# Patient Record
Sex: FEMALE | Race: WHITE | NOT HISPANIC OR LATINO | Employment: FULL TIME
[De-identification: names, ages, dates, MRNs, and addresses within clinical notes are randomized per-mention and may not be internally consistent; named-entity substitution may affect disease eponyms.]

---

## 2017-05-26 ENCOUNTER — HEALTH MAINTENANCE LETTER (OUTPATIENT)
Age: 42
End: 2017-05-26

## 2017-12-28 ENCOUNTER — OFFICE VISIT (OUTPATIENT)
Dept: FAMILY MEDICINE | Facility: CLINIC | Age: 42
End: 2017-12-28
Payer: COMMERCIAL

## 2017-12-28 VITALS
SYSTOLIC BLOOD PRESSURE: 130 MMHG | BODY MASS INDEX: 40.86 KG/M2 | HEIGHT: 68 IN | DIASTOLIC BLOOD PRESSURE: 84 MMHG | OXYGEN SATURATION: 96 % | RESPIRATION RATE: 16 BRPM | WEIGHT: 269.6 LBS | HEART RATE: 74 BPM | TEMPERATURE: 94.7 F

## 2017-12-28 DIAGNOSIS — I10 BENIGN ESSENTIAL HYPERTENSION: Primary | ICD-10-CM

## 2017-12-28 DIAGNOSIS — I87.2 VENOUS STASIS DERMATITIS OF LEFT LOWER EXTREMITY: ICD-10-CM

## 2017-12-28 RX ORDER — LOSARTAN POTASSIUM 25 MG/1
25 TABLET ORAL DAILY
Qty: 90 TABLET | Refills: 3 | Status: SHIPPED | OUTPATIENT
Start: 2017-12-28 | End: 2019-01-22

## 2017-12-28 RX ORDER — LOSARTAN POTASSIUM 25 MG/1
25 TABLET ORAL DAILY
COMMUNITY
End: 2017-12-28

## 2017-12-28 ASSESSMENT — ANXIETY QUESTIONNAIRES
IF YOU CHECKED OFF ANY PROBLEMS ON THIS QUESTIONNAIRE, HOW DIFFICULT HAVE THESE PROBLEMS MADE IT FOR YOU TO DO YOUR WORK, TAKE CARE OF THINGS AT HOME, OR GET ALONG WITH OTHER PEOPLE: NOT DIFFICULT AT ALL
7. FEELING AFRAID AS IF SOMETHING AWFUL MIGHT HAPPEN: NOT AT ALL
5. BEING SO RESTLESS THAT IT IS HARD TO SIT STILL: NOT AT ALL
GAD7 TOTAL SCORE: 0
2. NOT BEING ABLE TO STOP OR CONTROL WORRYING: NOT AT ALL
3. WORRYING TOO MUCH ABOUT DIFFERENT THINGS: NOT AT ALL
6. BECOMING EASILY ANNOYED OR IRRITABLE: NOT AT ALL
1. FEELING NERVOUS, ANXIOUS, OR ON EDGE: NOT AT ALL

## 2017-12-28 ASSESSMENT — PATIENT HEALTH QUESTIONNAIRE - PHQ9
5. POOR APPETITE OR OVEREATING: NOT AT ALL
SUM OF ALL RESPONSES TO PHQ QUESTIONS 1-9: 0

## 2017-12-28 ASSESSMENT — PAIN SCALES - GENERAL: PAINLEVEL: NO PAIN (0)

## 2017-12-28 NOTE — NURSING NOTE
"42 year old  Chief Complaint   Patient presents with     Refill Request     Losartan, 25 mg.        Blood pressure 130/84, pulse 74, temperature 94.7  F (34.8  C), temperature source Oral, resp. rate 16, height 5' 7.7\" (172 cm), weight 269 lb 9.6 oz (122.3 kg), last menstrual period 12/28/2017, SpO2 96 %, not currently breastfeeding. Body mass index is 41.36 kg/(m^2).  BP completed using cuff size: regular    There is no problem list on file for this patient.      Wt Readings from Last 2 Encounters:   12/28/17 269 lb 9.6 oz (122.3 kg)   02/05/01 221 lb (100.2 kg)     BP Readings from Last 3 Encounters:   12/28/17 130/84   06/27/02 120/74   02/05/01 110/74       Current Outpatient Prescriptions   Medication     losartan (COZAAR) 25 MG tablet     CETIRIZINE HCL PO     No current facility-administered medications for this visit.        Social History   Substance Use Topics     Smoking status: Never Smoker     Smokeless tobacco: Never Used     Alcohol use No       Health Maintenance Due   Topic Date Due     TETANUS IMMUNIZATION (SYSTEM ASSIGNED)  06/01/1991     PAP SCREENING Q3 YR (SYSTEM ASSIGNED)  06/01/1994     INFLUENZA VACCINE (SYSTEM ASSIGNED)  09/01/2017       No results found for: PAP    PHQ-2 ( 1999 Pfizer) 12/28/2017   Q1: Little interest or pleasure in doing things 0   Q2: Feeling down, depressed or hopeless 0   PHQ-2 Score 0       PHQ-9 SCORE 12/28/2017   Total Score 0       DANIELLE-7 SCORE 12/28/2017   Total Score 0       No flowsheet data found.    Tiki Bergman MA  December 28, 2017 8:14 AM  "

## 2017-12-28 NOTE — MR AVS SNAPSHOT
After Visit Summary   12/28/2017    Breanna Fischer    MRN: [id number]           Patient Information     Date Of Birth          1975        Visit Information        Provider Department      12/28/2017 8:00 AM Silvana Orellana NP [facility]        Today's Diagnoses     Benign essential hypertension    -  1    Venous stasis dermatitis of left lower extremity           Follow-ups after your visit        Additional Services     DERMATOLOGY REFERRAL       Your provider has referred you to: [facility]: Dermatology Clinic [facility] [phone]   [email or web address]    Please be aware that coverage of these services is subject to the terms and limitations of your health insurance plan.  Call member services at your health plan with any benefit or coverage questions.      Please bring the following with you to your appointment:    (1) Any X-Rays, CTs or MRIs which have been performed.  Contact the facility where they were done to arrange for  prior to your scheduled appointment.    (2) List of current medications  (3) This referral request   (4) Any documents/labs given to you for this referral                  Who to contact     Please call your clinic at [phone] to:    Ask questions about your health    Make or cancel appointments    Discuss your medicines    Learn about your test results    Speak to your doctor   If you have compliments or concerns about an experience at your clinic, or if you wish to file a complaint, please contact [facility] Patient Relations at [phone] or email us at [email or web address]         Additional Information About Your Visit        MyChart Information     TouchMail is an electronic gateway that provides easy, online access to your medical records. With TouchMail, you can request a clinic appointment, read your test results, renew a prescription or communicate with your care  "team.     To sign up for Pointt visit the website at [email or web address]   You will be asked to enter the access code listed below, as well as some personal information. Please follow the directions to create your username and password.     Your access code is: [email or web address]  Expires: 3/28/2018  8:50 AM     Your access code will  in 90 days. If you need help or a new code, please contact your [facility] or call [phone] for assistance.        Care EveryWhere ID     This is your Care EveryWhere ID. This could be used by other organizations to access your [place] medical records  [id number]        Your Vitals Were     Pulse Temperature Respirations Height Last Period Pulse Oximetry    74 94.7  F (34.8  C) (Oral) 16 5' 7.7\" (172 cm) 2017 (Exact Date) 96%    Breastfeeding? BMI (Body Mass Index)                No 41.36 kg/m2           Blood Pressure from Last 3 Encounters:   17 130/84   02 120/74   01 110/74    Weight from Last 3 Encounters:   17 269 lb 9.6 oz (122.3 kg)   01 221 lb (100.2 kg)              We Performed the Following     DERMATOLOGY REFERRAL          Where to get your medicines      These medications were sent to [facility] [address] [place] [facility] [address] [place] [address]  [address] [facility] [address]     Phone:  [phone]     losartan 25 MG tablet          Primary Care Provider Office Phone # Fax #    Lindsey Gricelda Sanchez, [facility] [phone] [address] [facility] [address]        Equal Access to Services     RITA GROVE AH: Hadii priscilla Candelario, waflorentinda luqadaha, qaybta kaalmada rachael, ester cabrera. So [facility] [phone].    ATENCIÓN: Si habla español, tiene a esposito disposición servicios gratuitos de asistencia lingüística. Llame al [phone].    We comply with applicable federal civil rights laws and [place] laws. We do not discriminate on the " basis of race, color, national origin, age, disability, sex, sexual orientation, or gender identity.            Thank you!     Thank you for choosing [facility]  for your care. Our goal is always to provide you with excellent care. Hearing back from our patients is one way we can continue to improve our services. Please take a few minutes to complete the written survey that you may receive in the mail after your visit with us. Thank you!             Your Updated Medication List - Protect others around you: Learn how to safely use, store and throw away your medicines at www.disposemymeds.org.          This list is accurate as of: 12/28/17  8:50 AM.  Always use your most recent med list.                   Brand Name Dispense Instructions for use Diagnosis    CETIRIZINE HCL PO      Take by mouth daily        losartan 25 MG tablet    COZAAR    90 tablet    Take 1 tablet (25 mg) by mouth daily    Benign essential hypertension

## 2017-12-28 NOTE — PROGRESS NOTES
Breanna Fischer is a 42 year old female who presents today to establish care and for a refill on her antihypertensive medication.  She has had HTN for several years, she relates it to situation and weight gain.  She has been well-controlled on her med, she has no symptoms of chest pain, SOB, fluid retention.  Denies problems with endurance.    Breanna has celiac disease, she manages with diet and does very well with that.  She has lost weight (50#) and has regained 44# of that and is concerned about that.  She needs to deal with this on her own, she feels confident that she knows what to do.    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  She has recently moved back to her home area from [place], she came because her family is here.  One sister has had her 3rd MI, she is in her late 30's.  They believe it is from radiation from lymphoma as a child.  She is  and has 2 older children at home with her.    Breanna has stasis dermatitis of her left lower leg, she has seen several dermatologists without much help.  She knows she may be reacting or allergic to something, she thinks it could be her cat.  She has used multiple products on it and has found that neosporin ungt OTC seems to help the most.    Review Of Systems  Skin: as above   ROS: 10 point ROS neg other than the symptoms noted above in the HPI.    Past Medical History:   Diagnosis Date     Celiac disease      Hypertension      Past Surgical History:   Procedure Laterality Date     CHOLECYSTECTOMY       Social History     Social History     Marital status:      Spouse name: N/A     Number of children: N/A     Years of education: N/A     Occupational History     Not on file.     Social History Main Topics     Smoking status: Never Smoker     Smokeless tobacco: Never Used     Alcohol use No     Drug use: No     Sexual activity: No     Other Topics Concern     Not on file     Social History Narrative     No narrative on file     Family History   Problem  "Relation Age of Onset     Obesity Mother      Thyroid Disease Mother      Thyroid Disease Father      Breast Cancer Paternal Grandmother      Obesity Sister      Thyroid Disease Sister        /84 (BP Location: Right arm, Patient Position: Chair, Cuff Size: Adult Large)  Pulse 74  Temp 94.7  F (34.8  C) (Oral)  Resp 16  Ht 5' 7.7\" (172 cm)  Wt 269 lb 9.6 oz (122.3 kg)  LMP 12/28/2017 (Exact Date)  SpO2 96%  Breastfeeding? No  BMI 41.36 kg/m2    Exam:  Constitutional: healthy, alert and no distress  Head: Normocephalic. No masses, lesions, tenderness or abnormalities  Neck: Neck supple. No adenopathy. Thyroid symmetric, normal size,, Carotids without bruits.  ENT: ENT exam normal, no neck nodes or sinus tenderness  Cardiovascular: negative, PMI normal. No lifts, heaves, or thrills. RRR. No murmurs, clicks gallops or rub  Respiratory: negative, Percussion normal. Good diaphragmatic excursion. Lungs clear  : Deferred  Musculoskeletal: extremities normal- no gross deformities noted, gait normal and normal muscle tone, trace pretibial edema  Skin: left lower leg, raised red waxy skin with pinpoint abrasions (from itching)   Psychiatric: mentation appears normal and affect normal/bright    Assessment/Plan:  1. Benign essential hypertension    - losartan (COZAAR) 25 MG tablet; Take 1 tablet (25 mg) by mouth daily  Dispense: 90 tablet; Refill: 3    2. Venous stasis dermatitis of left lower extremity    - DERMATOLOGY REFERRAL- she will call and make appointment with the PA at the derm clinic when she is ready    Discussed HME- she will RTC for full exam   "

## 2017-12-29 ASSESSMENT — ANXIETY QUESTIONNAIRES: GAD7 TOTAL SCORE: 0

## 2018-05-04 ENCOUNTER — TELEPHONE (OUTPATIENT)
Dept: FAMILY MEDICINE | Facility: CLINIC | Age: 43
End: 2018-05-04

## 2018-05-04 DIAGNOSIS — L29.9 PRURITIC DISORDER: Primary | ICD-10-CM

## 2018-05-04 RX ORDER — PREDNISONE 20 MG/1
60 TABLET ORAL DAILY
Qty: 21 TABLET | Refills: 0 | Status: SHIPPED | OUTPATIENT
Start: 2018-05-04 | End: 2018-05-11

## 2018-06-25 ENCOUNTER — TELEPHONE (OUTPATIENT)
Dept: DERMATOLOGY | Facility: CLINIC | Age: 43
End: 2018-06-25

## 2018-06-25 NOTE — TELEPHONE ENCOUNTER
Dermatology Pre-visit Call:    Left voicemail regarding appointment on 6/29/18 at 1115. Call back number provided for questions or rescheduling.

## 2018-06-29 ENCOUNTER — OFFICE VISIT (OUTPATIENT)
Dept: DERMATOLOGY | Facility: CLINIC | Age: 43
End: 2018-06-29
Payer: COMMERCIAL

## 2018-06-29 VITALS — HEART RATE: 85 BPM | DIASTOLIC BLOOD PRESSURE: 80 MMHG | SYSTOLIC BLOOD PRESSURE: 124 MMHG

## 2018-06-29 DIAGNOSIS — L23.89 ALLERGIC CONTACT DERMATITIS DUE TO OTHER AGENTS: Primary | ICD-10-CM

## 2018-06-29 RX ORDER — PHENOL 15 MG/ML
LIQUID TOPICAL PRN
Qty: 29.5 ML | Refills: 1 | Status: SHIPPED | OUTPATIENT
Start: 2018-06-29 | End: 2021-11-26

## 2018-06-29 RX ORDER — MOMETASONE FUROATE 1 MG/G
CREAM TOPICAL DAILY
Qty: 50 G | Refills: 3 | Status: SHIPPED | OUTPATIENT
Start: 2018-06-29 | End: 2021-11-26

## 2018-06-29 ASSESSMENT — PAIN SCALES - GENERAL: PAINLEVEL: NO PAIN (0)

## 2018-06-29 NOTE — PROGRESS NOTES
"[facility] Dermatology Note      Dermatology Problem List:    Specialty Problems     None          CC:   Derm Problem (Breanna is visiting to discuss \"terrible itching\" (10 years))        Encounter Date: Jun 29, 2018    History of Present Illness:  Ms. Breanna Fischer is a 43 year old female who presents as a referral from [place].    Since ten years left leg >> right leg eczematous lesions. First on feet and then moved from feet to lower limbs. Sometimes subacute dry and sometimes weeping lesions. No other body parts involved.    Locally paraffin type ointment A+D ointment with Paraffin and Lanolin; wash with water    Past Medical History:   There is no problem list on file for this patient.    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 She has never used smokeless tobacco. She reports that she does not drink alcohol or use illicit drugs.      Family History:  Family History   Problem Relation Age of Onset     Obesity Mother      Thyroid Disease Mother      Thyroid Disease Father      Breast Cancer Paternal Grandmother      Obesity Sister      Thyroid Disease Sister      Melanoma No family hx of      Skin Cancer No family hx of    dad had a lot of allergies (RC to cats, dogs, grass)  Sister has eczemas (atopic eczema and contact eczemas to fragrances and nummular eczema)    Medications:  Current Outpatient Prescriptions   Medication Sig Dispense Refill     CETIRIZINE " HCL PO Take by mouth daily       losartan (COZAAR) 25 MG tablet Take 1 tablet (25 mg) by mouth daily 90 tablet 3         Review of Systems:  -As per HPI  -Constitutional: The patient denies fatigue, fevers, chills, unintended weight loss, and night sweats.  -HEENT: Patient denies nonhealing oral sores.  -Skin: As above in HPI. No additional skin concerns.    Physical exam:  Vitals: /80  Pulse 85  GEN: This is a well developed, well-nourished female in no acute distress, in a pleasant mood.    SKIN: Focused examination of the legs, hands, face was performed.  On frank manuum left desquamation and pulpite seche of the fingers  Right lower leg Oedema and some eczematous lesions with scratch marks  Left lower leg important Oedema with weeping eczema and erythema and erosions    -No other lesions of concern on areas examined.     Impression/Plan:    1) stasis eczema with possible allergic contact dermatitis and atopic background    Plan patch tests with standard, emollients and preservatives, steroids and patients own A&D ointment    Castellani paint or Gentian violet paint before using the Mometasone cream on the leg.    Maybe try later compression bandage --> get first acute eczema under control    Use pure paraffin (Vaseline) for later phase if dry    Use Vanicream soap    Follow-up next week      Order for PATCH TESTS      [] Inpatient / Arana....   Bed ....  [x] Outpatient    Skin Atopy           :       [x] Yes   [] No  Rhinitis/Sinusitis :       [x] Yes   [] No  Allergic Asthma   :       [] Yes   [x] No  Leg ulcers            :       [] Yes   [x] No  Hand eczema       :       [x] Yes   [] No    Leading hand :  [x] R [] L               [] Ambidextrous                      Reason for tests (Suspected allergy): stasis eczema with possible contact allergy on atopic dermatitis background  Known previous allergies: fragrances?    [] Standardized panels  [x] Standard panel (40 tests)  [x] Preservatives &  Antimicrobials (31 tests)  [x] Emulsifiers & Additives (25 tests)   [] Perfumes/Flavours & Plants (25 tests)  [] Hairdresser panel (12 tests)  [] Rubber Chemicals (22 tests)  [] Plastics (26 tests)  [] Colorants/Dyes/Food additives (20 tests)  [] Metals (implants/dental) (23 tests)  [] Local anaesthetics/NSAIDs (12 tests)  [] Antibiotics & Antimycotics (14 tests)   [x] Corticosteroids (15 tests)   [] Photopatch test (32 tests)   [] others: ...    [x] Patient's own products: A&D oint    DO NOT test if chemical or biological identity is unknown!     always ask from patient the product information and safety sheets (MSDS)     [] Patient needs consultation with Allergy team  [x] Tests discussed with Allergy team (can have direct appointment for patch tests)

## 2018-06-29 NOTE — LETTER
"6/29/2018       RE: Breanna Fischer  [address] [place] [address]     Dear Colleague,    Thank you for referring your patient, Breanna Fischer, to the [facility] DERMATOLOGY at [facility]. Please see a copy of my visit note below.    [facility] Dermatology Note      Dermatology Problem List:    Specialty Problems     None          CC:   Derm Problem (Breanna is visiting to discuss \"terrible itching\" (10 years))        Encounter Date: Jun 29, 2018    History of Present Illness:  Ms. Breanna Fischer is a 43 year old female who presents as a referral from [place].    Since ten years left leg >> right leg eczematous lesions. First on feet and then moved from feet to lower limbs. Sometimes subacute dry and sometimes weeping lesions. No other body parts involved.    Locally paraffin type ointment A+D ointment with Paraffin and Lanolin; wash with water    Past Medical History:   There is no problem list on file for this patient.    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 She has never used smokeless tobacco. She reports that she does not drink alcohol or use illicit drugs.      Family History:  Family History   Problem Relation Age of Onset     Obesity Mother      Thyroid Disease Mother      Thyroid Disease Father      Breast Cancer Paternal Grandmother      Obesity Sister      Thyroid Disease Sister      Melanoma No family hx of      Skin " Cancer No family hx of    dad had a lot of allergies (RC to cats, dogs, grass)  Sister has eczemas (atopic eczema and contact eczemas to fragrances and nummular eczema)    Medications:  Current Outpatient Prescriptions   Medication Sig Dispense Refill     CETIRIZINE HCL PO Take by mouth daily       losartan (COZAAR) 25 MG tablet Take 1 tablet (25 mg) by mouth daily 90 tablet 3         Review of Systems:  -As per HPI  -Constitutional: The patient denies fatigue, fevers, chills, unintended weight loss, and night sweats.  -HEENT: Patient denies nonhealing oral sores.  -Skin: As above in HPI. No additional skin concerns.    Physical exam:  Vitals: /80  Pulse 85  GEN: This is a well developed, well-nourished female in no acute distress, in a pleasant mood.    SKIN: Focused examination of the legs, hands, face was performed.  On frank manuum left desquamation and pulpite seche of the fingers  Right lower leg Oedema and some eczematous lesions with scratch marks  Left lower leg important Oedema with weeping eczema and erythema and erosions    -No other lesions of concern on areas examined.     Impression/Plan:    1) stasis eczema with possible allergic contact dermatitis and atopic background    Plan patch tests with standard, emollients and preservatives, steroids and patients own A&D ointment    Castellani paint or Gentian violet paint before using the Mometasone cream on the leg.    Maybe try later compression bandage --> get first acute eczema under control    Use pure paraffin (Vaseline) for later phase if dry    Use Vanicream soap    Follow-up next week      Order for PATCH TESTS      [] Inpatient / Arana....   Bed ....  [x] Outpatient    Skin Atopy           :       [x] Yes   [] No  Rhinitis/Sinusitis :       [x] Yes   [] No  Allergic Asthma   :       [] Yes   [x] No  Leg ulcers            :       [] Yes   [x] No  Hand eczema       :       [x] Yes   [] No    Leading hand :  [x] R [] L               []  Ambidextrous                      Reason for tests (Suspected allergy): stasis eczema with possible contact allergy on atopic dermatitis background  Known previous allergies: fragrances?    [] Standardized panels  [x] Standard panel (40 tests)  [x] Preservatives & Antimicrobials (31 tests)  [x] Emulsifiers & Additives (25 tests)   [] Perfumes/Flavours & Plants (25 tests)  [] Hairdresser panel (12 tests)  [] Rubber Chemicals (22 tests)  [] Plastics (26 tests)  [] Colorants/Dyes/Food additives (20 tests)  [] Metals (implants/dental) (23 tests)  [] Local anaesthetics/NSAIDs (12 tests)  [] Antibiotics & Antimycotics (14 tests)   [x] Corticosteroids (15 tests)   [] Photopatch test (32 tests)   [] others: ...    [x] Patient's own products: A&D oint    DO NOT test if chemical or biological identity is unknown!     always ask from patient the product information and safety sheets (MSDS)     [] Patient needs consultation with Allergy team  [x] Tests discussed with Allergy team (can have direct appointment for patch tests)    Again, thank you for allowing me to participate in the care of your patient.      Sincerely,    Luis Alberto Myers MD

## 2018-06-29 NOTE — MR AVS SNAPSHOT
After Visit Summary   6/29/2018    Breanna Fischer    MRN: [id number]           Patient Information     Date Of Birth          1975        Visit Information        Provider Department      6/29/2018 11:15 AM Luis Alberto Myers MD [facility] Dermatology        Today's Diagnoses     Allergic contact dermatitis due to other agents    -  1      Care Instructions     Dr Myers    [phone] to set up appointment ask for Yoon   Impression/Plan:     1) stasis eczema with possible allergic contact dermatitis and atopic background    Plan patch tests with standard, emollients and preservatives, steroids and patients own A&D ointment    Castellani paint or Gentian violet paint before using the Mometasone cream on the leg.    Maybe try later compression bandage --> get first acute eczema under control    Use pure paraffin (Vaseline) for later phase if dry    Use Vanicream soap     Follow-up next week          Follow-ups after your visit        Who to contact     Please call your clinic at [phone] to:    Ask questions about your health    Make or cancel appointments    Discuss your medicines    Learn about your test results    Speak to your doctor            Additional Information About Your Visit        Care EveryWhere ID     This is your Care EveryWhere ID. This could be used by other organizations to access your [facility] medical records  [id number]        Your Vitals Were     Pulse                   85            Blood Pressure from Last 3 Encounters:   06/29/18 124/80   12/28/17 130/84   06/27/02 120/74    Weight from Last 3 Encounters:   12/28/17 122.3 kg (269 lb 9.6 oz)   02/05/01 100.2 kg (221 lb)              Today, you had the following     No orders found for display         Today's Medication Changes          These changes are accurate as of 6/29/18 12:48 PM.  If you have any questions, ask your nurse or doctor.               Start taking these medicines.        Dose/Directions    Castellani  Paint 1.5 % Liqd   Used for:  Allergic contact dermatitis due to other agents   Started by:  Luis Alberto Myers MD        Externally apply topically as needed Paint every evening on the excematous lesions on the legs before adding the topical steroid. As alternative pharmacy could provide Gentian violet   Quantity:  29.5 mL   Refills:  1       mometasone 0.1 % cream   Commonly known as:  ELOCON   Used for:  Allergic contact dermatitis due to other agents   Started by:  Luis Alberto Myers MD        Apply topically daily   Quantity:  50 g   Refills:  3            Where to get your medicines      These medications were sent to [facility] [address] [place] [facility] [address] [facility]  [address] [facility] [address]     Phone:  [phone]     mometasone 0.1 % cream         Some of these will need a paper prescription and others can be bought over the counter.  Ask your nurse if you have questions.     Bring a paper prescription for each of these medications     Castellani Paint 1.5 % Liqd                Primary Care Provider Office Phone # Fax #    Lindsey Madison Daniel, APRN [facility] [phone] [address] [facility] [address]        Equal Access to Services     RITA GROVE AH: Hadii priscilla wilson hadasho Soomaali, waaxda luqadaha, qaybta kaalmada adeegyada, ester cabrera. So [facility] [phone].    ATENCIÓN: Si habla español, tiene a esposito disposición servicios gratuitos de asistencia lingüística. Llame al [phone].    We comply with applicable federal civil rights laws and [place] laws. We do not discriminate on the basis of race, color, national origin, age, disability, sex, sexual orientation, or gender identity.            Thank you!     Thank you for choosing [facility] DERMATOLOGY  for your care. Our goal is always to provide you with excellent care. Hearing back from our patients is one way we can continue to improve our services. Please take a few minutes to complete  the written survey that you may receive in the mail after your visit with us. Thank you!             Your Updated Medication List - Protect others around you: Learn how to safely use, store and throw away your medicines at www.disposemymeds.org.          This list is accurate as of 6/29/18 12:48 PM.  Always use your most recent med list.                   Brand Name Dispense Instructions for use Diagnosis    Castellani Paint 1.5 % Liqd     29.5 mL    Externally apply topically as needed Paint every evening on the excematous lesions on the legs before adding the topical steroid. As alternative pharmacy could provide Gentian violet    Allergic contact dermatitis due to other agents       CETIRIZINE HCL PO      Take by mouth daily        losartan 25 MG tablet    COZAAR    90 tablet    Take 1 tablet (25 mg) by mouth daily    Benign essential hypertension       mometasone 0.1 % cream    ELOCON    50 g    Apply topically daily    Allergic contact dermatitis due to other agents

## 2018-06-29 NOTE — NURSING NOTE
"Dermatology Rooming Note    Breanna Fischer's goals for this visit include:   Chief Complaint   Patient presents with     Derm Problem     Breanna is visiting to discuss \"terrible itching\" (10 years)     Talita Phipps LPN    "

## 2018-07-30 ENCOUNTER — OFFICE VISIT (OUTPATIENT)
Dept: DERMATOLOGY | Facility: CLINIC | Age: 43
End: 2018-07-30
Payer: COMMERCIAL

## 2018-07-30 DIAGNOSIS — L23.89 ALLERGIC CONTACT DERMATITIS DUE TO OTHER AGENTS: Primary | ICD-10-CM

## 2018-07-30 ASSESSMENT — PAIN SCALES - GENERAL: PAINLEVEL: NO PAIN (0)

## 2018-07-30 NOTE — NURSING NOTE
Chief Complaint   Patient presents with     Derm Problem     Patch testing day 1       Nichol Scanlon, EMT

## 2018-07-30 NOTE — MR AVS SNAPSHOT
After Visit Summary   7/30/2018    Breanna Fischer    MRN: [id number]           Patient Information     Date Of Birth          1975        Visit Information        Provider Department      7/30/2018 4:45 PM Luis Alberto Myers MD [facility] Dermatology        Today's Diagnoses     Allergic contact dermatitis due to other agents    -  1      Care Instructions    Nurse for  is Yoon          Follow-ups after your visit        Your next 10 appointments already scheduled     Aug 01, 2018  6:30 PM CDT   (Arrive by 6:15 PM)   Return Allergy with Luis Alberto Myers MD   [facility] Dermatology ([facility])    [address] [address] [phone]            Aug 03, 2018  7:45 AM CDT   (Arrive by 7:30 AM)   Return Allergy with Luis Alberto Myers MD   [facility] Dermatology ([facility])    [address] [address] [phone]              Who to contact     Please call your clinic at [phone] to:    Ask questions about your health    Make or cancel appointments    Discuss your medicines    Learn about your test results    Speak to your doctor            Additional Information About Your Visit        Care EveryWhere ID     This is your Care EveryWhere ID. This could be used by other organizations to access your [facility] medical records  [id number]         Blood Pressure from Last 3 Encounters:   06/29/18 124/80   12/28/17 130/84   06/27/02 120/74    Weight from Last 3 Encounters:   12/28/17 122.3 kg (269 lb 9.6 oz)   02/05/01 100.2 kg (221 lb)              We Performed the Following     Compression stockings     PATCH TESTS, EACH     PATCH TESTS, EACH        Primary Care Provider Office Phone # Fax #    EVIN Dickinson [facility] [phone] [address] [facility] [address]        Equal Access to Services     RITA GROVE : Eleni wong  [place]ak, aramisda luphiladaha, qachetta kaalysia cano, ester mckeondoc rick. So [facility] [phone].    ATENCIÓN: Si phyllis rehman, tiene a esposito disposición servicios gratuitos de asistencia lingüística. Darrius al [phone].    We comply with applicable federal civil rights laws and [place] laws. We do not discriminate on the basis of race, color, national origin, age, disability, sex, sexual orientation, or gender identity.            Thank you!     Thank you for choosing [facility] DERMATOLOGY  for your care. Our goal is always to provide you with excellent care. Hearing back from our patients is one way we can continue to improve our services. Please take a few minutes to complete the written survey that you may receive in the mail after your visit with us. Thank you!             Your Updated Medication List - Protect others around you: Learn how to safely use, store and throw away your medicines at www.disposemymeds.org.          This list is accurate as of 7/30/18  6:50 PM.  Always use your most recent med list.                   Brand Name Dispense Instructions for use Diagnosis    Castellani Paint 1.5 % Liqd     29.5 mL    Externally apply topically as needed Paint every evening on the excematous lesions on the legs before adding the topical steroid. As alternative pharmacy could provide Gentian violet    Allergic contact dermatitis due to other agents       CETIRIZINE HCL PO      Take by mouth daily        losartan 25 MG tablet    COZAAR    90 tablet    Take 1 tablet (25 mg) by mouth daily    Benign essential hypertension       mometasone 0.1 % cream    ELOCON    50 g    Apply topically daily    Allergic contact dermatitis due to other agents

## 2018-07-30 NOTE — PROGRESS NOTES
[facility] Dermatology Note      Dermatology Problem List:    Specialty Problems     None          CC:   Derm Problem (Patch testing day 1)        Encounter Date: Jul 30, 2018    History of Present Illness:  Ms. Breanna Fischer is a 43 year old female who presents as a referral from [place].    Since ten years left leg >> right leg eczematous lesions. First on feet and then moved from feet to lower limbs. Sometimes subacute dry and sometimes weeping lesions. No other body parts involved.    Locally paraffin type ointment A+D ointment with Paraffin and Lanolin; wash with water    Past Medical History:   There is no problem list on file for this patient.    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 She has never used smokeless tobacco. She reports that she does not drink alcohol or use illicit drugs.      Family History:  Family History   Problem Relation Age of Onset     Obesity Mother      Thyroid Disease Mother      Thyroid Disease Father      Breast Cancer Paternal Grandmother      Obesity Sister      Thyroid Disease Sister      Melanoma No family hx of      Skin Cancer No family hx of    dad had a lot of allergies (RC to cats, dogs, grass)  Sister has eczemas (atopic eczema and contact eczemas to fragrances and nummular eczema)    Medications:  Current Outpatient Prescriptions   Medication Sig Dispense Refill     Castellani Paint 1.5 % LIQD Externally apply  topically as needed Paint every evening on the excematous lesions on the legs before adding the topical steroid. As alternative pharmacy could provide Gentian violet 29.5 mL 1     CETIRIZINE HCL PO Take by mouth daily       losartan (COZAAR) 25 MG tablet Take 1 tablet (25 mg) by mouth daily 90 tablet 3     mometasone (ELOCON) 0.1 % cream Apply topically daily 50 g 3         Review of Systems:  -As per HPI  -Constitutional: The patient denies fatigue, fevers, chills, unintended weight loss, and night sweats.  -HEENT: Patient denies nonhealing oral sores.  -Skin: As above in HPI. No additional skin concerns.    Physical exam:  Vitals: There were no vitals taken for this visit.  GEN: This is a well developed, well-nourished female in no acute distress, in a pleasant mood.    SKIN: Focused examination of the legs, hands, face was performed.  Eczematous lesions on fingers and particularly on the legs much improved on Gentian violet and topical steroids. However, still Oedemas on the legs    -No other lesions of concern on areas examined.     Impression/Plan:    1) stasis eczema with possible allergic contact dermatitis and atopic background    Continue for a few days Gentian violet paint before using the Mometasone cream on the leg.    Prescription of compression stockings (measure in the morning)    Use pure paraffin (Vaseline) for later phase if dry    Use Vanicream soap    Follow-up next week      Order for PATCH TESTS      [] Inpatient / Arana....   Bed ....  [x] Outpatient    Skin Atopy           :       [x] Yes   [] No  Rhinitis/Sinusitis :       [x] Yes   [] No  Allergic Asthma   :       [] Yes   [x] No  Leg ulcers            :       [] Yes   [x] No  Hand eczema       :       [x] Yes   [] No    Leading hand :  [x] R [] L               [] Ambidextrous                      Reason for tests (Suspected allergy): stasis eczema with possible contact allergy on atopic dermatitis background  Known previous allergies:  fragrances?    [] Standardized panels  [x] Standard panel (40 tests)  [x] Preservatives & Antimicrobials (31 tests)  [x] Emulsifiers & Additives (25 tests)   [] Perfumes/Flavours & Plants (25 tests)  [] Hairdresser panel (12 tests)  [] Rubber Chemicals (22 tests)  [] Plastics (26 tests)  [] Colorants/Dyes/Food additives (20 tests)  [] Metals (implants/dental) (23 tests)  [] Local anaesthetics/NSAIDs (12 tests)  [] Antibiotics & Antimycotics (14 tests)   [x] Corticosteroids (15 tests)   [] Photopatch test (32 tests)   [] others: ...    [x] Patient's own products: A&D oint    DO NOT test if chemical or biological identity is unknown!     always ask from patient the product information and safety sheets (MSDS)     [] Patient needs consultation with Allergy team  [x] Tests discussed with Allergy team (can have direct appointment for patch tests)    ==> put patch tests on back as prescribed on 07/30/2018 around 6pm (back without lesions)  - 1st readings 08/01/2018  - 2nd readings 08/03/2018    RESULTS & EVALUATION of PATCH TESTS      Patch test readings after     [] 2 days, [] 3 days [] 4 days, [] 5 days,   Other duration: ...  [] No relevant allergic reaction observed    [] Allergic reaction diagnosed against: ......      Interpretation/ remarks:   ...    [] Patient information given   [] ACSD information   [] SmartPractice information    [] Treatment prescribed: ...

## 2018-07-30 NOTE — LETTER
7/30/2018       RE: Breanna Fischer  [address] [place] [address]     Dear Colleague,    Thank you for referring your patient, Breanna Fischer, to the [facility] DERMATOLOGY at [facility]. Please see a copy of my visit note below.    [facility] Dermatology Note      Dermatology Problem List:    Specialty Problems     None        CC:   Derm Problem (Patch testing day 1)    Encounter Date: Jul 30, 2018    History of Present Illness:  Ms. Breanna Fischer is a 43 year old female who presents as a referral from [place].    Since ten years left leg >> right leg eczematous lesions. First on feet and then moved from feet to lower limbs. Sometimes subacute dry and sometimes weeping lesions. No other body parts involved.    Locally paraffin type ointment A+D ointment with Paraffin and Lanolin; wash with water    Past Medical History:   There is no problem list on file for this patient.    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 She has never used smokeless tobacco. She reports that she does not drink alcohol or use illicit drugs.      Family History:  Family History   Problem Relation Age of Onset     Obesity Mother      Thyroid Disease Mother      Thyroid Disease Father      Breast Cancer Paternal Grandmother      Obesity Sister      Thyroid Disease Sister      Melanoma No family hx of      Skin Cancer No family hx of    dad had a lot of  allergies (RC to cats, dogs, grass)  Sister has eczemas (atopic eczema and contact eczemas to fragrances and nummular eczema)    Medications:  Current Outpatient Prescriptions   Medication Sig Dispense Refill     Castellani Paint 1.5 % LIQD Externally apply topically as needed Paint every evening on the excematous lesions on the legs before adding the topical steroid. As alternative pharmacy could provide Gentian violet 29.5 mL 1     CETIRIZINE HCL PO Take by mouth daily       losartan (COZAAR) 25 MG tablet Take 1 tablet (25 mg) by mouth daily 90 tablet 3     mometasone (ELOCON) 0.1 % cream Apply topically daily 50 g 3         Review of Systems:  -As per HPI  -Constitutional: The patient denies fatigue, fevers, chills, unintended weight loss, and night sweats.  -HEENT: Patient denies nonhealing oral sores.  -Skin: As above in HPI. No additional skin concerns.    Physical exam:  Vitals: There were no vitals taken for this visit.  GEN: This is a well developed, well-nourished female in no acute distress, in a pleasant mood.    SKIN: Focused examination of the legs, hands, face was performed.  Eczematous lesions on fingers and particularly on the legs much improved on Gentian violet and topical steroids. However, still Oedemas on the legs    -No other lesions of concern on areas examined.     Impression/Plan:    1) stasis eczema with possible allergic contact dermatitis and atopic background    Continue for a few days Gentian violet paint before using the Mometasone cream on the leg.    Prescription of compression stockings (measure in the morning)    Use pure paraffin (Vaseline) for later phase if dry    Use Vanicream soap    Follow-up next week      Order for PATCH TESTS      [] Inpatient / Arana....   Bed ....  [x] Outpatient    Skin Atopy           :       [x] Yes   [] No  Rhinitis/Sinusitis :       [x] Yes   [] No  Allergic Asthma   :       [] Yes   [x] No  Leg ulcers            :       [] Yes   [x] No  Hand eczema        :       [x] Yes   [] No    Leading hand :  [x] R [] L               [] Ambidextrous                      Reason for tests (Suspected allergy): stasis eczema with possible contact allergy on atopic dermatitis background  Known previous allergies: fragrances?    [] Standardized panels  [x] Standard panel (40 tests)  [x] Preservatives & Antimicrobials (31 tests)  [x] Emulsifiers & Additives (25 tests)   [] Perfumes/Flavours & Plants (25 tests)  [] Hairdresser panel (12 tests)  [] Rubber Chemicals (22 tests)  [] Plastics (26 tests)  [] Colorants/Dyes/Food additives (20 tests)  [] Metals (implants/dental) (23 tests)  [] Local anaesthetics/NSAIDs (12 tests)  [] Antibiotics & Antimycotics (14 tests)   [x] Corticosteroids (15 tests)   [] Photopatch test (32 tests)   [] others: ...    [x] Patient's own products: A&D oint    DO NOT test if chemical or biological identity is unknown!     always ask from patient the product information and safety sheets (MSDS)     [] Patient needs consultation with Allergy team  [x] Tests discussed with Allergy team (can have direct appointment for patch tests)    ==> put patch tests on back as prescribed on 07/30/2018 around 6pm (back without lesions)  - 1st readings 08/01/2018  - 2nd readings 08/03/2018    RESULTS & EVALUATION of PATCH TESTS      Patch test readings after     [] 2 days, [] 3 days [] 4 days, [] 5 days,   Other duration: ...  [] No relevant allergic reaction observed    [] Allergic reaction diagnosed against: ......      Interpretation/ remarks:   ...    [] Patient information given   [] ACSD information   [] SmartPractice information    [] Treatment prescribed: ...        Again, thank you for allowing me to participate in the care of your patient.      Sincerely,    Luis Alberto Myers MD

## 2018-07-31 NOTE — NURSING NOTE
Patient had 112 patch tests placed today.    - Standard Series- 40 tests  Preservative and antimicrobials - 30 tests ( missing Chloracetamide)  Emulsifiers and additives - 25 tests  -corticosteroids- 15 tests  -Personal products - 2 tests (A&D ointment, Aquaphor Advanced therapy)

## 2018-08-01 ENCOUNTER — OFFICE VISIT (OUTPATIENT)
Dept: DERMATOLOGY | Facility: CLINIC | Age: 43
End: 2018-08-01
Payer: COMMERCIAL

## 2018-08-01 DIAGNOSIS — L23.89 ALLERGIC CONTACT DERMATITIS DUE TO OTHER AGENTS: Primary | ICD-10-CM

## 2018-08-01 ASSESSMENT — PAIN SCALES - GENERAL: PAINLEVEL: MILD PAIN (2)

## 2018-08-01 NOTE — LETTER
8/1/2018       RE: Breanna Fischer  [address] [place] [address]     Dear Colleague,    Thank you for referring your patient, Breanna Fischer, to the [facility] DERMATOLOGY at [facility]. Please see a copy of my visit note below.    [facility] Dermatology Note      Dermatology Problem List:    Specialty Problems     None          CC:   Derm Problem (Patch testing day 3)        Encounter Date: Aug 1, 2018    History of Present Illness:  Ms. Breanna Fischer is a 43 year old female who presents as a referral from [place].    Since ten years left leg >> right leg eczematous lesions. First on feet and then moved from feet to lower limbs. Sometimes subacute dry and sometimes weeping lesions. No other body parts involved.    Locally paraffin type ointment A+D ointment with Paraffin and Lanolin; wash with water    Past Medical History:   There is no problem list on file for this patient.    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 She has never used smokeless tobacco. She reports that she does not drink alcohol or use illicit drugs.      Family History:  Family History   Problem Relation Age of Onset     Obesity Mother      Thyroid Disease Mother      Thyroid Disease Father      Breast Cancer Paternal Grandmother      Obesity Sister      Thyroid Disease Sister      Melanoma No family hx of      Skin Cancer No family hx of    dad had a lot  of allergies (RC to cats, dogs, grass)  Sister has eczemas (atopic eczema and contact eczemas to fragrances and nummular eczema)    Medications:  Current Outpatient Prescriptions   Medication Sig Dispense Refill     Castellani Paint 1.5 % LIQD Externally apply topically as needed Paint every evening on the excematous lesions on the legs before adding the topical steroid. As alternative pharmacy could provide Gentian violet 29.5 mL 1     CETIRIZINE HCL PO Take by mouth daily       losartan (COZAAR) 25 MG tablet Take 1 tablet (25 mg) by mouth daily 90 tablet 3     mometasone (ELOCON) 0.1 % cream Apply topically daily 50 g 3         Review of Systems:  -As per HPI  -Constitutional: The patient denies fatigue, fevers, chills, unintended weight loss, and night sweats.  -HEENT: Patient denies nonhealing oral sores.  -Skin: As above in HPI. No additional skin concerns.    Physical exam:  Vitals: There were no vitals taken for this visit.  GEN: This is a well developed, well-nourished female in no acute distress, in a pleasant mood.    SKIN: Focused examination of the legs, hands, face was performed.  Eczematous lesions on fingers and particularly on the legs much improved on Gentian violet and topical steroids. However, still Oedemas on the legs    -No other lesions of concern on areas examined.     Impression/Plan:    1) stasis eczema with possible allergic contact dermatitis and atopic background    Continue for a few days Gentian violet paint before using the Mometasone cream on the leg.    Prescription of compression stockings (measure in the morning)    Use pure paraffin (Vaseline) for later phase if dry    Use Vanicream soap    Follow-up next week      Order for PATCH TESTS      [] Inpatient / Arana....   Bed ....  [x] Outpatient    Skin Atopy           :       [x] Yes   [] No  Rhinitis/Sinusitis :       [x] Yes   [] No  Allergic Asthma   :       [] Yes   [x] No  Leg ulcers            :       [] Yes   [x] No  Hand  eczema       :       [x] Yes   [] No    Leading hand :  [x] R [] L               [] Ambidextrous                      Reason for tests (Suspected allergy): stasis eczema with possible contact allergy on atopic dermatitis background  Known previous allergies: fragrances?    [] Standardized panels  [x] Standard panel (40 tests)  [x] Preservatives & Antimicrobials (31 tests)  [x] Emulsifiers & Additives (25 tests)   [] Perfumes/Flavours & Plants (25 tests)  [] Hairdresser panel (12 tests)  [] Rubber Chemicals (22 tests)  [] Plastics (26 tests)  [] Colorants/Dyes/Food additives (20 tests)  [] Metals (implants/dental) (23 tests)  [] Local anaesthetics/NSAIDs (12 tests)  [] Antibiotics & Antimycotics (14 tests)   [x] Corticosteroids (15 tests)   [] Photopatch test (32 tests)   [] others: ...    [x] Patient's own products: A&D oint    DO NOT test if chemical or biological identity is unknown!     always ask from patient the product information and safety sheets (MSDS)     [] Patient needs consultation with Allergy team  [x] Tests discussed with Allergy team (can have direct appointment for patch tests)    ==> put patch tests on back as prescribed on 07/30/2018 around 6pm (back without lesions)  - 1st readings 08/01/2018: patch tests were well in-situ; several reactions: ++ Nickel, ++ Fragrance I, + Fragrance II, + Compositae mix, ++ Bacitracine, + Propolis; redness form Bacitracine and Fragrances/Compositae spills over to Formaldehyde, Kathon CG and [place].  - 2nd readings 08/03/2018    RESULTS & EVALUATION of PATCH TESTS      Patch test readings after     [x] 2 days, [] 3 days [] 4 days, [] 5 days,   Other duration: ...    [] Allergic reaction diagnosed against: ++ Nickel, ++ Fragrance I, + Fragrance II, + Compositae mix, ++ Bacitracine, + Propolis;      Interpretation/ remarks:   ...    [x] Patient information given   [] ACSD information   [x] SmartPractice information given for: ++ Nickel, ++ Fragrance I, + Fragrance  II, + Compositae mix, ++ Bacitracine, + Propolis;  [] Treatment prescribed: ...        Again, thank you for allowing me to participate in the care of your patient.      Sincerely,    Luis Alberto Myers MD

## 2018-08-01 NOTE — NURSING NOTE
Dermatology Rooming Note    Breanna Fischer's goals for this visit include:   Chief Complaint   Patient presents with     Derm Problem     Patch testing day 3       Nichol Scanlon, EMT

## 2018-08-01 NOTE — PROGRESS NOTES
[facility] Dermatology Note      Dermatology Problem List:    Specialty Problems     None          CC:   Derm Problem (Patch testing day 3)        Encounter Date: Aug 1, 2018    History of Present Illness:  Ms. Breanna Fischer is a 43 year old female who presents as a referral from [place].    Since ten years left leg >> right leg eczematous lesions. First on feet and then moved from feet to lower limbs. Sometimes subacute dry and sometimes weeping lesions. No other body parts involved.    Locally paraffin type ointment A+D ointment with Paraffin and Lanolin; wash with water    Past Medical History:   There is no problem list on file for this patient.    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 She has never used smokeless tobacco. She reports that she does not drink alcohol or use illicit drugs.      Family History:  Family History   Problem Relation Age of Onset     Obesity Mother      Thyroid Disease Mother      Thyroid Disease Father      Breast Cancer Paternal Grandmother      Obesity Sister      Thyroid Disease Sister      Melanoma No family hx of      Skin Cancer No family hx of    dad had a lot of allergies (RC to cats, dogs, grass)  Sister has eczemas (atopic eczema and contact eczemas to fragrances and nummular eczema)    Medications:  Current Outpatient Prescriptions   Medication Sig Dispense Refill     Castellani Paint 1.5 % LIQD Externally apply  topically as needed Paint every evening on the excematous lesions on the legs before adding the topical steroid. As alternative pharmacy could provide Gentian violet 29.5 mL 1     CETIRIZINE HCL PO Take by mouth daily       losartan (COZAAR) 25 MG tablet Take 1 tablet (25 mg) by mouth daily 90 tablet 3     mometasone (ELOCON) 0.1 % cream Apply topically daily 50 g 3         Review of Systems:  -As per HPI  -Constitutional: The patient denies fatigue, fevers, chills, unintended weight loss, and night sweats.  -HEENT: Patient denies nonhealing oral sores.  -Skin: As above in HPI. No additional skin concerns.    Physical exam:  Vitals: There were no vitals taken for this visit.  GEN: This is a well developed, well-nourished female in no acute distress, in a pleasant mood.    SKIN: Focused examination of the legs, hands, face was performed.  Eczematous lesions on fingers and particularly on the legs much improved on Gentian violet and topical steroids. However, still Oedemas on the legs    -No other lesions of concern on areas examined.     Impression/Plan:    1) stasis eczema with possible allergic contact dermatitis and atopic background    Continue for a few days Gentian violet paint before using the Mometasone cream on the leg.    Prescription of compression stockings (measure in the morning)    Use pure paraffin (Vaseline) for later phase if dry    Use Vanicream soap    Follow-up next week      Order for PATCH TESTS      [] Inpatient / Arana....   Bed ....  [x] Outpatient    Skin Atopy           :       [x] Yes   [] No  Rhinitis/Sinusitis :       [x] Yes   [] No  Allergic Asthma   :       [] Yes   [x] No  Leg ulcers            :       [] Yes   [x] No  Hand eczema       :       [x] Yes   [] No    Leading hand :  [x] R [] L               [] Ambidextrous                      Reason for tests (Suspected allergy): stasis eczema with possible contact allergy on atopic dermatitis background  Known previous allergies:  fragrances?    [] Standardized panels  [x] Standard panel (40 tests)  [x] Preservatives & Antimicrobials (31 tests)  [x] Emulsifiers & Additives (25 tests)   [] Perfumes/Flavours & Plants (25 tests)  [] Hairdresser panel (12 tests)  [] Rubber Chemicals (22 tests)  [] Plastics (26 tests)  [] Colorants/Dyes/Food additives (20 tests)  [] Metals (implants/dental) (23 tests)  [] Local anaesthetics/NSAIDs (12 tests)  [] Antibiotics & Antimycotics (14 tests)   [x] Corticosteroids (15 tests)   [] Photopatch test (32 tests)   [] others: ...    [x] Patient's own products: A&D oint    DO NOT test if chemical or biological identity is unknown!     always ask from patient the product information and safety sheets (MSDS)     [] Patient needs consultation with Allergy team  [x] Tests discussed with Allergy team (can have direct appointment for patch tests)    ==> put patch tests on back as prescribed on 07/30/2018 around 6pm (back without lesions)  - 1st readings 08/01/2018: patch tests were well in-situ; several reactions: ++ Nickel, ++ Fragrance I, + Fragrance II, + Compositae mix, ++ Bacitracine, + Propolis; redness form Bacitracine and Fragrances/Compositae spills over to Formaldehyde, Kathon CG and [place].  - 2nd readings 08/03/2018    RESULTS & EVALUATION of PATCH TESTS      Patch test readings after     [x] 2 days, [] 3 days [] 4 days, [] 5 days,   Other duration: ...    [] Allergic reaction diagnosed against: ++ Nickel, ++ Fragrance I, + Fragrance II, + Compositae mix, ++ Bacitracine, + Propolis;      Interpretation/ remarks:   ...    [x] Patient information given   [] ACSD information   [x] SmartPractice information given for: ++ Nickel, ++ Fragrance I, + Fragrance II, + Compositae mix, ++ Bacitracine, + Propolis;  [] Treatment prescribed: ...

## 2018-08-01 NOTE — MR AVS SNAPSHOT
After Visit Summary   8/1/2018    Breanna Fischer    MRN: [id number]           Patient Information     Date Of Birth          1975        Visit Information        Provider Department      8/1/2018 6:30 PM Luis Alberto Myers MD [facility] D[facility]at[facility]        Today's Diagnoses     Allergic contact dermatitis due to other agents    -  1       Follow-ups after your visit        Your next 10 appointments already scheduled     Aug 03, 2018  7:45 AM CDT   (Arrive by 7:30 AM)   Return Allergy with MD SAMUEL Ozuna [facility] Dermatology ([facility])    [address] [place] [id number] [phone]              Who to contact     Please call your clinic at [phone] to:    Ask questions about your health    Make or cancel appointments    Discuss your medicines    Learn about your test results    Speak to your doctor            Additional Information About Your Visit        Care EveryWhere ID     This is your Care EveryWhere ID. This could be used by other organizations to access your [place] medical records  [id number]         Blood Pressure from Last 3 Encounters:   06/29/18 124/80   12/28/17 130/84   06/27/02 120/74    Weight from Last 3 Encounters:   12/28/17 122.3 kg (269 lb 9.6 oz)   02/05/01 100.2 kg (221 lb)              Today, you had the following     No orders found for display       Primary Care Provider Office Phone # Fax #    Lindsey Sanchez, APRN [facility] [phone] [address] [facility] [address]        Equal Access to Services     RITA GROVE AH: Hadii aad ku hadasho Soomaali, waaxda luqadaha, qaybta kaalmada adeegyada, waxsmitha johan hayrudin asher hurst'flaquito . So [facility] [phone].    ATENCIÓN: Si habla español, tiene a esposito disposición servicios gratuitos de asistencia lingüística. Llame al [phone].    We comply with applicable federal civil rights laws and [place] laws. We do not discriminate on the  basis of race, color, national origin, age, disability, sex, sexual orientation, or gender identity.            Thank you!     Thank you for choosing [facility] DERMATOLOGY  for your care. Our goal is always to provide you with excellent care. Hearing back from our patients is one way we can continue to improve our services. Please take a few minutes to complete the written survey that you may receive in the mail after your visit with us. Thank you!             Your Updated Medication List - Protect others around you: Learn how to safely use, store and throw away your medicines at www.disposemymeds.org.          This list is accurate as of 8/1/18  6:55 PM.  Always use your most recent med list.                   Brand Name Dispense Instructions for use Diagnosis    Castellani Paint 1.5 % Liqd     29.5 mL    Externally apply topically as needed Paint every evening on the excematous lesions on the legs before adding the topical steroid. As alternative pharmacy could provide Gentian violet    Allergic contact dermatitis due to other agents       CETIRIZINE HCL PO      Take by mouth daily        losartan 25 MG tablet    COZAAR    90 tablet    Take 1 tablet (25 mg) by mouth daily    Benign essential hypertension       mometasone 0.1 % cream    ELOCON    50 g    Apply topically daily    Allergic contact dermatitis due to other agents

## 2018-08-03 ENCOUNTER — OFFICE VISIT (OUTPATIENT)
Dept: DERMATOLOGY | Facility: CLINIC | Age: 43
End: 2018-08-03
Payer: COMMERCIAL

## 2018-08-03 DIAGNOSIS — L23.2 ALLERGIC CONTACT DERMATITIS DUE TO COSMETICS: Primary | ICD-10-CM

## 2018-08-03 ASSESSMENT — PAIN SCALES - GENERAL: PAINLEVEL: NO PAIN (0)

## 2018-08-03 NOTE — NURSING NOTE
Dermatology Rooming Note    Breanna Fischer's goals for this visit include:   Chief Complaint   Patient presents with     Derm Problem     Day 5 patch testing.        Nichol Scanlon, EMT

## 2018-08-03 NOTE — PROGRESS NOTES
STANDARD Series    No Substance 3 days 5 days remarks   1 JUDIE mix (benzocaine, cinchocain, procain) - -    2 Colophony - ++    3  2-mercaptobenzothiazole  - -     4 Methylisothiazolinone - -    5 CARBA mix - -    6 THIURAM mix - -    7 Bisphenol A Epoxy resin - -    8 a-qfqw-petrzcfjvji-formaldehyde resin - -    9 MERCAPTO mix - -    10 BLACK RUBBER mix - -    11 Potassium DICHROMATE  -  -    12 Myroxylon pereirae resin (balsam of [place]) - -    13 Nickel (II) sulphate, 6H2O ++ ++/+++    14 Mixed dialkyl thiourea - -    15 PARABEN mix - -    16 Methyldibromo glutaro-nitrile/phenoxyethanole - -    17 FRAGRANCE I mix ++ ++    18 Bronopol (2-Bromo-2-nitropropane-1,3-diol) - -    19 Lyral - -    20 Tixocortol-21- pivalate - -    21 Diazolidiyl urea (germall II; Formaldehyde releaser) - -    22 Methyl methacrylate - -    23 Cobalt (II) chloride, 6H2O (+) -    24 FRAGRANCE II mix + +    25 COMPOSITAE mix + +    26 Benzoylperoxide - -    27 Bacitracin ++ ++    28 Formaldehyde (+) -    29 Methylchloroisothiazolinone/Methylisothiazolinone (Kathon CG) (+) -    30 CORTICOSTEROID mix - -    31 Sodium Lauryl Sulfate - -    32 Lanolin alcohol - -    33 Turpentine - -    34 Cetylstearyl alcohol - -    35 Chlorhexidine dicluconate - -    36 Budenoside - -    37 Imidazolidinyl Urea (Germall 115; Formaldehyde releaser) - -    38 Ethyl-2 Cyanoacrylate - -    39 Quaternium 15 (Dowicil 200) - -    40 Decyl Glucoside - -      Remarks:              EMULSIFIERS & ADDITIVES        No Substance 2 days 4 days remarks   41 Polyethylene glycol-400 - -    42 Cocamidopropylbetaine - -    43 Amerchol L101 - -    44 Propyleneglycol - -    45 Triethanolamine - -    46 Sorbitane Sesquiolate - -    47 Isopropylmyristate - -    48 Polysorbate 80 (Tween 80) - -    49 Amidoamine (stearamido-propyl dimethylamine) - -    50 Oleamidopropyl dimethylamine - -    51 Lauryl Glucoside - -    52 Coconut diethanolamide (cocamide RADHA) - -    53  2-hydroxy-4methoxy-benzo-phenone (sunscreen) - -    54 Benzophenone-4 (sunscr) - -    55 Propolis + -    56 Dexpanthenol - -    57 Carboxymethylcellulose sodium - -    58 Abitol - -    59 tert-butylhydroquinone - -    60 Benzyl salicylate (sunscreen)  - -     antioxidant      61 Dodecyl gallate - -    62 Butylhydroxyanisole (BHA) - -    63 Butylhydroxytoluene (BHT) - -    64 di-a-Tocopherol (Vit E) - -    65 Propyl gallate - -      Remarks:                  CORTICOSTEROIDS         No Substance 2 days 4 days remarks Allergy  class   66 Amcinonide - -  B   67 Betametasone-17,21 dipropionate - -  D1   68 Desoximetasone - -  C   69 Betamethasone-17-valerate - -  D1   70 Dexamethasone - -  C   71 Hydrocortisone - -  A   72 Clobetasol-17-propionate - -  D1   73 Dexamethasone-21-phosphate - -  C   74 Hydrocortisone-17 butyrate - -  D2   75 Prednisolone - -  A   76 Mometason Furoate - -  D1   77 Triamcinolone acetonide - -  B   78 Methylprednisolone aceponate - -  D2   79 Hydrocortisone-21-acetate - -  A   80 Prednicarbate - -  D2   Remarks:      Group Characteristics of group Generic name Name  cross reactions   A Hydrocortisone   Cloprednole, Fludrocortisone acétate, Hydrocortison acetate, Methylprednisolone, Prednisolone, Tixocortolpivalate Alfacortone, Fucidin H, Dermacalm, Hexacortone, Premandole, Imacort With group D2   B Triamcinolone-acetonide   Budenoside (R-isomer), Amcinonide, Desonide, Fluocinolone acetonide, Triamcinolone acetonide Locapred, Locatop  Synalar, Pevisone, Ken[place]ort -   C Betamethasone (Without Cecile)   Betamethasone, Dexamethasone, Flumethasone pivalate, Halomethasone Daivobet, Dexasalyl, Locasalen,   -   D1 Betamethasone-diproprionate   Betamethasone dipropionate, Betamethasone-17-valerate, Clobetasole-propionate, Fluticasone propionate, Mometasone furoate Betnovate, Diprogenta, Diprosalic, Diprosone, Celestoderm, Fucicort,  Cutivate, Axotide, Elocom -   D2 Methylprednisolone-aceponate    Hydrocortisone-aceponate, Hydrocortisone-buteprate, Hydrocortisone-17-butyrate, Methylprednisolone aceponate, Prednicarbate Locoïd, Advantan,  Prednitop With group A and Budesonide (S-isomer)     PRESERVATIVES & ANTIMICROBIALS         No Substance 2 days 4 days remarks   81  1,2-benzisothiazoline-3-one, sodium salt - -    82  1,3,5-alejandro(2-hydroxyethyl) -hexahydrotriazine(Grotan BK) - -    83  2-yinfdvqvhmjjo-5-nitro-1,3-propanediol - -    84  3,4,4'-triclocarban - -    85 4-Chloro-3-cresol (PCMC) - -    86 4-Chloro-4-xylénol (PCMX) - -    87 7-ethylbicyclooxazolidine (biopan CS 1246) - -    88 Benzalkonium chloride - -    89 Benzyl alcohol - -    90 Cetalkonium chloride - -    91 Cetylpyrimidine chloride  - -    93 DMDM Hydantoin - -    94 Glutaraldehyde - -    95 Triclosan - -    96 Glyoxal trimeric dihydrate - -    97 Iodopropynyl butylcarbamate - -    98 2-n-Octyl-4-isothiazolin-3-one - -    99 Iodoform - -    100 (Nitrobutyl)morpholine/(Ethylnitrotrimethylene) dimorpholine(Biopan ) - -    101 Phenoxyethanol - -    102 Phenylsalicylate - -    103 Povidone Iodine - -    104 Sodium Benzoate - -    105 Sodium Disulfite - -    106 Sorbic Acid - -    107 Thimerosal - -     Parabens      108 butyl-p-hydroxybenzoate - -    109 ethyl-p-hydroxybenzoate - -    110 methyl-p-hydroxybenzoate - -    111 Propyl-p-hydroxybenzoate - -    112 A&D Ointment - -    113 Aquaphor- Advanced Therapy - -        Remarks:                Results of patch tests:                         Interpretation:    - Negative                    A    = Allergic      (+) Erythema    TI   = Toxic/irritant   + E + Infiltration    RaP = Relevance at Present     ++ E/I + Papulovesicle   Rpr  = Relevance Previously     +++ E/I/P + Blister     nR   = No Relevance

## 2018-08-03 NOTE — PATIENT INSTRUCTIONS
STANDARD Series    No Substance 3 days 5 days remarks   1 JUDIE mix (benzocaine, cinchocain, procain) - -    2 Colophony - ++    3  2-mercaptobenzothiazole  - -     4 Methylisothiazolinone - -    5 CARBA mix - -    6 THIURAM mix - -    7 Bisphenol A Epoxy resin - -    8 b-cuir-gyddfxfxvgl-formaldehyde resin - -    9 MERCAPTO mix - -    10 BLACK RUBBER mix - -    11 Potassium DICHROMATE  -  -    12 Myroxylon pereirae resin (balsam of [place]) - -    13 Nickel (II) sulphate, 6H2O ++ ++/+++    14 Mixed dialkyl thiourea - -    15 PARABEN mix - -    16 Methyldibromo glutaro-nitrile/phenoxyethanole - -    17 FRAGRANCE I mix ++ ++    18 Bronopol (2-Bromo-2-nitropropane-1,3-diol) - -    19 Lyral - -    20 Tixocortol-21- pivalate - -    21 Diazolidiyl urea (germall II; Formaldehyde releaser) - -    22 Methyl methacrylate - -    23 Cobalt (II) chloride, 6H2O (+) -    24 FRAGRANCE II mix + +    25 COMPOSITAE mix + +    26 Benzoylperoxide - -    27 Bacitracin ++ ++    28 Formaldehyde (+) -    29 Methylchloroisothiazolinone/Methylisothiazolinone (Kathon CG) (+) -    30 CORTICOSTEROID mix - -    31 Sodium Lauryl Sulfate - -    32 Lanolin alcohol - -    33 Turpentine - -    34 Cetylstearyl alcohol - -    35 Chlorhexidine dicluconate - -    36 Budenoside - -    37 Imidazolidinyl Urea (Germall 115; Formaldehyde releaser) - -    38 Ethyl-2 Cyanoacrylate - -    39 Quaternium 15 (Dowicil 200) - -    40 Decyl Glucoside - -      Remarks:              EMULSIFIERS & ADDITIVES        No Substance 2 days 4 days remarks   41 Polyethylene glycol-400 - -    42 Cocamidopropylbetaine - -    43 Amerchol L101 - -    44 Propyleneglycol - -    45 Triethanolamine - -    46 Sorbitane Sesquiolate - -    47 Isopropylmyristate - -    48 Polysorbate 80 (Tween 80) - -    49 Amidoamine (stearamido-propyl dimethylamine) - -    50 Oleamidopropyl dimethylamine - -    51 Lauryl Glucoside - -    52 Coconut diethanolamide (cocamide RADHA) - -    53  2-hydroxy-4methoxy-benzo-phenone (sunscreen) - -    54 Benzophenone-4 (sunscr) - -    55 Propolis + -    56 Dexpanthenol - -    57 Carboxymethylcellulose sodium - -    58 Abitol - -    59 tert-butylhydroquinone - -    60 Benzyl salicylate (sunscreen)  - -     antioxidant      61 Dodecyl gallate - -    62 Butylhydroxyanisole (BHA) - -    63 Butylhydroxytoluene (BHT) - -    64 di-a-Tocopherol (Vit E) - -    65 Propyl gallate - -      Remarks:                  CORTICOSTEROIDS         No Substance 2 days 4 days remarks Allergy  class   66 Amcinonide - -  B   67 Betametasone-17,21 dipropionate - -  D1   68 Desoximetasone - -  C   69 Betamethasone-17-valerate - -  D1   70 Dexamethasone - -  C   71 Hydrocortisone - -  A   72 Clobetasol-17-propionate - -  D1   73 Dexamethasone-21-phosphate - -  C   74 Hydrocortisone-17 butyrate - -  D2   75 Prednisolone - -  A   76 Mometason Furoate - -  D1   77 Triamcinolone acetonide - -  B   78 Methylprednisolone aceponate - -  D2   79 Hydrocortisone-21-acetate - -  A   80 Prednicarbate - -  D2   Remarks:      Group Characteristics of group Generic name Name  cross reactions   A Hydrocortisone   Cloprednole, Fludrocortisone acétate, Hydrocortison acetate, Methylprednisolone, Prednisolone, Tixocortolpivalate Alfacortone, Fucidin H, Dermacalm, Hexacortone, Premandole, Imacort With group D2   B Triamcinolone-acetonide   Budenoside (R-isomer), Amcinonide, Desonide, Fluocinolone acetonide, Triamcinolone acetonide Locapred, Locatop  Synalar, Pevisone, [place] -   C Betamethasone (Without Cecile)   Betamethasone, Dexamethasone, Flumethasone pivalate, Halomethasone Daivobet, Dexasalyl, Locasalen,   -   D1 Betamethasone-diproprionate   Betamethasone dipropionate, Betamethasone-17-valerate, Clobetasole-propionate, Fluticasone propionate, Mometasone furoate Betnovate, Diprogenta, Diprosalic, Diprosone, Celestoderm, Fucicort,  Cutivate, Axotide, Elocom -   D2 Methylprednisolone-aceponate    Hydrocortisone-aceponate, Hydrocortisone-buteprate, Hydrocortisone-17-butyrate, Methylprednisolone aceponate, Prednicarbate Locoïd, Advantan,  Prednitop With group A and Budesonide (S-isomer)     PRESERVATIVES & ANTIMICROBIALS         No Substance 2 days 4 days remarks   81  1,2-benzisothiazoline-3-one, sodium salt - -    82  1,3,5-alejandro(2-hydroxyethyl) -hexahydrotriazine(Grotan BK) - -    83  3-eyrtdshjxxvov-3-nitro-1,3-propanediol - -    84  3,4,4'-triclocarban - -    85 4-Chloro-3-cresol (PCMC) - -    86 4-Chloro-4-xylénol (PCMX) - -    87 7-ethylbicyclooxazolidine (biopan CS 1246) - -    88 Benzalkonium chloride - -    89 Benzyl alcohol - -    90 Cetalkonium chloride - -    91 Cetylpyrimidine chloride  - -    93 DMDM Hydantoin - -    94 Glutaraldehyde - -    95 Triclosan - -    96 Glyoxal trimeric dihydrate - -    97 Iodopropynyl butylcarbamate - -    98 2-n-Octyl-4-isothiazolin-3-one - -    99 Iodoform - -    100 (Nitrobutyl)morpholine/(Ethylnitrotrimethylene) dimorpholine(Biopan ) - -    101 Phenoxyethanol - -    102 Phenylsalicylate - -    103 Povidone Iodine - -    104 Sodium Benzoate - -    105 Sodium Disulfite - -    106 Sorbic Acid - -    107 Thimerosal - -     Parabens      108 butyl-p-hydroxybenzoate - -    109 ethyl-p-hydroxybenzoate - -    110 methyl-p-hydroxybenzoate - -    111 Propyl-p-hydroxybenzoate - -    112 A&D Ointment - -    113 Aquaphor- Advanced Therapy - -        Remarks:                Results of patch tests:                         Interpretation:    - Negative                    A    = Allergic      (+) Erythema    TI   = Toxic/irritant   + E + Infiltration    RaP = Relevance at Present     ++ E/I + Papulovesicle   Rpr  = Relevance Previously     +++ E/I/P + Blister     nR   = No Relevance

## 2018-08-03 NOTE — LETTER
8/3/2018       RE: Breanna Fischer  [address] [place] [address]     Dear Colleague,    Thank you for referring your patient, Breanna Fischer, to the [facility] DERMATOLOGY at [facility]. Please see a copy of my visit note below.    [facility] Dermatology Note      Dermatology Problem List:    Specialty Problems     None          CC:   Derm Problem (Day 5 patch testing. )        Encounter Date: Aug 3, 2018    History of Present Illness:  Ms. Breanna Fischer is a 43 year old female who presents as a referral from [place].    Since ten years left leg >> right leg eczematous lesions. First on feet and then moved from feet to lower limbs. Sometimes subacute dry and sometimes weeping lesions. No other body parts involved.    Locally paraffin type ointment A+D ointment with Paraffin and Lanolin; wash with water    Past Medical History:   There is no problem list on file for this patient.    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 She has never used smokeless tobacco. She reports that she does not drink alcohol or use illicit drugs.      Family History:  Family History   Problem Relation Age of Onset     Obesity Mother      Thyroid Disease Mother      Thyroid Disease Father      Breast Cancer Paternal Grandmother      Obesity Sister      Thyroid Disease Sister      Melanoma No family hx of      Skin Cancer No family hx of    dad had a  lot of allergies (RC to cats, dogs, grass)  Sister has eczemas (atopic eczema and contact eczemas to fragrances and nummular eczema)    Medications:  Current Outpatient Prescriptions   Medication Sig Dispense Refill     Castellani Paint 1.5 % LIQD Externally apply topically as needed Paint every evening on the excematous lesions on the legs before adding the topical steroid. As alternative pharmacy could provide Gentian violet 29.5 mL 1     CETIRIZINE HCL PO Take by mouth daily       losartan (COZAAR) 25 MG tablet Take 1 tablet (25 mg) by mouth daily 90 tablet 3     mometasone (ELOCON) 0.1 % cream Apply topically daily 50 g 3         Review of Systems:  -As per HPI  -Constitutional: The patient denies fatigue, fevers, chills, unintended weight loss, and night sweats.  -HEENT: Patient denies nonhealing oral sores.  -Skin: As above in HPI. No additional skin concerns.    Physical exam:  Vitals: There were no vitals taken for this visit.  GEN: This is a well developed, well-nourished female in no acute distress, in a pleasant mood.    SKIN: Focused examination of the legs, hands, face was performed.  Eczematous lesions on fingers and particularly on the legs much improved on Gentian violet and topical steroids. However, still Oedemas on the legs    -No other lesions of concern on areas examined.         Order for PATCH TESTS      [] Inpatient / Araan....   Bed ....  [x] Outpatient    Skin Atopy           :       [x] Yes   [] No  Rhinitis/Sinusitis :       [x] Yes   [] No  Allergic Asthma   :       [] Yes   [x] No  Leg ulcers            :       [] Yes   [x] No  Hand eczema       :       [x] Yes   [] No    Leading hand :  [x] R [] L               [] Ambidextrous                      Reason for tests (Suspected allergy): stasis eczema with possible contact allergy on atopic dermatitis background  Known previous allergies: fragrances?    [] Standardized panels  [x] Standard panel (40 tests)  [x] Preservatives &  Antimicrobials (31 tests)  [x] Emulsifiers & Additives (25 tests)   [] Perfumes/Flavours & Plants (25 tests)  [] Hairdresser panel (12 tests)  [] Rubber Chemicals (22 tests)  [] Plastics (26 tests)  [] Colorants/Dyes/Food additives (20 tests)  [] Metals (implants/dental) (23 tests)  [] Local anaesthetics/NSAIDs (12 tests)  [] Antibiotics & Antimycotics (14 tests)   [x] Corticosteroids (15 tests)   [] Photopatch test (32 tests)   [] others: ...    [x] Patient's own products: A&D oint    DO NOT test if chemical or biological identity is unknown!     always ask from patient the product information and safety sheets (MSDS)     [] Patient needs consultation with Allergy team  [x] Tests discussed with Allergy team (can have direct appointment for patch tests)    ==> put patch tests on back as prescribed on 07/30/2018 around 6pm (back without lesions)   - 1st readings 08/01/2018: patch tests were well in-situ; several reactions: ++ Nickel, ++ Fragrance I, + Fragrance II, + Compositae mix, ++ Bacitracine, + Propolis; redness form Bacitracine and Fragrances/Compositae spills over to Formaldehyde, Kathon CG and [place].    - 2nd readings 08/03/2018: ++ Nickel, ++ Fragrance I, ++ Colophony, ++ Bacitracine, + Fragrance II, + Compositae mix; redness form Bacitracine and Fragrances/Compositae spills over to Formaldehyde, Propolis now negative (this means that was an   irritant reaction)      RESULTS & EVALUATION of PATCH TESTS      Patch test readings after     [x] 2 days, [] 3 days [x] 4 days, [] 5 days,   Other duration: ...    [] Allergic reaction diagnosed against: ++ Nickel, ++ Bacitracine,  ++ Colophony, ++ Fragrance I,  Fragrance II, + Compositae mix      Interpretation/ remarks:   Patient has relevant contact dermatitis to Fragrances (crossreacting to Colophony and probably Compositae) and unrelated to fragrances to Nickel and Bacitracine. She should avoid everything that has fragrances in it. Use on the leg just pure  paraffin and unscented soaps.     [x] Patient information given   [] [facility] information   [x] SmartPractice information given for: ++ Nickel, ++ Fragrance I, + Fragrance II, + Compositae mix, ++ Bacitracine,     Repeated on the upper arms following allergens: right arm Compositae mix and left arm Formaldehyde (they were close to the Bacitracine and not sure if really allergy or spreading reaction from Bacitracin). Patient will remove patch on Sunday or Monday and call us if any reaction.    Final Diagnosis:    ==> stasis eczema with proven allergic contact dermatitis to Fragrances/Colophony/Compositae and Bacitracine and Nickel      [x] Treatment prescribed:       Continue for a few days Gentian violet paint before using the Mometasone cream on the leg.    Prescription of compression stockings (measure in the morning)    Use pure paraffin (Vaseline) for later phase if dry    Use unscented Vanicream soap      Discussed with patient for 30min the results      STANDARD Series    No Substance 3 days 5 days remarks   1 JUDIE mix (benzocaine, cinchocain, procain) - -    2 Colophony - ++    3  2-mercaptobenzothiazole  - -     4 Methylisothiazolinone - -    5 CARBA mix - -    6 THIURAM mix - -    7 Bisphenol A Epoxy resin - -    8 u-dqyg-otsatjqnzxa-formaldehyde resin - -    9 MERCAPTO mix - -    10 BLACK RUBBER mix - -    11 Potassium DICHROMATE  -  -    12 Myroxylon pereirae resin (balsam of [place]) - -    13 Nickel (II) sulphate, 6H2O ++ ++/+++    14 Mixed dialkyl thiourea - -    15 PARABEN mix - -    16 Methyldibromo glutaro-nitrile/phenoxyethanole - -    17 FRAGRANCE I mix ++ ++    18 Bronopol (2-Bromo-2-nitropropane-1,3-diol) - -    19 Lyral - -    20 Tixocortol-21- pivalate - -    21 Diazolidiyl urea (germall II; Formaldehyde releaser) - -    22 Methyl methacrylate - -    23 Cobalt (II) chloride, 6H2O (+) -    24 FRAGRANCE II mix + +    25 COMPOSITAE mix + +    26 Benzoylperoxide - -    27 Bacitracin ++ ++    28  Formaldehyde (+) -    29 Methylchloroisothiazolinone/Methylisothiazolinone (Kathon CG) (+) -    30 CORTICOSTEROID mix - -    31 Sodium Lauryl Sulfate - -    32 Lanolin alcohol - -    33 Turpentine - -    34 Cetylstearyl alcohol - -    35 Chlorhexidine dicluconate - -    36 Budenoside - -    37 Imidazolidinyl Urea (Germall 115; Formaldehyde releaser) - -    38 Ethyl-2 Cyanoacrylate - -    39 Quaternium 15 (Dowicil 200) - -    40 Decyl Glucoside - -      Remarks:              EMULSIFIERS & ADDITIVES        No Substance 2 days 4 days remarks   41 Polyethylene glycol-400 - -    42 Cocamidopropylbetaine - -    43 Amerchol L101 - -    44 Propyleneglycol - -    45 Triethanolamine - -    46 Sorbitane Sesquiolate - -    47 Isopropylmyristate - -    48 Polysorbate 80 (Tween 80) - -    49 Amidoamine (stearamido-propyl dimethylamine) - -    50 Oleamidopropyl dimethylamine - -    51 Lauryl Glucoside - -    52 Coconut diethanolamide (cocamide RADHA) - -    53 2-hydroxy-4methoxy-benzo-phenone (sunscreen) - -    54 Benzophenone-4 (sunscr) - -    55 Propolis + -    56 Dexpanthenol - -    57 Carboxymethylcellulose sodium - -    58 Abitol - -    59 tert-butylhydroquinone - -    60 Benzyl salicylate (sunscreen)  - -     antioxidant      61 Dodecyl gallate - -    62 Butylhydroxyanisole (BHA) - -    63 Butylhydroxytoluene (BHT) - -    64 di-a-Tocopherol (Vit E) - -    65 Propyl gallate - -      Remarks:                  CORTICOSTEROIDS         No Substance 2 days 4 days remarks Allergy  class   66 Amcinonide - -  B   67 Betametasone-17,21 dipropionate - -  D1   68 Desoximetasone - -  C   69 Betamethasone-17-valerate - -  D1   70 Dexamethasone - -  C   71 Hydrocortisone - -  A   72 Clobetasol-17-propionate - -  D1   73 Dexamethasone-21-phosphate - -  C   74 Hydrocortisone-17 butyrate - -  D2   75 Prednisolone - -  A   76 Mometason Furoate - -  D1   77 Triamcinolone acetonide - -  B   78 Methylprednisolone aceponate - -  D2   79  Hydrocortisone-21-acetate - -  A   80 Prednicarbate - -  D2   Remarks:      Group Characteristics of group Generic name Name  cross reactions   A Hydrocortisone   Cloprednole, Fludrocortisone acétate, Hydrocortison acetate, Methylprednisolone, Prednisolone, Tixocortolpivalate Alfacortone, Fucidin H, Dermacalm, Hexacortone, Premandole, Imacort With group D2   B Triamcinolone-acetonide   Budenoside (R-isomer), Amcinonide, Desonide, Fluocinolone acetonide, Triamcinolone acetonide Locapred, Locatop  Synalar, Pevisone, [place] -   C Betamethasone (Without Cecile)   Betamethasone, Dexamethasone, Flumethasone pivalate, Halomethasone Daivobet, Dexasalyl, Locasalen,   -   D1 Betamethasone-diproprionate   Betamethasone dipropionate, Betamethasone-17-valerate, Clobetasole-propionate, Fluticasone propionate, Mometasone furoate Betnovate, Diprogenta, Diprosalic, Diprosone, Celestoderm, Fucicort,  Cutivate, Axotide, Elocom -   D2 Methylprednisolone-aceponate   Hydrocortisone-aceponate, Hydrocortisone-buteprate, Hydrocortisone-17-butyrate, Methylprednisolone aceponate, Prednicarbate Locoïd, Advantan,  Prednitop With group A and Budesonide (S-isomer)     PRESERVATIVES & ANTIMICROBIALS         No Substance 2 days 4 days remarks   81  1,2-benzisothiazoline-3-one, sodium salt - -    82  1,3,5-alejandro(2-hydroxyethyl) -hexahydrotriazine(Grotan BK) - -    83  9-giixqbvkcpjkl-6-nitro-1,3-propanediol - -    84  3,4,4'-triclocarban - -    85 4-Chloro-3-cresol ([facility]) - -    86 4-Chloro-4-xylénol (PCMX) - -    87 7-ethylbicyclooxazolidine (biopan CS 1246) - -    88 Benzalkonium chloride - -    89 Benzyl alcohol - -    90 Cetalkonium chloride - -    91 Cetylpyrimidine chloride  - -    93 DMDM Hydantoin - -    94 Glutaraldehyde - -    95 Triclosan - -    96 Glyoxal trimeric dihydrate - -    97 Iodopropynyl butylcarbamate - -    98 2-n-Octyl-4-isothiazolin-3-one - -    99 Iodoform - -    100 (Nitrobutyl)morpholine/(Ethylnitrotrimethylene)  dimorpholine(Biopan ) - -    101 Phenoxyethanol - -    102 Phenylsalicylate - -    103 Povidone Iodine - -    104 Sodium Benzoate - -    105 Sodium Disulfite - -    106 Sorbic Acid - -    107 Thimerosal - -     Parabens      108 butyl-p-hydroxybenzoate - -    109 ethyl-p-hydroxybenzoate - -    110 methyl-p-hydroxybenzoate - -    111 Propyl-p-hydroxybenzoate - -    112 A&D Ointment - -    113 Aquaphor- Advanced Therapy - -        Remarks:                Results of patch tests:                         Interpretation:    - Negative                    A    = Allergic      (+) Erythema    TI   = Toxic/irritant   + E + Infiltration    RaP = Relevance at Present     ++ E/I + Papulovesicle   Rpr  = Relevance Previously     +++ E/I/P + Blister     nR   = No Relevance      Again, thank you for allowing me to participate in the care of your patient.      Sincerely,    Luis Alberto Myers MD

## 2018-08-03 NOTE — PROGRESS NOTES
[facility] Dermatology Note      Dermatology Problem List:    Specialty Problems     None          CC:   Derm Problem (Day 5 patch testing. )        Encounter Date: Aug 3, 2018    History of Present Illness:  Ms. Breanna Fischer is a 43 year old female who presents as a referral from [place].    Since ten years left leg >> right leg eczematous lesions. First on feet and then moved from feet to lower limbs. Sometimes subacute dry and sometimes weeping lesions. No other body parts involved.    Locally paraffin type ointment A+D ointment with Paraffin and Lanolin; wash with water    Past Medical History:   There is no problem list on file for this patient.    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 She has never used smokeless tobacco. She reports that she does not drink alcohol or use illicit drugs.      Family History:  Family History   Problem Relation Age of Onset     Obesity Mother      Thyroid Disease Mother      Thyroid Disease Father      Breast Cancer Paternal Grandmother      Obesity Sister      Thyroid Disease Sister      Melanoma No family hx of      Skin Cancer No family hx of    dad had a lot of allergies (RC to cats, dogs, grass)  Sister has eczemas (atopic eczema and contact eczemas to fragrances and nummular eczema)    Medications:  Current Outpatient Prescriptions   Medication Sig Dispense Refill     Castellani Paint 1.5 % LIQD Externally apply  topically as needed Paint every evening on the excematous lesions on the legs before adding the topical steroid. As alternative pharmacy could provide Gentian violet 29.5 mL 1     CETIRIZINE HCL PO Take by mouth daily       losartan (COZAAR) 25 MG tablet Take 1 tablet (25 mg) by mouth daily 90 tablet 3     mometasone (ELOCON) 0.1 % cream Apply topically daily 50 g 3         Review of Systems:  -As per HPI  -Constitutional: The patient denies fatigue, fevers, chills, unintended weight loss, and night sweats.  -HEENT: Patient denies nonhealing oral sores.  -Skin: As above in HPI. No additional skin concerns.    Physical exam:  Vitals: There were no vitals taken for this visit.  GEN: This is a well developed, well-nourished female in no acute distress, in a pleasant mood.    SKIN: Focused examination of the legs, hands, face was performed.  Eczematous lesions on fingers and particularly on the legs much improved on Gentian violet and topical steroids. However, still Oedemas on the legs    -No other lesions of concern on areas examined.         Order for PATCH TESTS      [] Inpatient / [place]....   Bed ....  [x] Outpatient    Skin Atopy           :       [x] Yes   [] No  Rhinitis/Sinusitis :       [x] Yes   [] No  Allergic Asthma   :       [] Yes   [x] No  Leg ulcers            :       [] Yes   [x] No  Hand eczema       :       [x] Yes   [] No    Leading hand :  [x] R [] L               [] Ambidextrous                      Reason for tests (Suspected allergy): stasis eczema with possible contact allergy on atopic dermatitis background  Known previous allergies: fragrances?    [] Standardized panels  [x] Standard panel (40 tests)  [x] Preservatives & Antimicrobials (31 tests)  [x] Emulsifiers & Additives (25 tests)   [] Perfumes/Flavours & Plants (25 tests)  [] Hairdresser panel (12 tests)  [] Rubber Chemicals (22 tests)  [] Plastics (26 tests)  [] Colorants/Dyes/Food additives (20 tests)  [] Metals (implants/dental) (23  tests)  [] Local anaesthetics/NSAIDs (12 tests)  [] Antibiotics & Antimycotics (14 tests)   [x] Corticosteroids (15 tests)   [] Photopatch test (32 tests)   [] others: ...    [x] Patient's own products: A&D oint    DO NOT test if chemical or biological identity is unknown!     always ask from patient the product information and safety sheets (MSDS)     [] Patient needs consultation with Allergy team  [x] Tests discussed with Allergy team (can have direct appointment for patch tests)    ==> put patch tests on back as prescribed on 07/30/2018 around 6pm (back without lesions)   - 1st readings 08/01/2018: patch tests were well in-situ; several reactions: ++ Nickel, ++ Fragrance I, + Fragrance II, + Compositae mix, ++ Bacitracine, + Propolis; redness form Bacitracine and Fragrances/Compositae spills over to Formaldehyde, Kathon CG and [place].    - 2nd readings 08/03/2018: ++ Nickel, ++ Fragrance I, ++ Colophony, ++ Bacitracine, + Fragrance II, + Compositae mix; redness form Bacitracine and Fragrances/Compositae spills over to Formaldehyde, Propolis now negative (this means that was an   irritant reaction)      RESULTS & EVALUATION of PATCH TESTS      Patch test readings after     [x] 2 days, [] 3 days [x] 4 days, [] 5 days,   Other duration: ...    [] Allergic reaction diagnosed against: ++ Nickel, ++ Bacitracine,  ++ Colophony, ++ Fragrance I,  Fragrance II, + Compositae mix      Interpretation/ remarks:   Patient has relevant contact dermatitis to Fragrances (crossreacting to Colophony and probably Compositae) and unrelated to fragrances to Nickel and Bacitracine. She should avoid everything that has fragrances in it. Use on the leg just pure paraffin and unscented soaps.     [x] Patient information given   [] ACSD information   [x] SmartPractice information given for: ++ Nickel, ++ Fragrance I, + Fragrance II, + Compositae mix, ++ Bacitracine,     Repeated on the upper arms following allergens: right arm Compositae  mix and left arm Formaldehyde (they were close to the Bacitracine and not sure if really allergy or spreading reaction from Bacitracin). Patient will remove patch on Sunday or Monday and call us if any reaction ==> no reaction to any of these compounds and therefore no allergy to Formaldehyde or Compositae. This means that the reaction spread out from Bacitracine.     Final Diagnosis:    ==> stasis eczema with proven allergic contact dermatitis to Fragrances/Colophony and Bacitracine and Nickel      [x] Treatment prescribed:       Continue for a few days Gentian violet paint before using the Mometasone cream on the leg.    Prescription of compression stockings (measure in the morning)    Use pure paraffin (Vaseline) for later phase if dry    Use unscented Vanicream soap      Discussed with patient for 30min the results

## 2018-08-03 NOTE — MR AVS SNAPSHOT
After Visit Summary   8/3/2018    Breanna Fischer    MRN: [id number]           Patient Information     Date Of Birth          1975        Visit Information        Provider Department      8/3/2018 7:45 AM Luis Alberto Myers MD [facility] Dermatology        Today's Diagnoses     Allergic contact dermatitis due to cosmetics    -  1       Follow-ups after your visit        Who to contact     Please call your clinic at [phone] to:    Ask questions about your health    Make or cancel appointments    Discuss your medicines    Learn about your test results    Speak to your doctor            Additional Information About Your Visit        Care EveryWhere ID     This is your Care EveryWhere ID. This could be used by other organizations to access your [facility] medical records  [id number]         Blood Pressure from Last 3 Encounters:   06/29/18 124/80   12/28/17 130/84   06/27/02 120/74    Weight from Last 3 Encounters:   12/28/17 122.3 kg (269 lb 9.6 oz)   02/05/01 100.2 kg (221 lb)              Today, you had the following     No orders found for display       Primary Care Provider Office Phone # Fax #    Lindsey Madison Daniel, APRN [facility] [phone] [address] [facility] [address]        Equal Access to Services     [place] [facility]: Hadii aad ku hadasho Soomaali, waaxda luqadaha, qaybta kaalmada adeegyada, ester prado hayrudin asher cabrera. So [facility] [phone].    ATENCIÓN: Si habla español, tiene a esposito disposición servicios gratuitos de asistencia lingüística. Llame al [phone].    We comply with applicable federal civil rights laws and [place] laws. We do not discriminate on the basis of race, color, national origin, age, disability, sex, sexual orientation, or gender identity.            Thank you!     Thank you for choosing [facility] DERMATOLOGY  for your care. Our goal is always to provide you with excellent care. Hearing back from our patients is one way we can  continue to improve our services. Please take a few minutes to complete the written survey that you may receive in the mail after your visit with us. Thank you!             Your Updated Medication List - Protect others around you: Learn how to safely use, store and throw away your medicines at www.disposemymeds.org.          This list is accurate as of 8/3/18  9:45 AM.  Always use your most recent med list.                   Brand Name Dispense Instructions for use Diagnosis    Castellani Paint 1.5 % Liqd     29.5 mL    Externally apply topically as needed Paint every evening on the excematous lesions on the legs before adding the topical steroid. As alternative pharmacy could provide Gentian violet    Allergic contact dermatitis due to other agents       CETIRIZINE HCL PO      Take by mouth daily        losartan 25 MG tablet    COZAAR    90 tablet    Take 1 tablet (25 mg) by mouth daily    Benign essential hypertension       mometasone 0.1 % cream    ELOCON    50 g    Apply topically daily    Allergic contact dermatitis due to other agents

## 2018-08-06 ENCOUNTER — TELEPHONE (OUTPATIENT)
Dept: DERMATOLOGY | Facility: CLINIC | Age: 43
End: 2018-08-06

## 2018-08-06 NOTE — TELEPHONE ENCOUNTER
[facility] Call Center    Phone Message    May a detailed message be left on voicemail: yes    Reason for Call: Other: The pt called with an update on the patch testing. The tests on both biceps are negative, no reaction at all. Please call the pt to discuss. Thanks.     Action Taken: Message routed to:  [facility] ([facility] amando schafer

## 2018-08-15 NOTE — TELEPHONE ENCOUNTER
" \"Repeated on the upper arms following allergens: right arm Compositae mix and left arm Formaldehyde (they were close to the Bacitracine and not sure if really allergy or spreading reaction from Bacitracin). Patient will remove patch on Sunday or Monday and call us if any reaction ==> no reaction to any of these compounds and therefore no allergy to Formaldehyde or Compositae. This means that the reaction spread out from Bacitracine.     Final Diagnosis:     ==> stasis eczema with proven allergic contact dermatitis to Fragrances/Colophony and Bacitracine and Nickel \"      Patient advised   "

## 2018-08-18 ENCOUNTER — APPOINTMENT (OUTPATIENT)
Dept: CT IMAGING | Facility: CLINIC | Age: 43
End: 2018-08-18
Attending: EMERGENCY MEDICINE
Payer: COMMERCIAL

## 2018-08-18 ENCOUNTER — HOSPITAL ENCOUNTER (EMERGENCY)
Facility: CLINIC | Age: 43
Discharge: HOME | End: 2018-08-18
Attending: EMERGENCY MEDICINE | Admitting: EMERGENCY MEDICINE
Payer: COMMERCIAL

## 2018-08-18 ENCOUNTER — OFFICE VISIT (OUTPATIENT)
Dept: URGENT CARE | Facility: URGENT CARE | Age: 43
End: 2018-08-18
Payer: COMMERCIAL

## 2018-08-18 VITALS
WEIGHT: 272 LBS | RESPIRATION RATE: 20 BRPM | SYSTOLIC BLOOD PRESSURE: 140 MMHG | TEMPERATURE: 97.6 F | HEART RATE: 72 BPM | BODY MASS INDEX: 41.72 KG/M2 | DIASTOLIC BLOOD PRESSURE: 90 MMHG

## 2018-08-18 VITALS
TEMPERATURE: 98.7 F | WEIGHT: 270 LBS | BODY MASS INDEX: 40.92 KG/M2 | HEIGHT: 68 IN | OXYGEN SATURATION: 99 % | HEART RATE: 83 BPM | DIASTOLIC BLOOD PRESSURE: 81 MMHG | SYSTOLIC BLOOD PRESSURE: 146 MMHG | RESPIRATION RATE: 16 BRPM

## 2018-08-18 DIAGNOSIS — R22.1 MASS OF NECK: Primary | ICD-10-CM

## 2018-08-18 DIAGNOSIS — K11.20 PAROTITIS: ICD-10-CM

## 2018-08-18 LAB
ANION GAP SERPL CALCULATED.3IONS-SCNC: 9 MMOL/L (ref 3–14)
BASOPHILS # BLD AUTO: 0 10E9/L (ref 0–0.2)
BASOPHILS NFR BLD AUTO: 0.2 %
BUN SERPL-MCNC: 13 MG/DL (ref 7–30)
CALCIUM SERPL-MCNC: 8.4 MG/DL (ref 8.5–10.1)
CHLORIDE SERPL-SCNC: 108 MMOL/L (ref 94–109)
CO2 SERPL-SCNC: 23 MMOL/L (ref 20–32)
CREAT SERPL-MCNC: 0.59 MG/DL (ref 0.52–1.04)
DIFFERENTIAL METHOD BLD: NORMAL
EOSINOPHIL # BLD AUTO: 0.1 10E9/L (ref 0–0.7)
EOSINOPHIL NFR BLD AUTO: 0.9 %
ERYTHROCYTE [DISTWIDTH] IN BLOOD BY AUTOMATED COUNT: 12.9 % (ref 10–15)
GFR SERPL CREATININE-BSD FRML MDRD: >90 ML/MIN/1.7M2
GLUCOSE SERPL-MCNC: 94 MG/DL (ref 70–99)
HCG SERPL QL: NEGATIVE
HCT VFR BLD AUTO: 40.3 % (ref 35–47)
HGB BLD-MCNC: 13.3 G/DL (ref 11.7–15.7)
IMM GRANULOCYTES # BLD: 0 10E9/L (ref 0–0.4)
IMM GRANULOCYTES NFR BLD: 0.2 %
LYMPHOCYTES # BLD AUTO: 1.3 10E9/L (ref 0.8–5.3)
LYMPHOCYTES NFR BLD AUTO: 14.3 %
MCH RBC QN AUTO: 30.4 PG (ref 26.5–33)
MCHC RBC AUTO-ENTMCNC: 33 G/DL (ref 31.5–36.5)
MCV RBC AUTO: 92 FL (ref 78–100)
MONOCYTES # BLD AUTO: 0.5 10E9/L (ref 0–1.3)
MONOCYTES NFR BLD AUTO: 5.7 %
NEUTROPHILS # BLD AUTO: 6.9 10E9/L (ref 1.6–8.3)
NEUTROPHILS NFR BLD AUTO: 78.7 %
NRBC # BLD AUTO: 0 10*3/UL
NRBC BLD AUTO-RTO: 0 /100
PLATELET # BLD AUTO: 224 10E9/L (ref 150–450)
POTASSIUM SERPL-SCNC: 4.1 MMOL/L (ref 3.4–5.3)
RBC # BLD AUTO: 4.38 10E12/L (ref 3.8–5.2)
SODIUM SERPL-SCNC: 140 MMOL/L (ref 133–144)
WBC # BLD AUTO: 8.7 10E9/L (ref 4–11)

## 2018-08-18 PROCEDURE — 86735 MUMPS ANTIBODY: CPT | Performed by: EMERGENCY MEDICINE

## 2018-08-18 PROCEDURE — 25000125 ZZHC RX 250: Performed by: EMERGENCY MEDICINE

## 2018-08-18 PROCEDURE — 25000128 H RX IP 250 OP 636: Performed by: EMERGENCY MEDICINE

## 2018-08-18 PROCEDURE — 96375 TX/PRO/DX INJ NEW DRUG ADDON: CPT

## 2018-08-18 PROCEDURE — 80048 BASIC METABOLIC PNL TOTAL CA: CPT | Performed by: EMERGENCY MEDICINE

## 2018-08-18 PROCEDURE — 36415 COLL VENOUS BLD VENIPUNCTURE: CPT | Performed by: EMERGENCY MEDICINE

## 2018-08-18 PROCEDURE — 70491 CT SOFT TISSUE NECK W/DYE: CPT

## 2018-08-18 PROCEDURE — 40000957 ZZHCL STATISTIC MUMPS VIRUS PCR: Performed by: EMERGENCY MEDICINE

## 2018-08-18 PROCEDURE — 99285 EMERGENCY DEPT VISIT HI MDM: CPT | Mod: 25

## 2018-08-18 PROCEDURE — 96365 THER/PROPH/DIAG IV INF INIT: CPT | Mod: 59

## 2018-08-18 PROCEDURE — 85025 COMPLETE CBC W/AUTO DIFF WBC: CPT | Performed by: EMERGENCY MEDICINE

## 2018-08-18 PROCEDURE — 84703 CHORIONIC GONADOTROPIN ASSAY: CPT | Performed by: EMERGENCY MEDICINE

## 2018-08-18 RX ORDER — DEXAMETHASONE SODIUM PHOSPHATE 10 MG/ML
10 INJECTION, SOLUTION INTRAMUSCULAR; INTRAVENOUS ONCE
Status: COMPLETED | OUTPATIENT
Start: 2018-08-18 | End: 2018-08-18

## 2018-08-18 RX ORDER — IOPAMIDOL 755 MG/ML
80 INJECTION, SOLUTION INTRAVASCULAR ONCE
Status: COMPLETED | OUTPATIENT
Start: 2018-08-18 | End: 2018-08-18

## 2018-08-18 RX ORDER — AMPICILLIN AND SULBACTAM 2; 1 G/1; G/1
3 INJECTION, POWDER, FOR SOLUTION INTRAMUSCULAR; INTRAVENOUS ONCE
Status: COMPLETED | OUTPATIENT
Start: 2018-08-18 | End: 2018-08-18

## 2018-08-18 RX ADMIN — AMPICILLIN SODIUM AND SULBACTAM SODIUM 3 G: 2; 1 INJECTION, POWDER, FOR SOLUTION INTRAMUSCULAR; INTRAVENOUS at 14:59

## 2018-08-18 RX ADMIN — SODIUM CHLORIDE, PRESERVATIVE FREE 60 ML: 5 INJECTION INTRAVENOUS at 13:28

## 2018-08-18 RX ADMIN — DEXAMETHASONE SODIUM PHOSPHATE 10 MG: 10 INJECTION, SOLUTION INTRAMUSCULAR; INTRAVENOUS at 14:55

## 2018-08-18 RX ADMIN — IOPAMIDOL 80 ML: 755 INJECTION, SOLUTION INTRAVENOUS at 13:28

## 2018-08-18 ASSESSMENT — ENCOUNTER SYMPTOMS
NAUSEA: 0
NECK STIFFNESS: 0
FEVER: 0
DIARRHEA: 0
VOMITING: 0
NECK PAIN: 1

## 2018-08-18 NOTE — DISCHARGE INSTRUCTIONS
"  Salivary Gland Infection  Salivary glands make saliva. Saliva is mostly water. It also has minerals and proteins that help break down food and keep the mouth and teeth healthy. There are 3 pairs of salivary glands:    Parotid glands (in front of the ear)    Submandibular glands (below the jaw)    Sublingual glands (below the tongue)  A salivary gland can become infected by bacteria (germs). Things that make this more likely include dehydration and taking medicines that affect saliva flow. Infection is also more likely when the tube (duct) that carries saliva from the gland to the mouth is blocked. It may be blocked by a salivary gland \"stone.\" This is a collection of minerals that forms in the salivary gland.  Signs of infection include fever, severe pain in the gland, and redness and swelling over the gland. It may hurt to open the mouth. Symptoms may be worse when the flow of saliva is stimulated, such as by the smell of food.   Antibiotics are used to treat the infection. Drainage of the infection with a simple surgery may be needed. If you have a salivary gland stone, a procedure may be done to remove it.  Home Care:    Take antibiotics as directed until they are finished. Do this even if you start to feel better after only a few days.    Unless another medicine was prescribed, take over-the-counter medicines, such as acetaminophen or ibuprofen, to help relieve pain.    Moist heat can also help relieve swelling and pain. Wet a cloth with warm water and put it over the sore gland for 10-15 minutes several times a day.    Gently massage the gland a few times a day.     Suck on lemon or other tart hard candies to cause flow of saliva.  To help prevent stones and infections:    Drink 6-8 glasses of fluid per day (such as water, tea, and clear soup) to keep well hydrated.    If you smoke, ask your healthcare provider for help to quit. Smoking makes salivary gland stones more likely.    Keep good dental hygiene. " Brush and floss your teeth daily. See your dentist for regular cleanings.  Follow-up care  Follow up with your healthcare provider or as advised.   When to seek medical advice  Call your healthcare provider if any of the following occur:    Fever over 100.4 F (38 C) after 2 days of taking antibiotics    Symptoms that get worse or don't get better in a few days    Trouble breathing or swallowing  Date Last Reviewed: 10/1/2017    5024-9251 [employer]. [address] [place] [id number]. All rights reserved. This information is not intended as a substitute for professional medical care. Always follow your healthcare professional's instructions.

## 2018-08-18 NOTE — MR AVS SNAPSHOT
After Visit Summary   8/18/2018    Breanna Fischer    MRN: [id number]           Patient Information     Date Of Birth          1975        Visit Information        Provider Department      8/18/2018 9:35 AM Provider, Ellie Tao MD [facility]        Today's Diagnoses     Mass of neck    -  1       Follow-ups after your visit        Who to contact     If you have questions or need follow up information about today's clinic visit or your schedule please contact [facility] directly at [phone].  Normal or non-critical lab and imaging results will be communicated to you by MyChart, letter or phone within 4 business days after the clinic has received the results. If you do not hear from us within 7 days, please contact the clinic through MyChart or phone. If you have a critical or abnormal lab result, we will notify you by phone as soon as possible.  Submit refill requests through [employer] or call your pharmacy and they will forward the refill request to us. Please allow 3 business days for your refill to be completed.          Additional Information About Your Visit        Care EveryWhere ID     This is your Care EveryWhere ID. This could be used by other organizations to access your [place] medical records  [id number]        Your Vitals Were     Pulse Temperature Respirations BMI (Body Mass Index)          72 97.6  F (36.4  C) (Oral) 20 41.72 kg/m2         Blood Pressure from Last 3 Encounters:   08/18/18 140/90   06/29/18 124/80   12/28/17 130/84    Weight from Last 3 Encounters:   08/18/18 272 lb (123.4 kg)   12/28/17 269 lb 9.6 oz (122.3 kg)   02/05/01 221 lb (100.2 kg)              Today, you had the following     No orders found for display       Primary Care Provider Office Phone # Fax #    EVIN Dickinson [facility] [phone] [address] [facility] [address]        Equal Access to Services     [facility]  GAAR : Hadii aad ku hadabdiaziz Candelario, waaxda luqadaha, qaybta kajeffreyda rachael, ester kentrellin hayaadoc chavarriabrittney maxwell selina . So [facility] [phone].    ATENCIÓN: Si habla español, tiene a esposito disposición servicios gratuitos de asistencia lingüística. Llame al [phone].    We comply with applicable federal civil rights laws and [place] laws. We do not discriminate on the basis of race, color, national origin, age, disability, sex, sexual orientation, or gender identity.            Thank you!     Thank you for choosing [facility]  for your care. Our goal is always to provide you with excellent care. Hearing back from our patients is one way we can continue to improve our services. Please take a few minutes to complete the written survey that you may receive in the mail after your visit with us. Thank you!             Your Updated Medication List - Protect others around you: Learn how to safely use, store and throw away your medicines at www.disposemymeds.org.          This list is accurate as of 8/18/18 10:04 AM.  Always use your most recent med list.                   Brand Name Dispense Instructions for use Diagnosis    Castellani Paint 1.5 % Liqd     29.5 mL    Externally apply topically as needed Paint every evening on the excematous lesions on the legs before adding the topical steroid. As alternative pharmacy could provide Gentian violet    Allergic contact dermatitis due to other agents       CETIRIZINE HCL PO      Take by mouth daily        losartan 25 MG tablet    COZAAR    90 tablet    Take 1 tablet (25 mg) by mouth daily    Benign essential hypertension       mometasone 0.1 % cream    ELOCON    50 g    Apply topically daily    Allergic contact dermatitis due to other agents

## 2018-08-18 NOTE — ED PROVIDER NOTES
"  History     Chief Complaint:  Facial swelling    HPI   Breanna Fischer is a pleasant 43 year old female, who was fully vaccinated, who presents to the emergency department for evaluation of a neck abscess. The patient reports that she noticed a dime sized swelling on her right side of her face last night which has now increased. She denies fever, body aches.  She had gone to urgent care, who sent her to the ER for concerns for an abscess.  She has been able to swallow well.          Allergies:  No Known Allergies     Medications:    Cetirizine  Cozaar  Mometasone    Past Medical History:    Celiac disease  hypertension  Morbid obesity    Past Surgical History:    Cholecystectomy    Family History:    Obesity  Thyroid disease  Obesity    Social History:  The patient  reports that she has never smoked. She has never used smokeless tobacco. She reports that she does not drink alcohol or use illicit drugs.    Marital Status:   [4]     Review of Systems   Constitutional: Negative for fever.   Gastrointestinal: Negative for diarrhea, nausea and vomiting.   Musculoskeletal: Positive for neck pain. Negative for neck stiffness.   All other systems reviewed and are negative.    Physical Exam   First Vitals:  BP: 146/61  Pulse: 97  Heart Rate: 79  Temp: 98.7  F (37.1  C)  Resp: 16  Height: 172.7 cm (5' 8\")  Weight: 122.5 kg (270 lb)  SpO2: 99 %      Physical Exam  General: Appears well-developed and well-nourished.   Head: No signs of trauma. Swelling to right parotid beyond the angle of the mandible.  +tenderness and mild erythema.    Mouth/Throat: Oropharynx is clear and moist. No swelling under tongue with soft tissues.    Eyes: Conjunctivae are normal.   Neck: Normal range of motion. No nuchal rigidity. No cervical adenopathy  CV: Normal rate and regular rhythm.    Resp: Effort normal and breath sounds normal. No respiratory distress.   MSK: Normal range of motion.   Neuro: The patient is alert and oriented.  Speech " normal.  GCS 15  Skin: Skin is warm and dry. See above.  Psych: normal mood and affect. behavior is normal.       Emergency Department Course   Imaging:  Soft tissue neck CT w contrast   Final Result   IMPRESSION:  Right parotitis with adjacent cellulitis.      JAZIEL JAVED MD           Laboratory:  HCG Qualitative Pregnancy Negative  CBC; WNL  BMP: Calcium 8.4L  Mumps antibody IgM (In process)  Mumps Confirmation PCR (In process)    Interventions:  1455: Decadron 10mg IV  1459: Unasyn 3g     Emergency Department Course:  1044 Nursing notes and vitals reviewed.  I performed an exam of the patient as documented above.     IV inserted. Medicine administered as documented above. Blood drawn. This was sent to the lab for further testing, results above.    The patient was sent for a Soft Neck CT while in the emergency department, findings above.     1147: I spoke with [place] [facility]    The patient was met by Dr. Hamlet Worthy here in the ED.     I rechecked the patient and discussed the results of her workup thus far.     Findings and plan explained to the Patient. Patient discharged home with instructions regarding supportive care, medications, and reasons to return. The importance of close follow-up was reviewed    I personally reviewed the laboratory results with the Patient and answered all related questions prior to discharge.   Impression & Plan    Medical Decision Making:  Breanna Fischer is a 43 year old female who presents due to swelling to the right cheek area. This started last night and became noticeably worse today.  She had gone to her clinic today but was sent to the ER.  On my evaluation, her symptoms are consistent with parotitis.  Given the degree of swelling, I did obtain a CT scan which showed parotitis with associated swelling of the soft tissues.  I spoke with ENT, who evaluated patient in the emergency department.  We discussed the options with the patient, the patient ultimately  preferred to go home.  She was given a dose of IV antibiotics and Decadron in the ER and discharged home with Augmentin.  Patient is a nurse practitioner, and was given very clear instructions to return if she has worsening swelling, pain, difficulty swallowing or breathing, any further concerns. I did send off mumps studies and spoke with the Department of Health, although clinically I felt that this is less likely.    Critical Care time:  none    Diagnosis:    ICD-10-CM    1. Parotitis K11.20        Disposition:  discharged to home    Discharge Medications:  New Prescriptions    AMOXICILLIN-CLAVULANATE (AUGMENTIN) 875-125 MG PER TABLET    Take 1 tablet by mouth 2 times daily     Austen HARDIN am serving as a scribe at 10:44 AM on 8/18/2018 to document services personally performed by Hamlet Castelan MD based on my observations and the provider's statements to me.     EMERGENCY DEPARTMENT       Hamlet Castelan MD  08/18/18 0376

## 2018-08-18 NOTE — ED AVS SNAPSHOT
"  Emergency Department    [address] [facility] [address]    Phone:  [phone]    Fax:  [phone]                                       Breanna Fischer   MRN: [id number]    Department:   Emergency Department   Date of Visit:  8/18/2018           Patient Information     Date Of Birth          1975        Your diagnoses for this visit were:     Parotitis        You were seen by Hamlet Castelan MD.      Follow-up Information     Follow up with  Emergency Department.    Specialty:  EMERGENCY MEDICINE    Why:  As needed, If symptoms worsen    Contact information:    [address] [facility] [id number] [phone]        Call Hamlet Worthy MD.    Specialty:  Otolaryngology    Contact information:    [facility]  [address] [address]  [phone]          Discharge Instructions         Salivary Gland Infection  Salivary glands make saliva. Saliva is mostly water. It also has minerals and proteins that help break down food and keep the mouth and teeth healthy. There are 3 pairs of salivary glands:    Parotid glands (in front of the ear)    Submandibular glands (below the jaw)    Sublingual glands (below the tongue)  A salivary gland can become infected by bacteria (germs). Things that make this more likely include dehydration and taking medicines that affect saliva flow. Infection is also more likely when the tube (duct) that carries saliva from the gland to the mouth is blocked. It may be blocked by a salivary gland \"stone.\" This is a collection of minerals that forms in the salivary gland.  Signs of infection include fever, severe pain in the gland, and redness and swelling over the gland. It may hurt to open the mouth. Symptoms may be worse when the flow of saliva is stimulated, such as by the smell of food.   Antibiotics are used to treat the infection. Drainage of the infection with a simple surgery may be needed. If you have a " salivary gland stone, a procedure may be done to remove it.  Home Care:    Take antibiotics as directed until they are finished. Do this even if you start to feel better after only a few days.    Unless another medicine was prescribed, take over-the-counter medicines, such as acetaminophen or ibuprofen, to help relieve pain.    Moist heat can also help relieve swelling and pain. Wet a cloth with warm water and put it over the sore gland for 10-15 minutes several times a day.    Gently massage the gland a few times a day.     Suck on lemon or other tart hard candies to cause flow of saliva.  To help prevent stones and infections:    Drink 6-8 glasses of fluid per day (such as water, tea, and clear soup) to keep well hydrated.    If you smoke, ask your healthcare provider for help to quit. Smoking makes salivary gland stones more likely.    Keep good dental hygiene. Brush and floss your teeth daily. See your dentist for regular cleanings.  Follow-up care  Follow up with your healthcare provider or as advised.   When to seek medical advice  Call your healthcare provider if any of the following occur:    Fever over 100.4 F (38 C) after 2 days of taking antibiotics    Symptoms that get worse or don't get better in a few days    Trouble breathing or swallowing  Date Last Reviewed: 10/1/2017    2106-7550 [employer] [address] [place] [address]. All rights reserved. This information is not intended as a substitute for professional medical care. Always follow your healthcare professional's instructions.          24 Hour Appointment Hotline       To make an appointment at any [facility], call [phone] ([phone]). If you don't have a family doctor or clinic, we will help you find one. [place] clinics are conveniently located to serve the needs of you and your family.             Review of your medicines      START taking        Dose / Directions Last dose taken     amoxicillin-clavulanate 875-125 MG per tablet   Commonly known as:  AUGMENTIN   Dose:  1 tablet   Quantity:  28 tablet        Take 1 tablet by mouth 2 times daily   Refills:  0          Our records show that you are taking the medicines listed below. If these are incorrect, please call your family doctor or clinic.        Dose / Directions Last dose taken    Castellani Paint 1.5 % Liqd   Quantity:  29.5 mL        Externally apply topically as needed Paint every evening on the excematous lesions on the legs before adding the topical steroid. As alternative pharmacy could provide Gentian violet   Refills:  1        CETIRIZINE HCL PO        Take by mouth daily   Refills:  0        losartan 25 MG tablet   Commonly known as:  COZAAR   Dose:  25 mg   Quantity:  90 tablet        Take 1 tablet (25 mg) by mouth daily   Refills:  3        mometasone 0.1 % cream   Commonly known as:  ELOCON   Quantity:  50 g        Apply topically daily   Refills:  3                Prescriptions were sent or printed at these locations (1 Prescription)                   Other Prescriptions                Printed at Department/Unit printer (1 of 1)         amoxicillin-clavulanate (AUGMENTIN) 875-125 MG per tablet                Procedures and tests performed during your visit     Basic metabolic panel    CBC with platelets + differential    HCG QUALitative pregnancy (blood)    Mumps Confirmation PCR    Mumps antibody IgM    Soft tissue neck CT w contrast      Orders Needing Specimen Collection     None      Pending Results     Date and Time Order Name Status Description    8/18/2018 1150 Mumps antibody IgM In process     8/18/2018 1123 Mumps Confirmation PCR In process             Pending Culture Results     Date and Time Order Name Status Description    8/18/2018 1123 Mumps Confirmation PCR In process             Pending Results Instructions     If you had any lab results that were not finalized at the time of your Discharge, you can call the  ED Lab Result RN at [phone]. You will be contacted by this team for any positive Lab results or changes in treatment. The nurses are available 7 days a week from 10A to 6:30P.  You can leave a message 24 hours per day and they will return your call.        Test Results From Your Hospital Stay              8/18/2018 11:26 AM      Component Results     Component Value Ref Range & Units Status    WBC 8.7 4.0 - 11.0 10e9/L Final    RBC Count 4.38 3.8 - 5.2 10e12/L Final    Hemoglobin 13.3 11.7 - 15.7 g/dL Final    Hematocrit 40.3 35.0 - 47.0 % Final    MCV 92 78 - 100 fl Final    MCH 30.4 26.5 - 33.0 pg Final    MCHC 33.0 31.5 - 36.5 g/dL Final    RDW 12.9 10.0 - 15.0 % Final    Platelet Count 224 150 - 450 10e9/L Final    Diff Method Automated Method  Final    % Neutrophils 78.7 % Final    % Lymphocytes 14.3 % Final    % Monocytes 5.7 % Final    % Eosinophils 0.9 % Final    % Basophils 0.2 % Final    % Immature Granulocytes 0.2 % Final    Nucleated RBCs 0 0 /100 Final    Absolute Neutrophil 6.9 1.6 - 8.3 10e9/L Final    Absolute Lymphocytes 1.3 0.8 - 5.3 10e9/L Final    Absolute Monocytes 0.5 0.0 - 1.3 10e9/L Final    Absolute Eosinophils 0.1 0.0 - 0.7 10e9/L Final    Absolute Basophils 0.0 0.0 - 0.2 10e9/L Final    Abs Immature Granulocytes 0.0 0 - 0.4 10e9/L Final    Absolute Nucleated RBC 0.0  Final         8/18/2018 11:48 AM      Component Results     Component Value Ref Range & Units Status    Sodium 140 133 - 144 mmol/L Final    Potassium 4.1 3.4 - 5.3 mmol/L Final    Chloride 108 94 - 109 mmol/L Final    Carbon Dioxide 23 20 - 32 mmol/L Final    Anion Gap 9 3 - 14 mmol/L Final    Glucose 94 70 - 99 mg/dL Final    Urea Nitrogen 13 7 - 30 mg/dL Final    Creatinine 0.59 0.52 - 1.04 mg/dL Final    GFR Estimate >90 >60 mL/min/1.7m2 Final    Non  GFR Calc    GFR Estimate If Black >90 >60 mL/min/1.7m2 Final    African American GFR Calc    Calcium 8.4 (L) 8.5 - 10.1 mg/dL Final                8/18/2018  1:26 PM         8/18/2018 12:19 PM         8/18/2018 12:37 PM      Component Results     Component Value Ref Range & Units Status    HCG Qualitative Serum Negative NEG^Negative Final    This test is for screening purposes.  Results should be interpreted along with   the clinical picture.  Confirmation testing is available if warranted by   ordering XPR188, HCG Quantitative Pregnancy.           8/18/2018  2:01 PM      Narrative     CT SCAN OF THE NECK WITH CONTRAST  8/18/2018 1:44 PM     HISTORY: Right facial or parotid swelling.    TECHNIQUE:  Axial images and coronal reformations. Radiation dose for  this scan was reduced using automated exposure control, adjustment of  the mA and/or kV according to patient size, or iterative  reconstruction technique. 80 mL Isovue-370 IV.      COMPARISON: None.    FINDINGS:   Visualized sinuses, nasopharynx and orbits: Normal.      Oropharynx, tongue and oral cavity:  Normal.      Hypopharynx: Normal.      Larynx and trachea: Normal.      Thyroid: Normal.    Submandibular glands: Normal.      Parotid glands: Right parotid gland is enlarged and hyperemic  consistent with parotitis. No evidence of abscess. Adjacent  subcutaneous soft tissue swelling consistent with cellulitis,  extending down the right platysma down to the level of the thyroid  gland.  No dilatation of Stensen's duct or stone.      Lymph nodes: Normal.      Vasculature: Normal.      Lungs and upper mediastinum: Normal.      Bones: Normal.        Impression     IMPRESSION:  Right parotitis with adjacent cellulitis.    JAZIEL JAVED MD                Clinical Quality Measure: Blood Pressure Screening     Your blood pressure was checked while you were in the emergency department today. The last reading we obtained was  BP: 146/81 . Please read the guidelines below about what these numbers mean and what you should do about them.  If your systolic blood pressure (the top number) is less than 120 and your  diastolic blood pressure (the bottom number) is less than 80, then your blood pressure is normal. There is nothing more that you need to do about it.  If your systolic blood pressure (the top number) is 120-139 or your diastolic blood pressure (the bottom number) is 80-89, your blood pressure may be higher than it should be. You should have your blood pressure rechecked within a year by a primary care provider.  If your systolic blood pressure (the top number) is 140 or greater or your diastolic blood pressure (the bottom number) is 90 or greater, you may have high blood pressure. High blood pressure is treatable, but if left untreated over time it can put you at risk for heart attack, stroke, or kidney failure. You should have your blood pressure rechecked by a primary care provider within the next 4 weeks.  If your provider in the emergency department today gave you specific instructions to follow-up with your doctor or provider even sooner than that, you should follow that instruction and not wait for up to 4 weeks for your follow-up visit.        Thank you for choosing [facility]       Thank you for choosing [facility] for your care. Our goal is always to provide you with excellent care. Hearing back from our patients is one way we can continue to improve our services. Please take a few minutes to complete the written survey that you may receive in the mail after you visit with us. Thank you!        Care EveryWhere ID     This is your Care EveryWhere ID. This could be used by other organizations to access your [facility] medical records  [id number]        Equal Access to Services     RITA GROVE : Hadii priscilla Candelario, waaxda luphiladaha, qaybta kaalmada rachael, ester marks . So [facility] [phone].    ATENCIÓN: Si habla español, tiene a esposito disposición servicios gratuitos de asistencia lingüística. Llame al [phone].    We comply with applicable federal civil rights laws and  [place] laws. We do not discriminate on the basis of race, color, national origin, age, disability, sex, sexual orientation, or gender identity.            After Visit Summary       This is your record. Keep this with you and show to your community pharmacist(s) and doctor(s) at your next visit.

## 2018-08-18 NOTE — ED AVS SNAPSHOT
Emergency Department    [address] [place] [address]    Phone:  [phone]    Fax:  [phone]                                       Breanna Fischer   MRN: [id number]    Department:   Emergency Department   Date of Visit:  8/18/2018           After Visit Summary Signature Page     I have received my discharge instructions, and my questions have been answered. I have discussed any challenges I see with this plan with the nurse or doctor.    ..........................................................................................................................................  Patient/Patient Representative Signature      ..........................................................................................................................................  Patient Representative Print Name and Relationship to Patient    ..................................................               ................................................  Date                                            Time    ..........................................................................................................................................  Reviewed by Signature/Title    ...................................................              ..............................................  Date                                                            Time

## 2018-08-18 NOTE — CONSULTS
Consult Date:  08/18/2018      REASON FOR CONSULTATION:  Right parotid gland infection.      HISTORY OF PRESENT ILLNESS:  43-year-old woman with a 1-day history of right facial swelling becoming more severe last night into today.  It is moderately painful but not severe.  She has no other symptoms other than tightness and discomfort related to this.  She was seen at a clinic elsewhere and referred here to the ER for further evaluation and treatment.  She otherwise has been in good health with no recent illnesses.  No significant medical issues.  While here in the ER, a CBC with differential was drawn showing normal white count with slight left shift on the neutrophils.  She was afebrile.  A CT scan was also done which I reviewed showing inflammation of the parotid without abscess and some soft tissue thickening consistent with possible cellulitis.  No stone seen.        OBJECTIVE:  Examination shows the patient sitting in a chair, alert, oriented, normal respirations.  Cranial nerves intact.  Obvious swelling in the right tail of parotid region.  It was quite extensive behind the ear and into the upper neck.  No trismus.  Good dentition.  Mouth, oropharynx normal.  Unable to express any purulent fluid from parotid duct.  No stone palpable within the duct on bimanual palpation.  The area is firm.  Mild redness and mild to moderately tender.  No palpable nodes.  Left side normal.  External ear auditory canal, TMs normal.      IMPRESSION:  Right parotid gland infection, probably bacterial with rapid onset.      PLAN:  I discussed this with the emergency room physician.  While there, she received 10 of Decadron IV and 3 grams of Unasyn intravenously with discharge medication of Augmentin 875 b.i.d. for 10 days.  I instructed her to return to medical attention promptly if this becomes worse in terms of swelling, pain, or any airway complaints.  I discussed risk for abscess formation.  I also asked her to follow up with  ENT if not showing quick resolution in 2-3 days.         TAMIE ROWLAND MD             D: 2018   T: 2018   MT: NTS      Name:     KIRK MARQUES   MRN:      [id number]        Account:       [id number]   :      1975           Consult Date:  2018      Document: [id number]       cc: Lindsey CLEARY CNP

## 2018-08-18 NOTE — PROGRESS NOTES
Breanna Fischer is a 43 year old female who presents today with the sudden onset of right sided neck swelling.  Onset was yesterday morning, area seemed to be about a dime or so in diameter.  It had increased in size by the end of the day to a few centimeters in diameter.  This morning she awoke with increased pain and swelling that is now encroaching on her ability to swallow.    Patient has been referred to ED for further evaluation, may benefit from a scan to fully evaluate the indurated mass that is now well over 10 centimeters in length along the right side of her neck up to her jaw line.      Sh: patient works at [employer], will go to [employer] [facility] today.

## 2018-08-20 ENCOUNTER — TELEPHONE (OUTPATIENT)
Dept: EMERGENCY MEDICINE | Facility: CLINIC | Age: 43
End: 2018-08-20

## 2018-08-20 NOTE — ED NOTES
Attempted to call patient to report the negative mumps test from the department of health.  Patient did not answer.  Left a message requesting she call back to inform her of the negative result.       Hamlet Castelan MD  08/20/18 4813

## 2018-08-20 NOTE — ED NOTES
Chart accessed due to pt calling and asking for mumps test results. Results not back yet and called pt's number to tell her that but got no answer. Did not leave a voice mail.

## 2018-08-20 NOTE — TELEPHONE ENCOUNTER
[facility] Emergency Department Lab result notification:    Reason for call  Breanna called in at 5:25P to request Mumps results    Lab result  Mumps antibody IgM are pending (results reported in 1-6 days)  Mumps confirmation PCR are pending (Sent to MD)    Breanna notified of results    Royer Higginbotham RN  [facility] RN  Lung Nodule and ED Lab Result RN  Epic pool (ED late result f/u RN): [id number]  [facility] INCIDENTAL RADIOLOGY F/U NURSES: P [id number]  # [phone]

## 2018-08-20 NOTE — ED NOTES
Called pt again and spoke directly with her letting her know the mumps test results were not back yet.

## 2018-08-21 LAB
MUMPS CONFIRMATION PCR: NORMAL
MUMPS SPECIMEN TYPE: NORMAL
MUV IGM SER IA-ACNC: 0.48 IV

## 2018-08-21 NOTE — TELEPHONE ENCOUNTER
Breanna notified of Mumps Antibody IgM result (Negative)  She has not further questions.    Royer Higginbotham, RN  [facility] RN  Lung Nodule and ED Lab Result RN  Epic pool (ED late result f/u RN): [id number]  [facility] INCIDENTAL RADIOLOGY F/U NURSES: P [id number]  # [phone]

## 2018-08-21 NOTE — TELEPHONE ENCOUNTER
[facility] Emergency Department Lab result notification:    Reason for call  Notify of lab results per Patient request.    Lab result  Mumps antibody IgM is negative  Left voicemail message requesting a call back to [phone] between 10 a.m. and 6:30 p.m. for patient's ED/UC lab results.      Royer Higginbotham, RN  [facility] RN  Lung Nodule and ED Lab Result RN  Epic pool (ED late result f/u RN): [id number]  [facility] INCIDENTAL RADIOLOGY F/U NURSES: P [id number]  # [phone]

## 2018-08-21 NOTE — TELEPHONE ENCOUNTER
Notified of negative result for the Mumps confirmation PCR  Awaiting Mumps Antibody IgM result    Royer Higginbotham RN  [facility] RN  Lung Nodule and ED Lab Result RN  Epic pool (ED late result f/u RN): [id number]  [facility] INCIDENTAL RADIOLOGY F/U NURSES: P [id number]  # [phone]

## 2018-12-14 ENCOUNTER — OFFICE VISIT (OUTPATIENT)
Dept: ENDOCRINOLOGY | Facility: CLINIC | Age: 43
End: 2018-12-14
Payer: COMMERCIAL

## 2018-12-14 ENCOUNTER — ALLIED HEALTH/NURSE VISIT (OUTPATIENT)
Dept: SURGERY | Facility: CLINIC | Age: 43
End: 2018-12-14
Payer: COMMERCIAL

## 2018-12-14 VITALS
HEART RATE: 68 BPM | TEMPERATURE: 97.3 F | RESPIRATION RATE: 18 BRPM | SYSTOLIC BLOOD PRESSURE: 133 MMHG | WEIGHT: 273.4 LBS | DIASTOLIC BLOOD PRESSURE: 79 MMHG | BODY MASS INDEX: 42.91 KG/M2 | OXYGEN SATURATION: 97 % | HEIGHT: 67 IN

## 2018-12-14 DIAGNOSIS — R53.83 FATIGUE, UNSPECIFIED TYPE: ICD-10-CM

## 2018-12-14 DIAGNOSIS — R06.83 SNORING: ICD-10-CM

## 2018-12-14 DIAGNOSIS — E66.01 MORBID OBESITY (H): Primary | ICD-10-CM

## 2018-12-14 RX ORDER — NALTREXONE HYDROCHLORIDE 50 MG/1
TABLET, FILM COATED ORAL
Qty: 30 TABLET | Refills: 3 | Status: SHIPPED | OUTPATIENT
Start: 2018-12-14 | End: 2021-11-26

## 2018-12-14 ASSESSMENT — MIFFLIN-ST. JEOR: SCORE: 1921.72

## 2018-12-14 ASSESSMENT — PAIN SCALES - GENERAL: PAINLEVEL: NO PAIN (0)

## 2018-12-14 NOTE — NURSING NOTE
"Chief Complaint   Patient presents with     Weight Problem     Pt here for consult for weight management        Vitals:    12/14/18 0819   BP: 133/79   BP Location: Left arm   Patient Position: Sitting   Cuff Size: Adult Large   Pulse: 68   Resp: 18   SpO2: 97%   Weight: 124 kg (273 lb 6.4 oz)   Height: 1.708 m (5' 7.25\")       Body mass index is 42.5 kg/m .      ABHAY Moreno, EMT                      "

## 2018-12-14 NOTE — LETTER
"2018       RE: Breanna Fischer  [address] [place] [address]     Dear Colleague,    Thank you for referring your patient, Breanna Fischer, to the [facility] MEDICAL WEIGHT MANAGEMENT at [facility]. Please see a copy of my visit note below.        New Medical Weight Management Consult    PATIENT:  Breanna Fischer  MRN:         [id number]  :         1975  IVORY:         2018    Dear Colleagues,    I had the pleasure of seeing your patient, Breanna Fischer.  Full intake/assessment done to determine barriers to weight loss success and develop a treatment plan.  Breanna Fischer is a 43 year old female interested in treatment of medical problems associated with weight.  Her weight today is 273 lbs 6.4 oz, Body mass index is 42.5 kg/m ., and she has the following co-morbidities:     2018   I have the following co-morbidities associated with obesity: High Blood Pressure, Lower Extremity Edema, Weight Bearing Joint Pain, Stress Incontinence       Patient Goals Reviewed With Patient 2018   I am interested in attaining a healthier weight to diminish current health problems related to co-morbid conditions: Yes   I am interested in attaining a healthier weight in order to prevent future health problems: Yes       Referring Provider 2018   Please name the provider who referred you to Medical Weight Management.  If you do not know, please answer: \"I Don't Know\". pcp       Wt Readings from Last 4 Encounters:   18 124 kg (273 lb 6.4 oz)   18 122.5 kg (270 lb)   18 123.4 kg (272 lb)   17 122.3 kg (269 lb 9.6 oz)       Weight History Reviewed With Patient 2018   How concerned are you about your weight? Very Concerned   Would you describe your weight gain as gradual? Yes   I became overweight: After Pregnancy   The following factors have contributed to my weight gain:  A Health Crisis/Stress, Eating Too Much, Lack of Exercise, Genetic (Runs in the " Family)   I have tried the following methods to lose weight: Watching Portions or Calories, Atkins-type Diet (Low Carb/High Protein), Medications   I have the following family history of obesity/being overweight:  My mother is overwieght, My father is overweight, One or more of my siblings are overweight, Many of my relatives are overweight   Has anyone in your family had weight loss surgery? Yes       Diet Recall Reviewed With Patient 12/14/2018   How many glasses of juice do you drink in a typical day? 0   How many of glasses of milk do you drink in a typical day? 0   How many 8oz glasses of sugar containing drinks such as Nick-Aid/sweet tea do you drink in a day? 0   How many cans/bottles of sugar pop/soda/tea/sports drinks do you drink in a day? 0   How many cans/bottles of diet pop/soda/tea or sports drink do you drink in a day? 0   How often do you have a drink of alcohol? Never       Eating Habits Reviewed With Patient 12/14/2018   Generally, my meals include foods like these: bread, pasta, rice, potatoes, corn, crackers, sweet dessert, pop, or juice. Half of the Week   Generally, my meals include foods like these: fried meats, brats, burgers, french fries, pizza, cheese, chips, or ice cream. A Few Times a Week   Eat fast food (like McDonalds, BurLÃ¡nzanos Duc, Taco Bell). A Few Times a Week   Eat at a buffet or sit-down restaurant. Never   Eat most of my meals in front of the TV or computer. Half of the Week   Often skip meals, eat at random times, have no regular eating times. A Few Times a Week   Rarely sit down for a meal but snack or graze throughout.  A Few Times a Week   Eat extra snacks between meals. Half of the Week   Eat most of my food at the end of the day. Never   Eat in the middle of the night or wake up at night to eat. Never   Eat extra snacks to prevent or correct low blood sugar. Never   Eat to prevent acid reflux or stomach pain. Never   Worry about not having enough food to eat. Never   Have  you been to the food shelf at least a few times this year? No   I eat when I am depressed, stressed, anxious, or bored. Half of the Week   I eat when I am happy or as a reward. A Few Times a Week   I feel hungry all the time even if I just have eaten. Never   Feeling full is important to me. A Few Times a Week   Once I start eating, it is hard to stop. A Few Times a Week   I finish all the food on my plate even if I am already full. A Few Times a Week   I can't resist eating delicious food or walk past the good food/smell. A Few Times a Week   I eat/snack without noticing that I am eating. A Few Times a Week   I eat when I am preparing the meal. A Few Times a Week   I eat more than usual when I see others eating. Half of the Week   I have trouble not eating sweets, ice cream, cookies, or chips if they are around the house. Almost Everyday   I think about food all day. A Few Times a Week   What foods, if any, do you crave? Sweets/Candy/Chocolate   I feel out of control when eating. Never   I eat a large amount of food, like a loaf of bread, a box of cookies, a pint/quart of ice cream, all at once. Never   I eat a large amount of food even when I am not hungry. Never   I eat rapidly. Everyday   I eat alone because I feel embarrassed and do not want others to see how much I have eaten. Never   I eat until I am uncomfortably full. Never   I feel bad, disgusted, or guilty after I overeat. Never   I make myself vomit what I have eaten or use laxatives to get rid of food. Never       Activity/Exercise History Reviewed With Patient 12/14/2018   How much of a typical 12 hour day do you spend sitting? Most of the Day   How much of a typical 12 hour day do you spend lying down? Less Than Half the Day   How much of a typical day do you spend walking/standing? Less Than Half the Day   How many hours (not including work) do you spend on the TV/Video Games/Computer/Tablet/Phone? 1 Hour or Less   How many times a week are you  active for the purpose of exercise? 2-3 Times a Week   How many total minutes do you spend doing some activity for the purpose of exercising when you exercise? 15-30 Minutes   What keeps you from being more active? Other       PAST MEDICAL HISTORY:  Past Medical History:   Diagnosis Date     Celiac disease      Hypertension        Work/Social History Reviewed With Patient 12/14/2018   My employment status is: Full-Time   My job is: psych np   How much of your job is spent on the computer or phone? 100%   What is your marital status? Single   Do you have children? Yes   If you have children, are they overweight? Yes       Mental Health History Reviewed With Patient 12/14/2018   Have you ever been physically or sexually abused? No   How often in the past 2 weeks have you felt little interest or pleasure in doing things? Not at all   Over the past 2 weeks how often have you felt down, depressed, or hopeless? Not at all       Sleep History Reviewed With Patient 12/14/2018   How many hours do you sleep at night? 8   Do you think that you snore loudly or has anybody ever heard you snore loudly (louder than talking or so loud it can be heard behind a shut door)? Yes   Has anyone seen or heard you stop breathing during your sleep? No   Do you often feel tired, fatigued, or sleepy during the day? No       MEDICATIONS:   Current Outpatient Medications   Medication Sig Dispense Refill     Castellani Paint 1.5 % LIQD Externally apply topically as needed Paint every evening on the excematous lesions on the legs before adding the topical steroid. As alternative pharmacy could provide Gentian violet 29.5 mL 1     CETIRIZINE HCL PO Take by mouth daily       losartan (COZAAR) 25 MG tablet Take 1 tablet (25 mg) by mouth daily 90 tablet 3     mometasone (ELOCON) 0.1 % cream Apply topically daily 50 g 3     amoxicillin-clavulanate (AUGMENTIN) 875-125 MG per tablet Take 1 tablet by mouth 2 times daily (Patient not taking: Reported on  "12/14/2018) 28 tablet 0       ALLERGIES:   Allergies   Allergen Reactions     Tea Tree Oil        PHYSICAL EXAM:  /79 (BP Location: Left arm, Patient Position: Sitting, Cuff Size: Adult Large)   Pulse 68   Temp 97.3  F (36.3  C) (Oral)   Resp 18   Ht 1.708 m (5' 7.25\")   Wt 124 kg (273 lb 6.4 oz)   SpO2 97%   BMI 42.50 kg/m      A & O x 3  HEENT: NCAT, mucous membranes moist  Respirations unlabored  Location of obesity: Mixed Obesity    ASSESSMENT:  Breanna is a patient with mature onset morbid obesity with significant element of familial/genetic influence and with current health consequences. She does need aggressive weight loss plan due to BMI>40 and co-morbid conditions. Breanna Fischer eats a high carb diet, eats a high fat diet, eats fast food once or more per week, uses food as a reward, uses food as mood management, eats to obtain specific degree of fullness, eats most meals in front of TV, has a disorganized meal pattern, has significant carbohydrate cravings, a sedentary job, celiac disease, vitamin D deficiency, and strong family history (mom and sister had bariatric surgery). She is interested in aggressive weight mgmt strategies including bariatric surgery.    Her problem is complicated by strong craving/reward pathways, poor lifestyle choices and above.    Her ability to lose weight is impacted by current work life, lack of education on nutrition and dietary needs and lack of physical activity.    She is strongly motivated to work on her weight to set a good example for her son.    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    PLAN:  Aggressive  Ancillary testing:  Sleep Study.Sleep medicine referral for sleep study given snoring, fatigue, BMI>40    Food Plan:  See below.   Activity Plan:  Referral for [facility] assessment at [facility]. Her legs hurt if she sits too " long and there maybe unknown exercise limitations getting started.  Supplementary: Monthly meetings with RD.  Medication:  The patient will begin medication in pursuit of improved medical status as influenced by body weight. She will start naltrexone. Patient was made aware that naltrexone is not approved for the treatment of obesity.  There is a mutual understanding of the goals and risks of this therapy. The patient is in agreement. She is educated on dosage regimen and possible side effects.     Patient Instructions:    Start naltrexone - take 1 tablet daily about 2 hours prior to worst food cravings    Vitamin D 2,000 international unit(s) daily, check level in 3 months    Walk every 2 minutes every 30 minutes    1,400 calorie meal plan to lose 1 lbs weekly without exercise    Use meal replacements such as Breanna's meals, Lean Cuisines, Healthy Choice, Smart Ones, Weight Watchers Meals, and Slim Fast and Glucerna shakes and supplement with fresh fruits and vegetables  Please drink a lot of water daily. Most people typically need about 2 liters of water daily and more if they are exercising, have a large weight, or have a fever or illness. Add Crystal Light for flavoring if desired. But no pop with calories in it.  Please keep a food journal of what you eat, calories in what you eat, and mood and bring the journal with you to your next appointment.  Consider using applications for smart phones such as Cytox, travelmob, TapEngage, Yooneed.comIt, Tap&Track, and RelaxM, Calorie Duc.com  Focus on wet volumetrics, meaning, eat more foods that are high in water and fiber such as fruits and vegetables in order to get that full feeling. These are also good for your overall health as well.  Check out Dr. Liv Mcdonald from [facility] - she has cookbooks with low calorie volumetric recipes  You can try Let's Dish to help you prepare meals for you and your family. Often times, the caloric and nutrition data and serving  sizes are available for this food. This can be a time saving maneuver. The website can give you more information http://www.[employer].virtual tweens ltd/  [employer] has Let's Dish & fresh low calorie salads  Check out Hello Fresh at https://www.[employer] [facility].virtual tweens ltd/food-boxes/classic-box/  Try Cooking Light recipes for low calorie meal preparation and planning  Other food plan options you can search for on the internet and check out include: Dea DALLAS, [facility]  Please consider health psychologist to discuss how depression and/or anxiety impact your eating.  Progressively increase physical activity to 60 minutes, including combination of cardio & resistance training x 5 days weekly.  Plan to go to the [facility] to get an exercise assessment and recommendation. You can either plan to complete the entire program there or take your new skills to the gym of your choice. If you do not hear from an exercise professional from the program within a week, please call our clinic nurse at [phone].  Consider hiring a  to develop a structured progressive workout plan that includes both cardio and resistance training. Obesity is a disease according to the IRS, so you may be able to use some pre-tax dollars from your medical flexible spending account if you get a letter from your doctor and/or fill out a plan-specific form.    RTC:    12 weeks with Endocrinologist, 4 weeks with advanced practice provider, monthly meetings with RD    TIME: 45 min spent on evaluation, management, counseling, education, & motivational interviewing with greater than 50% of the total time was spent on counseling and coordinating care      Scarlet Bolaños MD

## 2018-12-14 NOTE — PROGRESS NOTES
"Breanna Fischer is a 43 year old female presents today for new weight management nutrition consultation. Referred by Dr. Bolaños 12/14/18.    Admit Height: 5' 7\"    Admit Weight: 273 lbs    Admit BMI: 42.59 kg/m2      Nutrition history  See MD note for details.    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 Pt has celiac's disease, therefore does a lot of her own cooking at home.     Medications: starting Naltrexone with Dr. Bolaños today    Recent Diet Recall:  B - eggs w/sometimes GF toast OR piece of chicken w/joyner (1Tbsp)  L - ground turkey with vegetables  D - Pork stew  Snacks - apple, oranges, banana, chocolate  Beverages - water (~60 oz) + 1x/week 1 large hot chocolate from [employer]  Alcohol - None  Eating Out - maybe 1x/week - ethnic foods    Exercise - None at this time r/t busy work schedule limits time and pt tired when getting home from work.    Nutrition Prescription  1400 calories/day (per MD)    Nutrition Diagnosis  Food and nutrition related knowledge deficit r/t lack of prior exposure to calorie counting and nutrition education aeb pt unable to verbalize understanding of 1400 calorie/day diet for weight loss.    Nutrition Intervention  Materials/education provided on 1400 calorie/day diet with portion control and healthy food choices (plate method and sources of protein handout), 100 calorie snack choices, meal and snack planning and websites, sample meal plans. Also discussed mindful eating and encouraged pt to not read while eating or eat while driving to work. Showed pt how to use calorieking.com to track calories and looked up a few food items with patient to assess current calorie intake. Encouraged pt to include more activity in her day such as taking the stairs at work and parking car further away in parking lots to increase walking time. Encouraged pt to track calorie intake on either log provided pt writer or own journal. Discussed chocolate cravings and how to use mindful eating " "techniques for handling these cravings. Provided pt with list of goals and RD contact information.     Patient Understanding: Good  Expected Compliance: Good  Follow-Up Plans: 1 month      Nutrition Goals  1) Pt to follow 1400 calorie/day diet  2) When out to eat, ask  to box 1/2 of meal when ordering  3) Use calorieking.com to track calories  4) Start GF multivitamin with iron  5) Eat slowly (20-30 minutes per meal), chewing foods well (25 chews per bite/applesauce consistency)  6) 9\" Plate method (1/2 plate non-starchy vegetables/fruit, 1/4 plate lean protein, 1/4 plate whole grain starch - no more than 1/2 cup carb/meal)  7) Practice mindful eating    - No reading while eating   - Do not eat breakfast while driving      Follow-Up:  1 month or PRN    Time spent with patient: 30 minutes.  Rita Abraham MS, RD, LD  "

## 2018-12-14 NOTE — PATIENT INSTRUCTIONS
Start naltrexone - take 1 tablet daily about 2 hours prior to worst food cravings    Vitamin D 2,000 international unit(s) daily, check level in 3 months    Walk every 2 minutes every 30 minutes    1,400 calorie meal plan to lose 1 lbs weekly without exercise    Use meal replacements such as Breanna's meals, Lean Cuisines, Healthy Choice, Smart Ones, Weight Watchers Meals, and Slim Fast and Glucerna shakes and supplement with fresh fruits and vegetables  Please drink a lot of water daily. Most people typically need about 2 liters of water daily and more if they are exercising, have a large weight, or have a fever or illness. Add Crystal Light for flavoring if desired. But no pop with calories in it.  Please keep a food journal of what you eat, calories in what you eat, and mood and bring the journal with you to your next appointment.  Consider using applications for smart phones such as [employer], FX Aligned, Retention Science, PixelTalentsIt, Tap&Track, and RelaxOVIVO [employer] Communications, Calorie Duc.com  Focus on wet volumetrics, meaning, eat more foods that are high in water and fiber such as fruits and vegetables in order to get that full feeling. These are also good for your overall health as well.  Check out Dr. Liv Mcdonald from [facility] - she has cookbooks with low calorie volumetric recipes  You can try Let's Dish to help you prepare meals for you and your family. Often times, the caloric and nutrition data and serving sizes are available for this food. This can be a time saving maneuver. The website can give you more information http://www.[employer]/  Cob[employer] has Let's Dish & fresh low calorie salads  Check out Hello Fresh at https://www.[facility]/food-boxes/classic-box/  Try Cooking Light recipes for low calorie meal preparation and planning  Other food plan options you can search for on the internet and check out include: Dea DALLAS, [facility]  Please consider health psychologist to discuss how depression and/or  anxiety impact your eating.  Progressively increase physical activity to 60 minutes, including combination of cardio & resistance training x 5 days weekly.  Plan to go to the [facility] to get an exercise assessment and recommendation. You can either plan to complete the entire program there or take your new skills to the gym of your choice. If you do not hear from an exercise professional from the program within a week, please call our clinic nurse at [phone].  Consider hiring a  to develop a structured progressive workout plan that includes both cardio and resistance training. Obesity is a disease according to the IRS, so you may be able to use some pre-tax dollars from your medical flexible spending account if you get a letter from your doctor and/or fill out a plan-specific form.

## 2018-12-14 NOTE — PATIENT INSTRUCTIONS
"Nutrition Goals  1) Pt to follow 1400 calorie/day diet  2) When out to eat, ask  to box 1/2 of meal when ordering  3) Use calorieking.com to track calories  4) Start GF multivitamin with iron  5) Eat slowly (20-30 minutes per meal), chewing foods well (25 chews per bite/applesauce consistency)  6) 9\" Plate method (1/2 plate non-starchy vegetables/fruit, 1/4 plate lean protein, 1/4 plate whole grain starch - no more than 1/2 cup carb/meal)  7) Practice mindful eating    - No reading while eating   - Do not eat breakfast while driving    Follow up with RD in one month    Rita Abraham MS, RD, LD  If you need to schedule or reschedule with a dietitian please call [phone].  "

## 2018-12-14 NOTE — PROGRESS NOTES
"    New Medical Weight Management Consult    PATIENT:  Breanna Fischer  MRN:         [id number]  :         1975  IVORY:         2018    Dear Colleagues,    I had the pleasure of seeing your patient, Breanna Fischer.  Full intake/assessment done to determine barriers to weight loss success and develop a treatment plan.  Breanna Fischer is a 43 year old female interested in treatment of medical problems associated with weight.  Her weight today is 273 lbs 6.4 oz, Body mass index is 42.5 kg/m ., and she has the following co-morbidities:     2018   I have the following co-morbidities associated with obesity: High Blood Pressure, Lower Extremity Edema, Weight Bearing Joint Pain, Stress Incontinence       Patient Goals Reviewed With Patient 2018   I am interested in attaining a healthier weight to diminish current health problems related to co-morbid conditions: Yes   I am interested in attaining a healthier weight in order to prevent future health problems: Yes       Referring Provider 2018   Please name the provider who referred you to Medical Weight Management.  If you do not know, please answer: \"I Don't Know\". pcp       Wt Readings from Last 4 Encounters:   18 124 kg (273 lb 6.4 oz)   18 122.5 kg (270 lb)   18 123.4 kg (272 lb)   17 122.3 kg (269 lb 9.6 oz)       Weight History Reviewed With Patient 2018   How concerned are you about your weight? Very Concerned   Would you describe your weight gain as gradual? Yes   I became overweight: After Pregnancy   The following factors have contributed to my weight gain:  A Health Crisis/Stress, Eating Too Much, Lack of Exercise, Genetic (Runs in the Family)   I have tried the following methods to lose weight: Watching Portions or Calories, Atkins-type Diet (Low Carb/High Protein), Medications   I have the following family history of obesity/being overweight:  My mother is overwieght, My father is overweight, One or more of my " siblings are overweight, Many of my relatives are overweight   Has anyone in your family had weight loss surgery? Yes       Diet Recall Reviewed With Patient 12/14/2018   How many glasses of juice do you drink in a typical day? 0   How many of glasses of milk do you drink in a typical day? 0   How many 8oz glasses of sugar containing drinks such as Nick-Aid/sweet tea do you drink in a day? 0   How many cans/bottles of sugar pop/soda/tea/sports drinks do you drink in a day? 0   How many cans/bottles of diet pop/soda/tea or sports drink do you drink in a day? 0   How often do you have a drink of alcohol? Never       Eating Habits Reviewed With Patient 12/14/2018   Generally, my meals include foods like these: bread, pasta, rice, potatoes, corn, crackers, sweet dessert, pop, or juice. Half of the Week   Generally, my meals include foods like these: fried meats, brats, burgers, french fries, pizza, cheese, chips, or ice cream. A Few Times a Week   Eat fast food (like McDonalds, [employer]Synapsify, Taco Bell). A Few Times a Week   Eat at a buffet or sit-down restaurant. Never   Eat most of my meals in front of the TV or computer. Half of the Week   Often skip meals, eat at random times, have no regular eating times. A Few Times a Week   Rarely sit down for a meal but snack or graze throughout.  A Few Times a Week   Eat extra snacks between meals. Half of the Week   Eat most of my food at the end of the day. Never   Eat in the middle of the night or wake up at night to eat. Never   Eat extra snacks to prevent or correct low blood sugar. Never   Eat to prevent acid reflux or stomach pain. Never   Worry about not having enough food to eat. Never   Have you been to the food shelf at least a few times this year? No   I eat when I am depressed, stressed, anxious, or bored. Half of the Week   I eat when I am happy or as a reward. A Few Times a Week   I feel hungry all the time even if I just have eaten. Never   Feeling full is  important to me. A Few Times a Week   Once I start eating, it is hard to stop. A Few Times a Week   I finish all the food on my plate even if I am already full. A Few Times a Week   I can't resist eating delicious food or walk past the good food/smell. A Few Times a Week   I eat/snack without noticing that I am eating. A Few Times a Week   I eat when I am preparing the meal. A Few Times a Week   I eat more than usual when I see others eating. Half of the Week   I have trouble not eating sweets, ice cream, cookies, or chips if they are around the house. Almost Everyday   I think about food all day. A Few Times a Week   What foods, if any, do you crave? Sweets/Candy/Chocolate   I feel out of control when eating. Never   I eat a large amount of food, like a loaf of bread, a box of cookies, a pint/quart of ice cream, all at once. Never   I eat a large amount of food even when I am not hungry. Never   I eat rapidly. Everyday   I eat alone because I feel embarrassed and do not want others to see how much I have eaten. Never   I eat until I am uncomfortably full. Never   I feel bad, disgusted, or guilty after I overeat. Never   I make myself vomit what I have eaten or use laxatives to get rid of food. Never       Activity/Exercise History Reviewed With Patient 12/14/2018   How much of a typical 12 hour day do you spend sitting? Most of the Day   How much of a typical 12 hour day do you spend lying down? Less Than Half the Day   How much of a typical day do you spend walking/standing? Less Than Half the Day   How many hours (not including work) do you spend on the TV/Video Games/Computer/Tablet/Phone? 1 Hour or Less   How many times a week are you active for the purpose of exercise? 2-3 Times a Week   How many total minutes do you spend doing some activity for the purpose of exercising when you exercise? 15-30 Minutes   What keeps you from being more active? Other       PAST MEDICAL HISTORY:  Past Medical History:    Diagnosis Date     Celiac disease      Hypertension        Work/Social History Reviewed With Patient 12/14/2018   My employment status is: Full-Time   My job is: psych [employer]   How much of your job is spent on the computer or phone? 100%   What is your marital status? Single   Do you have children? Yes   If you have children, are they overweight? Yes       Mental Health History Reviewed With Patient 12/14/2018   Have you ever been physically or sexually abused? No   How often in the past 2 weeks have you felt little interest or pleasure in doing things? Not at all   Over the past 2 weeks how often have you felt down, depressed, or hopeless? Not at all       Sleep History Reviewed With Patient 12/14/2018   How many hours do you sleep at night? 8   Do you think that you snore loudly or has anybody ever heard you snore loudly (louder than talking or so loud it can be heard behind a shut door)? Yes   Has anyone seen or heard you stop breathing during your sleep? No   Do you often feel tired, fatigued, or sleepy during the day? No       MEDICATIONS:   Current Outpatient Medications   Medication Sig Dispense Refill     Castellani Paint 1.5 % LIQD Externally apply topically as needed Paint every evening on the excematous lesions on the legs before adding the topical steroid. As alternative pharmacy could provide Gentian violet 29.5 mL 1     CETIRIZINE HCL PO Take by mouth daily       losartan (COZAAR) 25 MG tablet Take 1 tablet (25 mg) by mouth daily 90 tablet 3     mometasone (ELOCON) 0.1 % cream Apply topically daily 50 g 3     amoxicillin-clavulanate (AUGMENTIN) 875-125 MG per tablet Take 1 tablet by mouth 2 times daily (Patient not taking: Reported on 12/14/2018) 28 tablet 0       ALLERGIES:   Allergies   Allergen Reactions     Tea Tree Oil        PHYSICAL EXAM:  /79 (BP Location: Left arm, Patient Position: Sitting, Cuff Size: Adult Large)   Pulse 68   Temp 97.3  F (36.3  C) (Oral)   Resp 18   Ht 1.708 m (5'  "7.25\")   Wt 124 kg (273 lb 6.4 oz)   SpO2 97%   BMI 42.50 kg/m     A & O x 3  HEENT: NCAT, mucous membranes moist  Respirations unlabored  Location of obesity: Mixed Obesity    ASSESSMENT:  Breanna is a patient with mature onset morbid obesity with significant element of familial/genetic influence and with current health consequences. She does need aggressive weight loss plan due to BMI>40 and co-morbid conditions. Breanna Fischer eats a high carb diet, eats a high fat diet, eats fast food once or more per week, uses food as a reward, uses food as mood management, eats to obtain specific degree of fullness, eats most meals in front of TV, has a disorganized meal pattern, has significant carbohydrate cravings, a sedentary job, celiac disease, vitamin D deficiency, and strong family history (mom and sister had bariatric surgery). She is interested in aggressive weight mgmt strategies including bariatric surgery.    Her problem is complicated by strong craving/reward pathways, poor lifestyle choices and above.    Her ability to lose weight is impacted by current work life, lack of education on nutrition and dietary needs and lack of physical activity.    She is strongly motivated to work on her weight to set a good example for her son.    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    PLAN:  Aggressive  Ancillary testing:  Sleep Study.Sleep medicine referral for sleep study given snoring, fatigue, BMI>40    Food Plan:  See below.   Activity Plan:  Referral for exercise assessment at [facility]. Her legs hurt if she sits too long and there maybe unknown exercise limitations getting started.  Supplementary: Monthly meetings with RD.  Medication:  The patient will begin medication in pursuit of improved medical status as influenced by body weight. She will start naltrexone. Patient was made aware " that naltrexone is not approved for the treatment of obesity.  There is a mutual understanding of the goals and risks of this therapy. The patient is in agreement. She is educated on dosage regimen and possible side effects.     Patient Instructions:    Start naltrexone - take 1 tablet daily about 2 hours prior to worst food cravings    Vitamin D 2,000 international unit(s) daily, check level in 3 months    Walk every 2 minutes every 30 minutes    1,400 calorie meal plan to lose 1 lbs weekly without exercise    Use meal replacements such as Breanna's meals, Lean Cuisines, Healthy Choice, Smart Ones, Weight Watchers Meals, and Slim Fast and Glucerna shakes and supplement with fresh fruits and vegetables  Please drink a lot of water daily. Most people typically need about 2 liters of water daily and more if they are exercising, have a large weight, or have a fever or illness. Add Crystal Light for flavoring if desired. But no pop with calories in it.  Please keep a food journal of what you eat, calories in what you eat, and mood and bring the journal with you to your next appointment.  Consider using applications for smart phones such as [employer], [employer], Taplister, LoseIt, Tap&Track, and RelaxM, Calorie Duc.com  Focus on wet volumetrics, meaning, eat more foods that are high in water and fiber such as fruits and vegetables in order to get that full feeling. These are also good for your overall health as well.  Check out Dr. Liv Mcdonald from [facility] - she has cookbooks with low calorie volumetric recipes  You can try Let's Dish to help you prepare meals for you and your family. Often times, the caloric and nutrition data and serving sizes are available for this food. This can be a time saving maneuver. The website can give you more information http://www.[employer].Tattva/  [employer] has Let's Dish & fresh low calorie salads  Check out Hello Fresh at  https://www.tutoria GmbH/food-boxes/classic-box/  Try Cooking Light recipes for low calorie meal preparation and planning  Other food plan options you can search for on the internet and check out include: Dea DALLAS, [facility]  Please consider health psychologist to discuss how depression and/or anxiety impact your eating.  Progressively increase physical activity to 60 minutes, including combination of cardio & resistance training x 5 days weekly.  Plan to go to the [facility] to get an exercise assessment and recommendation. You can either plan to complete the entire program there or take your new skills to the gym of your choice. If you do not hear from an exercise professional from the program within a week, please call our clinic nurse at [phone].  Consider hiring a  to develop a structured progressive workout plan that includes both cardio and resistance training. Obesity is a disease according to the IRS, so you may be able to use some pre-tax dollars from your medical flexible spending account if you get a letter from your doctor and/or fill out a plan-specific form.    RTC:    12 weeks with Endocrinologist, 4 weeks with advanced practice provider, monthly meetings with RD    TIME: 45 min spent on evaluation, management, counseling, education, & motivational interviewing with greater than 50% of the total time was spent on counseling and coordinating care    Sincerely,    KAMARI SANTOYO MD, MPH  Staff Endocrinologist

## 2019-01-22 DIAGNOSIS — I10 BENIGN ESSENTIAL HYPERTENSION: ICD-10-CM

## 2019-01-22 RX ORDER — LOSARTAN POTASSIUM 25 MG/1
25 TABLET ORAL DAILY
Qty: 30 TABLET | Refills: 3 | Status: SHIPPED | OUTPATIENT
Start: 2019-01-22 | End: 2019-04-29

## 2019-01-22 NOTE — TELEPHONE ENCOUNTER
Last Clinic Visit: 12/28/2017  [facility]    Appointment overdue-- may refill 30 days and staff message sent to clinic coordinator.

## 2019-04-28 DIAGNOSIS — I10 BENIGN ESSENTIAL HYPERTENSION: ICD-10-CM

## 2019-04-29 ENCOUNTER — TELEPHONE (OUTPATIENT)
Dept: FAMILY MEDICINE | Facility: CLINIC | Age: 44
End: 2019-04-29

## 2019-04-29 RX ORDER — LOSARTAN POTASSIUM 25 MG/1
25 TABLET ORAL DAILY
Qty: 30 TABLET | Refills: 0 | Status: SHIPPED | OUTPATIENT
Start: 2019-04-29 | End: 2019-05-18

## 2019-04-29 NOTE — TELEPHONE ENCOUNTER
Called patient to schedule a Blood Pressure follow up. Patient will be unable to refill her medications without an appointment.     LVM with clinic name and callback number. Sending letter.     Jessica Youngblood CMA

## 2019-04-29 NOTE — LETTER
April 29, 2019    Breanna Fischer  [address] [place] [address]      Dear: Breanna Fischer    You were recently referred for a Blood Pressure follow-up appointment by your primary care provider at the [facility]. We have called you to schedule this appointment, but have been unable to reach you. If you are interested in receiving this service, please call the [facility] at [phone]. We would be happy to schedule this appointment for you.      Thank you.      Sincerely,    Your Nurse Practitioner Team    [facility]  [address] [place] [id number]  Hours:  Monday-Friday 8:00 am - 5:00 pm   Phone Number: [phone]

## 2019-04-29 NOTE — TELEPHONE ENCOUNTER
Last Clinic Visit: Last Clinic Visit: 12/28/2017  [facility]    *2nd may refill-- 30 days and message sent to clinic coordinator for scheduling.

## 2019-05-18 DIAGNOSIS — I10 BENIGN ESSENTIAL HYPERTENSION: ICD-10-CM

## 2019-05-20 RX ORDER — LOSARTAN POTASSIUM 25 MG/1
25 TABLET ORAL DAILY
Qty: 14 TABLET | Refills: 0 | Status: SHIPPED | OUTPATIENT
Start: 2019-05-20 | End: 2019-07-15

## 2019-05-20 NOTE — TELEPHONE ENCOUNTER
Called patient to set up a BP follow up that is needed prior future medication refills.    Left a message for patient to call back to set up that appointment.    Dorita Pruitt, CMA

## 2019-05-20 NOTE — TELEPHONE ENCOUNTER
losartan (COZAAR) 25 MG tablet  Last Written Prescription Date:  4/29/19  Last Fill Quantity: 30,   # refills: 0  Last Office Visit : 12/28/17  Future Office visit:  None    14 day to pharmacy    Scheduling has been notified to contact the pt for appointment.

## 2019-06-12 DIAGNOSIS — I10 BENIGN ESSENTIAL HYPERTENSION: ICD-10-CM

## 2019-06-15 NOTE — TELEPHONE ENCOUNTER
losartan (COZAAR) 25 MG tablet  Last Written Prescription Date:  5/20/19  Last Fill Quantity: 14,   # refills: 0  Last Office Visit : 12/28/17  Future Office visit: none    Routing refill request to provider for review/approval because: lv 12/17.  Pt has been contacted for scheduling. No appt scheduled. Filled 4x since 1/19. rf or refuse?

## 2019-06-27 RX ORDER — LOSARTAN POTASSIUM 25 MG/1
25 TABLET ORAL DAILY
Qty: 7 TABLET | Refills: 0 | OUTPATIENT
Start: 2019-06-27

## 2019-06-27 NOTE — TELEPHONE ENCOUNTER
This patient has not been seen in PC since 12/17.  Needs follow up appointment before this med will be filled.

## 2019-07-11 ENCOUNTER — OFFICE VISIT (OUTPATIENT)
Dept: FAMILY MEDICINE | Facility: CLINIC | Age: 44
End: 2019-07-11
Payer: COMMERCIAL

## 2019-07-11 VITALS
HEART RATE: 75 BPM | SYSTOLIC BLOOD PRESSURE: 129 MMHG | OXYGEN SATURATION: 97 % | TEMPERATURE: 98.4 F | DIASTOLIC BLOOD PRESSURE: 81 MMHG

## 2019-07-11 DIAGNOSIS — R63.5 WEIGHT GAIN: ICD-10-CM

## 2019-07-11 DIAGNOSIS — I10 BENIGN ESSENTIAL HYPERTENSION: ICD-10-CM

## 2019-07-11 DIAGNOSIS — R79.89 LOW VITAMIN D LEVEL: Primary | ICD-10-CM

## 2019-07-11 DIAGNOSIS — R60.9 FLUID RETENTION: ICD-10-CM

## 2019-07-11 RX ORDER — HYDROCHLOROTHIAZIDE 12.5 MG/1
12.5 TABLET ORAL DAILY
Qty: 90 TABLET | Refills: 3 | Status: SHIPPED | OUTPATIENT
Start: 2019-07-11 | End: 2021-11-26

## 2019-07-11 ASSESSMENT — PATIENT HEALTH QUESTIONNAIRE - PHQ9
5. POOR APPETITE OR OVEREATING: NOT AT ALL
SUM OF ALL RESPONSES TO PHQ QUESTIONS 1-9: 0

## 2019-07-11 ASSESSMENT — ANXIETY QUESTIONNAIRES
5. BEING SO RESTLESS THAT IT IS HARD TO SIT STILL: NOT AT ALL
1. FEELING NERVOUS, ANXIOUS, OR ON EDGE: NOT AT ALL
3. WORRYING TOO MUCH ABOUT DIFFERENT THINGS: NOT AT ALL
IF YOU CHECKED OFF ANY PROBLEMS ON THIS QUESTIONNAIRE, HOW DIFFICULT HAVE THESE PROBLEMS MADE IT FOR YOU TO DO YOUR WORK, TAKE CARE OF THINGS AT HOME, OR GET ALONG WITH OTHER PEOPLE: NOT DIFFICULT AT ALL
6. BECOMING EASILY ANNOYED OR IRRITABLE: NOT AT ALL
GAD7 TOTAL SCORE: 0
2. NOT BEING ABLE TO STOP OR CONTROL WORRYING: NOT AT ALL
7. FEELING AFRAID AS IF SOMETHING AWFUL MIGHT HAPPEN: NOT AT ALL

## 2019-07-11 NOTE — NURSING NOTE
44 year old  Chief Complaint   Patient presents with     Hypertension     F/U, Would like labs,      Menopausal Sx     Questions       Blood pressure 129/81, pulse 75, temperature 98.4  F (36.9  C), temperature source Oral, last menstrual period 06/19/2019, SpO2 97 %, not currently breastfeeding. There is no height or weight on file to calculate BMI.  BP completed using cuff size:    Patient Active Problem List   Diagnosis     Morbid obesity (H)       Wt Readings from Last 2 Encounters:   12/14/18 124 kg (273 lb 6.4 oz)   08/18/18 122.5 kg (270 lb)     BP Readings from Last 3 Encounters:   07/11/19 129/81   12/14/18 133/79   08/18/18 146/81       Allergies   Allergen Reactions     Tea Tree Oil        Current Outpatient Medications   Medication     Castellani Paint 1.5 % LIQD     CETIRIZINE HCL PO     losartan (COZAAR) 25 MG tablet     mometasone (ELOCON) 0.1 % cream     amoxicillin-clavulanate (AUGMENTIN) 875-125 MG per tablet     naltrexone (DEPADE/REVIA) 50 MG tablet     No current facility-administered medications for this visit.        Social History     Tobacco Use     Smoking status: Never Smoker     Smokeless tobacco: Never Used   Substance Use Topics     Alcohol use: No     Drug use: No       Honoring Choices - Health Care Directive Guide offered to patient at time of visit.    Health Maintenance Due   Topic Date Due     PREVENTIVE CARE VISIT  1975     PAP  1975     HIV SCREENING  06/01/1988     DTAP/TDAP/TD IMMUNIZATION (1 - Tdap) 06/01/1998     LIPID  06/01/2018     PHQ-2  01/01/2019       Immunization History   Administered Date(s) Administered     Influenza (intradermal) 10/25/1996       No results found for: PAP      Recent Labs   Lab Test 08/18/18  1100   CR 0.59   GFRESTIMATED >90   GFRESTBLACK >90   POTASSIUM 4.1       PHQ-2 ( 1999 Pfizer) 7/30/2018 12/28/2017   Q1: Little interest or pleasure in doing things 0 0   Q2: Feeling down, depressed or hopeless 0 0   PHQ-2 Score 0 0       PHQ-9  SCORE 12/28/2017 7/11/2019   PHQ-9 Total Score 0 0       DANIELLE-7 SCORE 12/28/2017 7/11/2019   Total Score 0 0       No flowsheet data found.      Jessica Youngblood CMA  July 11, 2019 11:34 AM

## 2019-07-11 NOTE — PROGRESS NOTES
Breanna Fiscehr is a 44 year old female who presents today for several issues:    1.)  Breanna's period has become more irregular, she is noticing that her period comes late, it come irregularly, it does not last as long.  She is noticing some emotional lability, she is irritable or she cries easily.  She feels that she is experiencing more of these symptoms at work.    2.)  Breanna is consistently taking her losartan 25 mg daily, she is not regularly taking her BP at home.  She has been noticing some lower extremity fluid retention.  No chest pain, shortness of breath, no exercise or activity intolerance.      Review Of Systems   ROS: 10 point ROS neg other than the symptoms noted above in the HPI.    Past Medical History:   Diagnosis Date     Celiac disease      Hypertension      Past Surgical History:   Procedure Laterality Date     CHOLECYSTECTOMY       Social History     Socioeconomic History     Marital status:      Spouse name: Not on file     Number of children: Not on file     Years of education: Not on file     Highest education level: Not on file   Occupational History     Not on file   Social Needs     Financial resource strain: Not on file     Food insecurity:     Worry: Not on file     Inability: Not on file     Transportation needs:     Medical: Not on file     Non-medical: Not on file   Tobacco Use     Smoking status: Never Smoker     Smokeless tobacco: Never Used   Substance and Sexual Activity     Alcohol use: No     Drug use: No     Sexual activity: Never   Lifestyle     Physical activity:     Days per week: Not on file     Minutes per session: Not on file     Stress: Not on file   Relationships     Social connections:     Talks on phone: Not on file     Gets together: Not on file     Attends Taoism service: Not on file     Active member of club or organization: Not on file     Attends meetings of clubs or organizations: Not on file     Relationship status: Not on file     Intimate partner  violence:     Fear of current or ex partner: Not on file     Emotionally abused: Not on file     Physically abused: Not on file     Forced sexual activity: Not on file   Other Topics Concern     Parent/sibling w/ CABG, MI or angioplasty before 65F 55M? Not Asked   Social History Narrative     Not on file     Family History   Problem Relation Age of Onset     Obesity Mother      Thyroid Disease Mother      Thyroid Disease Father      Breast Cancer Paternal Grandmother      Obesity Sister      Thyroid Disease Sister      Melanoma No family hx of      Skin Cancer No family hx of        /81   Pulse 75   Temp 98.4  F (36.9  C) (Oral)   LMP 06/19/2019   SpO2 97%     Exam:  Constitutional: healthy, alert and no distress  Head: Normocephalic. No masses, lesions, tenderness or abnormalities  Neck: Neck supple. No adenopathy. Thyroid symmetric, normal size,, Carotids without bruits.  ENT: ENT exam normal, no neck nodes or sinus tenderness  Cardiovascular: negative, PMI normal. No lifts, heaves, or thrills. RRR. No murmurs, clicks gallops or rub  Respiratory: negative, Percussion normal. Good diaphragmatic excursion. Lungs clear  Neurologic: Gait normal. Reflexes normal and symmetric. Sensation grossly WNL.  Psychiatric: mentation appears normal and affect normal/bright      Assessment/Plan:  1. Low vitamin D level    - Vitamin D Level; Future    2. Benign essential hypertension    - Lipid panel reflex to direct LDL Fasting; Future  - TSH; Future  - Comprehensive metabolic panel - Results > 1hr; Future  - hydrochlorothiazide (HYDRODIURIL) 12.5 MG tablet; Take 1 tablet (12.5 mg) by mouth daily  Dispense: 90 tablet; Refill: 3    3. Weight gain    - Lipid panel reflex to direct LDL Fasting; Future  - TSH; Future  - Comprehensive metabolic panel - Results > 1hr; Future  - Hemoglobin A1c; Future    4. Fluid retention    - hydrochlorothiazide (HYDRODIURIL) 12.5 MG tablet; Take 1 tablet (12.5 mg) by mouth daily  Dispense:  90 tablet; Refill: 3  5.)  Perimenopause    We discussed integrative and alternative care for perimenopausal symptoms.  Breanna will make an appointment with Sarai Amezquita, acupuncturist and traditional chinese medicine specialist.      RTC in 6 months.

## 2019-07-12 ASSESSMENT — ANXIETY QUESTIONNAIRES: GAD7 TOTAL SCORE: 0

## 2019-07-15 DIAGNOSIS — I10 BENIGN ESSENTIAL HYPERTENSION: ICD-10-CM

## 2019-07-15 RX ORDER — LOSARTAN POTASSIUM 25 MG/1
25 TABLET ORAL DAILY
Qty: 90 TABLET | Refills: 3 | Status: SHIPPED | OUTPATIENT
Start: 2019-07-15 | End: 2020-05-14

## 2019-07-26 DIAGNOSIS — I10 BENIGN ESSENTIAL HYPERTENSION: ICD-10-CM

## 2019-07-26 DIAGNOSIS — R79.89 LOW VITAMIN D LEVEL: ICD-10-CM

## 2019-07-26 DIAGNOSIS — R63.5 WEIGHT GAIN: ICD-10-CM

## 2019-07-26 LAB
ALBUMIN SERPL-MCNC: 3.6 G/DL (ref 3.4–5)
ALP SERPL-CCNC: 90 U/L (ref 40–150)
ALT SERPL W P-5'-P-CCNC: 24 U/L (ref 0–50)
ANION GAP SERPL CALCULATED.3IONS-SCNC: 8 MMOL/L (ref 3–14)
AST SERPL W P-5'-P-CCNC: 18 U/L (ref 0–45)
BILIRUB SERPL-MCNC: 0.5 MG/DL (ref 0.2–1.3)
BUN SERPL-MCNC: 16 MG/DL (ref 7–30)
CALCIUM SERPL-MCNC: 8.7 MG/DL (ref 8.5–10.1)
CHLORIDE SERPL-SCNC: 105 MMOL/L (ref 94–109)
CHOLEST SERPL-MCNC: 162 MG/DL
CO2 SERPL-SCNC: 28 MMOL/L (ref 20–32)
CREAT SERPL-MCNC: 0.62 MG/DL (ref 0.52–1.04)
DEPRECATED CALCIDIOL+CALCIFEROL SERPL-MC: 28 UG/L (ref 20–75)
GFR SERPL CREATININE-BSD FRML MDRD: >90 ML/MIN/{1.73_M2}
GLUCOSE SERPL-MCNC: 113 MG/DL (ref 70–99)
HBA1C MFR BLD: 6.5 % (ref 0–5.6)
HDLC SERPL-MCNC: 42 MG/DL
LDLC SERPL CALC-MCNC: 102 MG/DL
NONHDLC SERPL-MCNC: 120 MG/DL
POTASSIUM SERPL-SCNC: 3.5 MMOL/L (ref 3.4–5.3)
PROT SERPL-MCNC: 7.4 G/DL (ref 6.8–8.8)
SODIUM SERPL-SCNC: 141 MMOL/L (ref 133–144)
TRIGL SERPL-MCNC: 88 MG/DL
TSH SERPL DL<=0.005 MIU/L-ACNC: 0.9 MU/L (ref 0.4–4)

## 2019-07-26 PROCEDURE — 80061 LIPID PANEL: CPT | Performed by: NURSE PRACTITIONER

## 2019-07-26 PROCEDURE — 82306 VITAMIN D 25 HYDROXY: CPT | Performed by: NURSE PRACTITIONER

## 2019-07-26 PROCEDURE — 36415 COLL VENOUS BLD VENIPUNCTURE: CPT | Performed by: NURSE PRACTITIONER

## 2019-07-26 PROCEDURE — 83036 HEMOGLOBIN GLYCOSYLATED A1C: CPT | Performed by: NURSE PRACTITIONER

## 2019-07-26 PROCEDURE — 80053 COMPREHEN METABOLIC PANEL: CPT | Performed by: NURSE PRACTITIONER

## 2019-07-26 PROCEDURE — 84443 ASSAY THYROID STIM HORMONE: CPT | Performed by: NURSE PRACTITIONER

## 2019-08-22 ENCOUNTER — OFFICE VISIT (OUTPATIENT)
Dept: FAMILY MEDICINE | Facility: CLINIC | Age: 44
End: 2019-08-22
Payer: COMMERCIAL

## 2019-08-22 VITALS
HEART RATE: 80 BPM | TEMPERATURE: 98.1 F | DIASTOLIC BLOOD PRESSURE: 81 MMHG | OXYGEN SATURATION: 97 % | SYSTOLIC BLOOD PRESSURE: 125 MMHG

## 2019-08-22 DIAGNOSIS — I10 BENIGN ESSENTIAL HYPERTENSION: Primary | ICD-10-CM

## 2019-08-22 RX ORDER — CHLORTHALIDONE 25 MG/1
25 TABLET ORAL DAILY
Qty: 90 TABLET | Refills: 1 | Status: SHIPPED | OUTPATIENT
Start: 2019-08-22 | End: 2020-01-29

## 2019-08-22 NOTE — NURSING NOTE
44 year old  Chief Complaint   Patient presents with     Hypertension     F/U, with Losartan is always fine, Doesn't think HTCZ is not enough, stamina is 50 percent better but still has a lot of edema       Blood pressure 125/81, pulse 80, temperature 98.1  F (36.7  C), temperature source Oral, SpO2 97 %, not currently breastfeeding. There is no height or weight on file to calculate BMI.  BP completed using cuff size:    Patient Active Problem List   Diagnosis     Morbid obesity (H)       Wt Readings from Last 2 Encounters:   12/14/18 124 kg (273 lb 6.4 oz)   08/18/18 122.5 kg (270 lb)     BP Readings from Last 3 Encounters:   08/22/19 125/81   07/11/19 129/81   12/14/18 133/79       Allergies   Allergen Reactions     Tea Tree Oil        Current Outpatient Medications   Medication     Castellani Paint 1.5 % LIQD     CETIRIZINE HCL PO     hydrochlorothiazide (HYDRODIURIL) 12.5 MG tablet     losartan (COZAAR) 25 MG tablet     mometasone (ELOCON) 0.1 % cream     amoxicillin-clavulanate (AUGMENTIN) 875-125 MG per tablet     naltrexone (DEPADE/REVIA) 50 MG tablet     No current facility-administered medications for this visit.        Social History     Tobacco Use     Smoking status: Never Smoker     Smokeless tobacco: Never Used   Substance Use Topics     Alcohol use: No     Drug use: No       Honoring Choices - Health Care Directive Guide offered to patient at time of visit.    Health Maintenance Due   Topic Date Due     PREVENTIVE CARE VISIT  1973     PAP  1973     HIV SCREENING  06/01/1988     DTAP/TDAP/TD IMMUNIZATION (1 - Tdap) 06/01/1998       Immunization History   Administered Date(s) Administered     Influenza (intradermal) 10/25/1996       No results found for: PAP      Recent Labs   Lab Test 07/26/19  0943 08/18/18  1100   A1C 6.5*  --    *  --    HDL 42*  --    TRIG 88  --    ALT 24  --    CR 0.62 0.59   GFRESTIMATED >90 >90   GFRESTBLACK >90 >90   ALBUMIN 3.6  --    POTASSIUM 3.5 4.1    TSH 0.90  --        PHQ-2 ( 1999 Pfizer) 7/30/2018 12/28/2017   Q1: Little interest or pleasure in doing things 0 0   Q2: Feeling down, depressed or hopeless 0 0   PHQ-2 Score 0 0       PHQ-9 SCORE 12/28/2017 7/11/2019   PHQ-9 Total Score 0 0       DANIELLE-7 SCORE 12/28/2017 7/11/2019   Total Score 0 0       No flowsheet data found.      Jessica Youngblood, [facility]  August 22, 2019 12:59 PM

## 2019-09-12 NOTE — PROGRESS NOTES
Breanna Fischer is a 44 year old female who presents today to follow up on her hypertension.  Breanna notes that there has been a very minimal improvement in her fluid retention in her lower extremities, she is wondering if the diuretic medication is working.  She is more active, she is watching her diet more since our last visit.  She denies chest pain, shortness of breath.  She does not have exercise intolerance.    Review Of Systems   ROS: 10 point ROS neg other than the symptoms noted above in the HPI.      Past Medical History:   Diagnosis Date     Celiac disease      Hypertension      Past Surgical History:   Procedure Laterality Date     CHOLECYSTECTOMY       Social History     Socioeconomic History     Marital status:      Spouse name: Not on file     Number of children: Not on file     Years of education: Not on file     Highest education level: Not on file   Occupational History     Not on file   Social Needs     Financial resource strain: Not on file     Food insecurity:     Worry: Not on file     Inability: Not on file     Transportation needs:     Medical: Not on file     Non-medical: Not on file   Tobacco Use     Smoking status: Never Smoker     Smokeless tobacco: Never Used   Substance and Sexual Activity     Alcohol use: No     Drug use: No     Sexual activity: Never   Lifestyle     Physical activity:     Days per week: Not on file     Minutes per session: Not on file     Stress: Not on file   Relationships     Social connections:     Talks on phone: Not on file     Gets together: Not on file     Attends Restorationist service: Not on file     Active member of club or organization: Not on file     Attends meetings of clubs or organizations: Not on file     Relationship status: Not on file     Intimate partner violence:     Fear of current or ex partner: Not on file     Emotionally abused: Not on file     Physically abused: Not on file     Forced sexual activity: Not on file   Other Topics Concern      Parent/sibling w/ CABG, MI or angioplasty before 65F 55M? Not Asked   Social History Narrative     Not on file     Family History   Problem Relation Age of Onset     Obesity Mother      Thyroid Disease Mother      Thyroid Disease Father      Breast Cancer Paternal Grandmother      Obesity Sister      Thyroid Disease Sister      Melanoma No family hx of      Skin Cancer No family hx of        /81   Pulse 80   Temp 98.1  F (36.7  C) (Oral)   SpO2 97%     Exam:  Constitutional: healthy, alert and no distress  Head: Normocephalic. No masses, lesions, tenderness or abnormalities  Neck: Neck supple. No adenopathy. Thyroid symmetric, normal size,, Carotids without bruits.  ENT: ENT exam normal, no neck nodes or sinus tenderness  Cardiovascular: negative, PMI normal. No lifts, heaves, or thrills. RRR. No murmurs, clicks gallops or rub.  /81  Respiratory: negative, Percussion normal. Good diaphragmatic excursion. Lungs clear  : Deferred  Musculoskeletal: extremities normal- no gross deformities noted, gait normal, normal muscle tone and trace to 1+  ankle edema  Psychiatric: mentation appears normal and affect normal/bright    Assessment/Plan:  1. Benign essential hypertension    - chlorthalidone (HYGROTON) 25 MG tablet; Take 1 tablet (25 mg) by mouth daily  Dispense: 90 tablet; Refill: 1  Stop hydrochlorothiazide    Return to clinic in 3 months

## 2020-02-24 DIAGNOSIS — M79.675 PAIN OF LEFT GREAT TOE: Primary | ICD-10-CM

## 2020-02-24 DIAGNOSIS — M79.674 GREAT TOE PAIN, RIGHT: ICD-10-CM

## 2020-02-24 DIAGNOSIS — M79.673 PAIN OF FOOT, UNSPECIFIED LATERALITY: ICD-10-CM

## 2020-03-15 ENCOUNTER — HEALTH MAINTENANCE LETTER (OUTPATIENT)
Age: 45
End: 2020-03-15

## 2020-05-14 ENCOUNTER — VIRTUAL VISIT (OUTPATIENT)
Dept: FAMILY MEDICINE | Facility: CLINIC | Age: 45
End: 2020-05-14
Payer: COMMERCIAL

## 2020-05-14 DIAGNOSIS — E78.5 HYPERLIPIDEMIA LDL GOAL <100: ICD-10-CM

## 2020-05-14 DIAGNOSIS — I10 BENIGN ESSENTIAL HYPERTENSION: ICD-10-CM

## 2020-05-14 DIAGNOSIS — R73.03 PREDIABETES: ICD-10-CM

## 2020-05-14 DIAGNOSIS — R79.89 LOW VITAMIN D LEVEL: Primary | ICD-10-CM

## 2020-05-14 DIAGNOSIS — I10 ESSENTIAL HYPERTENSION: ICD-10-CM

## 2020-05-14 RX ORDER — LOSARTAN POTASSIUM 25 MG/1
25 TABLET ORAL DAILY
Qty: 90 TABLET | Refills: 3 | Status: SHIPPED | OUTPATIENT
Start: 2020-05-14 | End: 2020-08-06

## 2020-05-14 NOTE — PROGRESS NOTES
"Breanna Fischer is a 44 year old female who is being evaluated via a billable video visit.      The patient has been notified of following:     \"This video visit will be conducted via a call between you and your physician/provider. We have found that certain health care needs can be provided without the need for an in-person physical exam.  This service lets us provide the care you need with a video conversation.  If a prescription is necessary we can send it directly to your pharmacy.  If lab work is needed we can place an order for that and you can then stop by our lab to have the test done at a later time.    Video visits are billed at different rates depending on your insurance coverage.  Please reach out to your insurance provider with any questions.    If during the course of the call the physician/provider feels a video visit is not appropriate, you will not be charged for this service.\"    Patient has given verbal consent for Video visit? Yes    How would you like to obtain your AVS? [facility]    Patient would like the video invitation sent by: Text to cell phone: [phone]    Will anyone else be joining your video visit? No    Subjective     Breanna Fischer is a 44 year old female who presents today via video visit for the following health issues:  She is very excited because she has started a plant-based diet, 3 1/2 months ago, and is feeling like it is going really well.  She is trying to manage her lipids, prediabetes, her other health issues impacted by lifestyle.  She has also begun her 6 month active period, she is a big , she is outside a lot during this period of time.  She is losing weight be very slowly.    Breanna had some skin issues on her left leg and foot from swelling, that is much better after starting her diet and after seeing a derm person who used gentian blue for her skin.  She has not been taking her vitamin d and would like to do another lab test to see where that is ant.    Video Start " "Time: 1300    Review of Systems   Constitutional, HEENT, cardiovascular, pulmonary, gi and gu systems are negative, except as otherwise noted.      Objective    There were no vitals taken for this visit.  Estimated body mass index is 42.5 kg/m  as calculated from the following:    Height as of 12/14/18: 1.708 m (5' 7.25\").    Weight as of 12/14/18: 124 kg (273 lb 6.4 oz).  Physical Exam     GENERAL: Healthy, alert and no distress  EYES: Eyes grossly normal to inspection.  No discharge or erythema, or obvious scleral/conjunctival abnormalities.  RESP: No audible wheeze, cough, or visible cyanosis.  No visible retractions or increased work of breathing.    SKIN: Visible skin clear. No significant rash, abnormal pigmentation or lesions.  NEURO: Cranial nerves grossly intact.  Mentation and speech appropriate for age.  PSYCH: Mentation appears normal, affect normal/bright, judgement and insight intact, normal speech and appearance well-groomed.    (R79.89) Low vitamin D level  (primary encounter diagnosis)  Comment: not taking her supplement  Plan: Vitamin D Level            (R73.03) Prediabetes  Comment: changed diet, exercising more  Plan: Hemoglobin A1c            (E78.5) Hyperlipidemia LDL goal <100  Comment: changed diet, exercising more  Plan: Lipid panel reflex to direct LDL Fasting    (I10) Essential hypertension  Comment:stable  Plan: Basic metabolic panel  Plan: losartan (COZAAR) 25 MG tablets, one daily    Video-Visit Details    Type of service:  Video Visit    Video End Time: 1317    Originating Location (pt. Location): Home    Distant Location (provider location):  [facility]     Platform used for Video Visit: Woodrow    No follow-ups on file.       Silvana Orellana NP        "

## 2020-06-05 DIAGNOSIS — I10 ESSENTIAL HYPERTENSION: ICD-10-CM

## 2020-06-05 DIAGNOSIS — R79.89 LOW VITAMIN D LEVEL: ICD-10-CM

## 2020-06-05 DIAGNOSIS — E78.5 HYPERLIPIDEMIA LDL GOAL <100: ICD-10-CM

## 2020-06-05 DIAGNOSIS — R73.03 PREDIABETES: ICD-10-CM

## 2020-06-05 LAB
ANION GAP SERPL CALCULATED.3IONS-SCNC: 4 MMOL/L (ref 3–14)
BUN SERPL-MCNC: 13 MG/DL (ref 7–30)
CALCIUM SERPL-MCNC: 8.1 MG/DL (ref 8.5–10.1)
CHLORIDE SERPL-SCNC: 111 MMOL/L (ref 94–109)
CHOLEST SERPL-MCNC: 141 MG/DL
CO2 SERPL-SCNC: 25 MMOL/L (ref 20–32)
CREAT SERPL-MCNC: 0.55 MG/DL (ref 0.52–1.04)
GFR SERPL CREATININE-BSD FRML MDRD: >90 ML/MIN/{1.73_M2}
GLUCOSE SERPL-MCNC: 106 MG/DL (ref 70–99)
HBA1C MFR BLD: 6.1 % (ref 0–5.6)
HDLC SERPL-MCNC: 36 MG/DL
LDLC SERPL CALC-MCNC: 88 MG/DL
NONHDLC SERPL-MCNC: 105 MG/DL
POTASSIUM SERPL-SCNC: 3.7 MMOL/L (ref 3.4–5.3)
SODIUM SERPL-SCNC: 140 MMOL/L (ref 133–144)
TRIGL SERPL-MCNC: 86 MG/DL

## 2020-06-05 PROCEDURE — 83036 HEMOGLOBIN GLYCOSYLATED A1C: CPT | Performed by: NURSE PRACTITIONER

## 2020-06-05 PROCEDURE — 80061 LIPID PANEL: CPT | Performed by: NURSE PRACTITIONER

## 2020-06-05 PROCEDURE — 36415 COLL VENOUS BLD VENIPUNCTURE: CPT | Performed by: NURSE PRACTITIONER

## 2020-06-05 PROCEDURE — 80048 BASIC METABOLIC PNL TOTAL CA: CPT | Performed by: NURSE PRACTITIONER

## 2020-06-05 PROCEDURE — 82306 VITAMIN D 25 HYDROXY: CPT | Performed by: NURSE PRACTITIONER

## 2020-06-06 LAB — DEPRECATED CALCIDIOL+CALCIFEROL SERPL-MC: 26 UG/L (ref 20–75)

## 2020-07-26 NOTE — PATIENT INSTRUCTIONS
Dr Myers    [phone] to set up appointment ask for Yoon   Impression/Plan:     1) stasis eczema with possible allergic contact dermatitis and atopic background    Plan patch tests with standard, emollients and preservatives, steroids and patients own A&D ointment    Castellani paint or Gentian violet paint before using the Mometasone cream on the leg.    Maybe try later compression bandage --> get first acute eczema under control    Use pure paraffin (Vaseline) for later phase if dry    Use Vanicream soap     Follow-up next week  
oral

## 2020-08-03 DIAGNOSIS — I10 BENIGN ESSENTIAL HYPERTENSION: ICD-10-CM

## 2020-08-06 RX ORDER — LOSARTAN POTASSIUM 25 MG/1
TABLET ORAL
Qty: 90 TABLET | Refills: 3 | Status: SHIPPED | OUTPATIENT
Start: 2020-08-06 | End: 2021-08-13

## 2021-01-10 ENCOUNTER — HEALTH MAINTENANCE LETTER (OUTPATIENT)
Age: 46
End: 2021-01-10

## 2021-03-13 ENCOUNTER — HEALTH MAINTENANCE LETTER (OUTPATIENT)
Age: 46
End: 2021-03-13

## 2021-04-21 ENCOUNTER — TELEPHONE (OUTPATIENT)
Dept: NEUROLOGY | Facility: CLINIC | Age: 46
End: 2021-04-21

## 2021-05-08 ENCOUNTER — HEALTH MAINTENANCE LETTER (OUTPATIENT)
Age: 46
End: 2021-05-08

## 2021-08-10 DIAGNOSIS — I10 BENIGN ESSENTIAL HYPERTENSION: ICD-10-CM

## 2021-08-13 RX ORDER — LOSARTAN POTASSIUM 25 MG/1
25 TABLET ORAL DAILY
Qty: 90 TABLET | Refills: 0 | Status: SHIPPED | OUTPATIENT
Start: 2021-08-13 | End: 2021-11-08

## 2021-08-13 NOTE — TELEPHONE ENCOUNTER
LOSARTAN POTASSIUM 25MG TABS  Last Written Prescription Date:  8/6/2020  Last Fill Quantity: 90,   # refills: 3  Last Office Visit : 5/14/2020  Future Office visit:  None    Routing refill request to provider for review/approval because:  Over due office visit and labs  90 day sent to quan Duron RN  Central Triage Red Flags/Med Refills

## 2021-10-23 ENCOUNTER — HEALTH MAINTENANCE LETTER (OUTPATIENT)
Age: 46
End: 2021-10-23

## 2021-11-08 DIAGNOSIS — I10 BENIGN ESSENTIAL HYPERTENSION: ICD-10-CM

## 2021-11-08 RX ORDER — LOSARTAN POTASSIUM 25 MG/1
25 TABLET ORAL DAILY
Qty: 30 TABLET | Refills: 0 | Status: SHIPPED | OUTPATIENT
Start: 2021-11-08 | End: 2021-11-26

## 2021-11-08 NOTE — TELEPHONE ENCOUNTER
Reason for Call:  Medication or medication refill:    Do you use a [facility]?  Name of the pharmacy and phone number for the current request:  [facility]- ph. [phone]    Name of the medication requested: BP meds Losartan    Other request: Needs refill to get her to appointment on 11/26/21. Has enough until 11/17/21    Can we leave a detailed message on this number? YES    Phone number patient can be reached at: Cell number on file:    Telephone Information:   Mobile [phone]       Best Time: any    Call taken on 11/8/2021 at 12:41 PM by Fabiana Caraballo

## 2021-11-08 NOTE — TELEPHONE ENCOUNTER
Routing refill request to provider for review/approval because:  Patient needs to be seen because it has been more than 1 year since last office visit.  Failed BP protocol    Leatha JUNG RN  EP Triage

## 2021-11-09 NOTE — TELEPHONE ENCOUNTER
Never seen this patient anytime. Short fill given for now. Will need appointment for further refills.     Thank you,  Luciana Franco MD on 11/8/2021 at 8:27 PM

## 2021-11-26 ENCOUNTER — VIRTUAL VISIT (OUTPATIENT)
Dept: FAMILY MEDICINE | Facility: CLINIC | Age: 46
End: 2021-11-26
Payer: COMMERCIAL

## 2021-11-26 DIAGNOSIS — I10 BENIGN ESSENTIAL HYPERTENSION: Primary | ICD-10-CM

## 2021-11-26 DIAGNOSIS — R79.89 LOW VITAMIN D LEVEL: ICD-10-CM

## 2021-11-26 DIAGNOSIS — Z13.220 ENCOUNTER FOR LIPID SCREENING FOR CARDIOVASCULAR DISEASE: ICD-10-CM

## 2021-11-26 DIAGNOSIS — R73.03 PREDIABETES: ICD-10-CM

## 2021-11-26 DIAGNOSIS — Z12.31 ENCOUNTER FOR SCREENING MAMMOGRAM FOR BREAST CANCER: ICD-10-CM

## 2021-11-26 DIAGNOSIS — Z13.6 ENCOUNTER FOR LIPID SCREENING FOR CARDIOVASCULAR DISEASE: ICD-10-CM

## 2021-11-26 DIAGNOSIS — M79.673 PAIN OF FOOT, UNSPECIFIED LATERALITY: ICD-10-CM

## 2021-11-26 DIAGNOSIS — E66.01 MORBID OBESITY (H): ICD-10-CM

## 2021-11-26 PROCEDURE — 99213 OFFICE O/P EST LOW 20 MIN: CPT | Mod: 95 | Performed by: INTERNAL MEDICINE

## 2021-11-26 RX ORDER — LOSARTAN POTASSIUM 25 MG/1
25 TABLET ORAL DAILY
Qty: 90 TABLET | Refills: 3 | Status: SHIPPED | OUTPATIENT
Start: 2021-11-26 | End: 2022-12-21

## 2021-11-26 RX ORDER — LOSARTAN POTASSIUM 25 MG/1
25 TABLET ORAL DAILY
Qty: 90 TABLET | Refills: 0 | Status: SHIPPED | OUTPATIENT
Start: 2021-11-26 | End: 2021-11-26

## 2021-11-26 NOTE — PATIENT INSTRUCTIONS
"As discussed, please send me Home BP log measurement of your BP if its well controlled. Sent in refills for your Losartan.   Please make a Fasting labs on the orders placed at any nearby  clinic. Mammogram ordered.   Please make a Physical with PAP as discussed in 3 months.     ===================    Dietary Approaches to Stop Hypertension trial -- A different approach was evaluated in the Dietary Approaches to Stop Hypertension (DASH) trial [6]. Rather than evaluating sodium intake or weight loss, DASH randomly assigned 459 patients with BPs of less than 160/80 to 95 mmHg to one of three diets:  ?A control diet low in fruits, vegetables, and legumes and high in snacks, sweets, meats, and saturated fat.  ?A diet rich in fruits, vegetables, legumes and low in snacks and sweets.  ?A combination diet rich in fruits, vegetables, legumes, and low-fat dairy products and low in snacks, sweets, meats, and saturated and total fat (this combination diet is called the \"DASH diet\"). The DASH diet is comprised of four to five servings of fruit, four to five servings of vegetables, two to three servings of low-fat dairy per day, and <25 percent fat.  The following observations were noted in which the BP reductions were expressed in relation to the fall in BP seen with the control diet:  ?The fruits and vegetables diet reduced the BP by 2.8/1.1 mmHg, and the combination diet reduced the BP by 5.5/3.0.  ?These effects were more pronounced in patients with hypertension. With the combination diet, for example, the BP fell 11.4/5.5 mmHg in hypertensives versus 3.5/2.1 mmHg in the normotensives.  ?The antihypertensive effects were maximal by the end of week 2 with any of the diets and were then maintained for eight weeks.  "

## 2021-11-26 NOTE — PROGRESS NOTES
Breanna is a 46 year old who is being evaluated via a billable video visit.      How would you like to obtain your AVS? MyChart  If the video visit is dropped, the invitation should be resent by: Text to cell phone: [phone]  Will anyone else be joining your video visit? No    Video Start Time: Start: 11/26/2021 09:24 am    Assessment and Plan  1. Benign essential hypertension  Stable, as per the patient. Will continue current Losartan. Check renal function. Placed lab orders as requested by the patient. AVS given with instructions.   - losartan (COZAAR) 25 MG tablet; Take 1 tablet (25 mg) by mouth daily (Office Visit due for further refills. Please call [phone] to schedule.) Thank you  Dispense: 90 tablet; Refill: 3  - Comprehensive metabolic panel (BMP + Alb, Alk Phos, ALT, AST, Total. Bili, TP); Future    2. Encounter for screening mammogram for breast cancer  - MA Screen Bilateral w/Sánchez; Future    3. Encounter for lipid screening for cardiovascular disease  - Lipid panel reflex to direct LDL Fasting; Future    4. Low vitamin D level  - Vitamin D Deficiency; Future    5. Prediabetes  - Hemoglobin A1c; Future    7. Morbid obesity (H)  Noted risk factors with current comorbidities and need for weight management program to be considered.        Patient Instructions   As discussed, please send me Home BP log measurement of your BP if its well controlled. Sent in refills for your Losartan.   Please make a Fasting labs on the orders placed at any nearby  clinic. Mammogram ordered.   Please make a Physical with PAP as discussed in 3 months.     ===================    Dietary Approaches to Stop Hypertension trial -- A different approach was evaluated in the Dietary Approaches to Stop Hypertension (DASH) trial [6]. Rather than evaluating sodium intake or weight loss, DASH randomly assigned 459 patients with BPs of less than 160/80 to 95 mmHg to one of three diets:  ?A control diet low in fruits, vegetables, and  "legumes and high in snacks, sweets, meats, and saturated fat.  ?A diet rich in fruits, vegetables, legumes and low in snacks and sweets.  ?A combination diet rich in fruits, vegetables, legumes, and low-fat dairy products and low in snacks, sweets, meats, and saturated and total fat (this combination diet is called the \"DASH diet\"). The DASH diet is comprised of four to five servings of fruit, four to five servings of vegetables, two to three servings of low-fat dairy per day, and <25 percent fat.  The following observations were noted in which the BP reductions were expressed in relation to the fall in BP seen with the control diet:  ?The fruits and vegetables diet reduced the BP by 2.8/1.1 mmHg, and the combination diet reduced the BP by 5.5/3.0.  ?These effects were more pronounced in patients with hypertension. With the combination diet, for example, the BP fell 11.4/5.5 mmHg in hypertensives versus 3.5/2.1 mmHg in the normotensives.  ?The antihypertensive effects were maximal by the end of week 2 with any of the diets and were then maintained for eight weeks.    Return in about 3 months (around 2/26/2022), or if symptoms worsen or fail to improve, for Preventative Visit.    Luciana Franco MD  [facility] ANGELA Carlos is a 46 year old who presents for the following health issues       HPI     Hypertension Follow-up      Do you check your blood pressure regularly outside of the clinic? No     Are you following a low salt diet? No    Are your blood pressures ever more than 140 on the top number (systolic) OR more   than 90 on the bottom number (diastolic), for example 140/90? Unknown      How many servings of fruits and vegetables do you eat daily?  2-3    On average, how many sweetened beverages do you drink each day (Examples: soda, juice, sweet tea, etc.  Do NOT count diet or artificially sweetened beverages)?   1    How many days per week do you exercise enough to make " your heart beat faster? 4    How many minutes a day do you exercise enough to make your heart beat faster? 20 - 29    How many days per week do you miss taking your medication? 0      Pt is new to me , [facility] for [facility] visitfor managment of HTN, HLD , prediabetes. Last labs in 6/2020 showing A1C at 6.1 improved from the past in 2019 at 6.5 on diet . Currently only on Losartan 25 mg daily for HTN. Will need recheck of labs.        Allergies   Allergen Reactions     Tea Tree Oil         Past Medical History:   Diagnosis Date     Celiac disease      Hypertension        Past Surgical History:   Procedure Laterality Date     CHOLECYSTECTOMY         Family History   Problem Relation Age of Onset     Obesity Mother      Thyroid Disease Mother      Thyroid Disease Father      Breast Cancer Paternal Grandmother      Obesity Sister      Thyroid Disease Sister      Melanoma No family hx of      Skin Cancer No family hx of        Social History     Tobacco Use     Smoking status: Never Smoker     Smokeless tobacco: Never Used   Substance Use Topics     Alcohol use: No        Current Outpatient Medications   Medication     CETIRIZINE HCL PO     losartan (COZAAR) 25 MG tablet     No current facility-administered medications for this visit.        Review of Systems   Constitutional, HEENT, cardiovascular, pulmonary, GI, , musculoskeletal, neuro, skin, endocrine and psych systems are negative, except as otherwise noted.      Objective           Vitals:  No vitals were obtained today due to virtual visit.    Physical Exam   GENERAL: Healthy, alert and no distress  EYES: Eyes grossly normal to inspection.  No discharge or erythema, or obvious scleral/conjunctival abnormalities.  RESP: No audible wheeze, cough, or visible cyanosis.  No visible retractions or increased work of breathing.    SKIN: Visible skin clear. No significant rash, abnormal pigmentation or lesions.  NEURO: Cranial nerves grossly intact.   Mentation and speech appropriate for age.  PSYCH: Mentation appears normal, affect normal/bright, judgement and insight intact, normal speech and appearance well-groomed.    Labs and imaging reviewed and discussed with the patient.    Video-Visit Details    Type of service:  Video Visit    Video End Time:Stop: 11/26/2021 09:32 am    Originating Location (pt. Location): Home    Distant Location (provider location):  [facility]     Platform used for Video Visit: Woodrow     Start: 11/26/2021 09:24 am  Stop: 11/26/2021 09:32 am

## 2022-02-06 ASSESSMENT — ENCOUNTER SYMPTOMS
PALPITATIONS: 0
NERVOUS/ANXIOUS: 0
CONSTIPATION: 0
ABDOMINAL PAIN: 0
CHILLS: 0
BREAST MASS: 0
PARESTHESIAS: 0
WEAKNESS: 0
DIARRHEA: 0
NAUSEA: 0
DYSURIA: 0
SORE THROAT: 0
HEMATURIA: 0
HEADACHES: 0
HEMATOCHEZIA: 0
FEVER: 0
ARTHRALGIAS: 0
FREQUENCY: 0
JOINT SWELLING: 0
MYALGIAS: 0
SHORTNESS OF BREATH: 0
DIZZINESS: 0
HEARTBURN: 0
COUGH: 0
EYE PAIN: 0

## 2022-02-08 ENCOUNTER — OFFICE VISIT (OUTPATIENT)
Dept: FAMILY MEDICINE | Facility: CLINIC | Age: 47
End: 2022-02-08
Payer: COMMERCIAL

## 2022-02-08 VITALS
OXYGEN SATURATION: 97 % | HEIGHT: 68 IN | BODY MASS INDEX: 42.37 KG/M2 | DIASTOLIC BLOOD PRESSURE: 80 MMHG | HEART RATE: 59 BPM | TEMPERATURE: 98.3 F | WEIGHT: 279.6 LBS | SYSTOLIC BLOOD PRESSURE: 126 MMHG

## 2022-02-08 DIAGNOSIS — Z11.4 SCREENING FOR HIV (HUMAN IMMUNODEFICIENCY VIRUS): ICD-10-CM

## 2022-02-08 DIAGNOSIS — I10 BENIGN ESSENTIAL HYPERTENSION: ICD-10-CM

## 2022-02-08 DIAGNOSIS — Z00.00 ROUTINE GENERAL MEDICAL EXAMINATION AT A HEALTH CARE FACILITY: Primary | ICD-10-CM

## 2022-02-08 DIAGNOSIS — R73.03 PREDIABETES: ICD-10-CM

## 2022-02-08 DIAGNOSIS — Z23 NEED FOR VACCINATION: ICD-10-CM

## 2022-02-08 DIAGNOSIS — Z12.11 SCREEN FOR COLON CANCER: ICD-10-CM

## 2022-02-08 DIAGNOSIS — R60.9 NON-PITTING EDEMA: ICD-10-CM

## 2022-02-08 DIAGNOSIS — K90.0 CELIAC DISEASE: ICD-10-CM

## 2022-02-08 DIAGNOSIS — Z13.6 ENCOUNTER FOR LIPID SCREENING FOR CARDIOVASCULAR DISEASE: ICD-10-CM

## 2022-02-08 DIAGNOSIS — Z12.31 ENCOUNTER FOR SCREENING MAMMOGRAM FOR BREAST CANCER: ICD-10-CM

## 2022-02-08 DIAGNOSIS — R60.0 PERIPHERAL EDEMA: ICD-10-CM

## 2022-02-08 DIAGNOSIS — R79.89 LOW VITAMIN D LEVEL: ICD-10-CM

## 2022-02-08 DIAGNOSIS — Z12.4 CERVICAL CANCER SCREENING: ICD-10-CM

## 2022-02-08 DIAGNOSIS — Z13.220 ENCOUNTER FOR LIPID SCREENING FOR CARDIOVASCULAR DISEASE: ICD-10-CM

## 2022-02-08 DIAGNOSIS — E66.01 MORBID OBESITY (H): ICD-10-CM

## 2022-02-08 DIAGNOSIS — Z11.59 NEED FOR HEPATITIS C SCREENING TEST: ICD-10-CM

## 2022-02-08 DIAGNOSIS — R06.83 HABITUAL SNORING: ICD-10-CM

## 2022-02-08 LAB
ALBUMIN SERPL-MCNC: 3.7 G/DL (ref 3.4–5)
ALP SERPL-CCNC: 82 U/L (ref 40–150)
ALT SERPL W P-5'-P-CCNC: 27 U/L (ref 0–50)
ANION GAP SERPL CALCULATED.3IONS-SCNC: 4 MMOL/L (ref 3–14)
AST SERPL W P-5'-P-CCNC: 12 U/L (ref 0–45)
BILIRUB SERPL-MCNC: 0.4 MG/DL (ref 0.2–1.3)
BUN SERPL-MCNC: 18 MG/DL (ref 7–30)
CALCIUM SERPL-MCNC: 8.8 MG/DL (ref 8.5–10.1)
CHLORIDE BLD-SCNC: 110 MMOL/L (ref 94–109)
CHOLEST SERPL-MCNC: 140 MG/DL
CO2 SERPL-SCNC: 25 MMOL/L (ref 20–32)
CREAT SERPL-MCNC: 0.57 MG/DL (ref 0.52–1.04)
ERYTHROCYTE [DISTWIDTH] IN BLOOD BY AUTOMATED COUNT: 12.7 % (ref 10–15)
FASTING STATUS PATIENT QL REPORTED: YES
GFR SERPL CREATININE-BSD FRML MDRD: >90 ML/MIN/1.73M2
GLUCOSE BLD-MCNC: 102 MG/DL (ref 70–99)
HBA1C MFR BLD: 5.9 % (ref 0–5.6)
HCT VFR BLD AUTO: 43.7 % (ref 35–47)
HDLC SERPL-MCNC: 38 MG/DL
HGB BLD-MCNC: 14.5 G/DL (ref 11.7–15.7)
LDLC SERPL CALC-MCNC: 81 MG/DL
MCH RBC QN AUTO: 30.5 PG (ref 26.5–33)
MCHC RBC AUTO-ENTMCNC: 33.2 G/DL (ref 31.5–36.5)
MCV RBC AUTO: 92 FL (ref 78–100)
NONHDLC SERPL-MCNC: 102 MG/DL
NT-PROBNP SERPL-MCNC: 31 PG/ML (ref 0–125)
PLATELET # BLD AUTO: 229 10E3/UL (ref 150–450)
POTASSIUM BLD-SCNC: 4.1 MMOL/L (ref 3.4–5.3)
PROT SERPL-MCNC: 7.6 G/DL (ref 6.8–8.8)
RBC # BLD AUTO: 4.76 10E6/UL (ref 3.8–5.2)
SODIUM SERPL-SCNC: 139 MMOL/L (ref 133–144)
TRIGL SERPL-MCNC: 103 MG/DL
TSH SERPL DL<=0.005 MIU/L-ACNC: 1.29 MU/L (ref 0.4–4)
VIT B12 SERPL-MCNC: 400 PG/ML (ref 193–986)
WBC # BLD AUTO: 4.6 10E3/UL (ref 4–11)

## 2022-02-08 PROCEDURE — 86803 HEPATITIS C AB TEST: CPT | Performed by: INTERNAL MEDICINE

## 2022-02-08 PROCEDURE — 80053 COMPREHEN METABOLIC PANEL: CPT | Performed by: INTERNAL MEDICINE

## 2022-02-08 PROCEDURE — 84443 ASSAY THYROID STIM HORMONE: CPT | Performed by: INTERNAL MEDICINE

## 2022-02-08 PROCEDURE — 80061 LIPID PANEL: CPT | Performed by: INTERNAL MEDICINE

## 2022-02-08 PROCEDURE — 99214 OFFICE O/P EST MOD 30 MIN: CPT | Mod: 25 | Performed by: INTERNAL MEDICINE

## 2022-02-08 PROCEDURE — 82607 VITAMIN B-12: CPT | Performed by: INTERNAL MEDICINE

## 2022-02-08 PROCEDURE — 83036 HEMOGLOBIN GLYCOSYLATED A1C: CPT | Performed by: INTERNAL MEDICINE

## 2022-02-08 PROCEDURE — 82306 VITAMIN D 25 HYDROXY: CPT | Performed by: INTERNAL MEDICINE

## 2022-02-08 PROCEDURE — 99396 PREV VISIT EST AGE 40-64: CPT | Mod: 25 | Performed by: INTERNAL MEDICINE

## 2022-02-08 PROCEDURE — 83880 ASSAY OF NATRIURETIC PEPTIDE: CPT | Performed by: INTERNAL MEDICINE

## 2022-02-08 PROCEDURE — 90471 IMMUNIZATION ADMIN: CPT | Performed by: INTERNAL MEDICINE

## 2022-02-08 PROCEDURE — 87624 HPV HI-RISK TYP POOLED RSLT: CPT | Performed by: INTERNAL MEDICINE

## 2022-02-08 PROCEDURE — 85027 COMPLETE CBC AUTOMATED: CPT | Performed by: INTERNAL MEDICINE

## 2022-02-08 PROCEDURE — G0145 SCR C/V CYTO,THINLAYER,RESCR: HCPCS | Performed by: INTERNAL MEDICINE

## 2022-02-08 PROCEDURE — 36415 COLL VENOUS BLD VENIPUNCTURE: CPT | Performed by: INTERNAL MEDICINE

## 2022-02-08 PROCEDURE — 87389 HIV-1 AG W/HIV-1&-2 AB AG IA: CPT | Performed by: INTERNAL MEDICINE

## 2022-02-08 PROCEDURE — 90714 TD VACC NO PRESV 7 YRS+ IM: CPT | Performed by: INTERNAL MEDICINE

## 2022-02-08 ASSESSMENT — ENCOUNTER SYMPTOMS
PARESTHESIAS: 0
SHORTNESS OF BREATH: 0
JOINT SWELLING: 0
FEVER: 0
HEARTBURN: 0
DYSURIA: 0
NERVOUS/ANXIOUS: 0
HEMATURIA: 0
COUGH: 0
WEAKNESS: 0
DIARRHEA: 0
PALPITATIONS: 0
FREQUENCY: 0
CONSTIPATION: 0
ABDOMINAL PAIN: 0
HEMATOCHEZIA: 0
CHILLS: 0
ARTHRALGIAS: 0
NAUSEA: 0
BREAST MASS: 0
EYE PAIN: 0
MYALGIAS: 0
SORE THROAT: 0
HEADACHES: 0
DIZZINESS: 0

## 2022-02-08 ASSESSMENT — MIFFLIN-ST. JEOR: SCORE: 1939.14

## 2022-02-08 NOTE — PROGRESS NOTES
SUBJECTIVE:   CC: Breanna Fischer is an 46 year old woman who presents for preventive health visit.       Patient has been advised of split billing requirements and indicates understanding: Yes  Healthy Habits:     Getting at least 3 servings of Calcium per day:  Yes    Bi-annual eye exam:  Yes    Dental care twice a year:  Yes    Sleep apnea or symptoms of sleep apnea:  None    Diet:  Gluten-free/reduced    Frequency of exercise:  4-5 days/week    Duration of exercise:  15-30 minutes    Taking medications regularly:  Yes    Medication side effects:  None    PHQ-2 Total Score: 0    Additional concerns today:  No    Today's PHQ-2 Score:   PHQ-2 ( 1999 [employer]) 2/6/2022   Q1: Little interest or pleasure in doing things 0   Q2: Feeling down, depressed or hopeless 0   PHQ-2 Score 0   PHQ-2 Total Score (12-17 Years)- Positive if 3 or more points; Administer PHQ-A if positive -   Q1: Little interest or pleasure in doing things Not at all   Q2: Feeling down, depressed or hopeless Not at all   PHQ-2 Score 0       Abuse: Current or Past (Physical, Sexual or Emotional) - No  Do you feel safe in your environment? Yes    Have you ever done Advance Care Planning? (For example, a Health Directive, POLST, or a discussion with a medical provider or your loved ones about your wishes): No, advance care planning information given to patient to review.  Patient declined advance care planning discussion at this time.    Social History     Tobacco Use     Smoking status: Never Smoker     Smokeless tobacco: Never Used   Substance Use Topics     Alcohol use: No     If you drink alcohol do you typically have >3 drinks per day or >7 drinks per week? No    Alcohol Use 2/8/2022   Prescreen: >3 drinks/day or >7 drinks/week? -   Prescreen: >3 drinks/day or >7 drinks/week? No       Reviewed orders with patient.  Reviewed health maintenance and updated orders accordingly - Yes  Lab work is in process  Labs reviewed in EPIC    Breast Cancer  Screening:    FHS-7:   Breast CA Risk Assessment (FHS-7) 2022   Did any of your first-degree relatives have breast or ovarian cancer? No   Did any of your relatives have bilateral breast cancer? No   Did any man in your family have breast cancer? No   Did any woman in your family have breast and ovarian cancer? No   Did any woman in your family have breast cancer before age 50 y? No   Do you have 2 or more relatives with breast and/or ovarian cancer? No   Do you have 2 or more relatives with breast and/or bowel cancer? No     click delete button to remove this line now  Mammogram Screening: Recommended annual mammography  Pertinent mammograms are reviewed under the imaging tab.    History of abnormal Pap smear: NO - age 30- 65 PAP every 3 years recommended     Reviewed and updated as needed this visit by clinical staff  Tobacco  Allergies  Meds  Problems  Med Hx  Surg Hx  Fam Hx  Soc Hx         Reviewed and updated as needed this visit by Provider  Tobacco  Allergies  Meds  Problems  Med Hx  Surg Hx  Fam Hx        Past Medical History:   Diagnosis Date     Celiac disease      Hypertension       Past Surgical History:   Procedure Laterality Date     CHOLECYSTECTOMY       OB History    Para Term  AB Living   3 0 0 0 0 3   SAB IAB Ectopic Multiple Live Births   0 0 0 0 0      # Outcome Date GA Lbr Yvon/2nd Weight Sex Delivery Anes PTL Lv   3             2             1                 Review of Systems   Constitutional: Negative for chills and fever.   HENT: Negative for congestion, ear pain, hearing loss and sore throat.    Eyes: Negative for pain and visual disturbance.   Respiratory: Negative for cough and shortness of breath.    Cardiovascular: Negative for chest pain, palpitations and peripheral edema.   Gastrointestinal: Negative for abdominal pain, constipation, diarrhea, heartburn, hematochezia and nausea.   Breasts:  Negative for tenderness, breast mass and  "discharge.   Genitourinary: Negative for dysuria, frequency, genital sores, hematuria, pelvic pain, urgency, vaginal bleeding and vaginal discharge.   Musculoskeletal: Negative for arthralgias, joint swelling and myalgias.   Skin: Negative for rash.   Neurological: Negative for dizziness, weakness, headaches and paresthesias.   Psychiatric/Behavioral: Negative for mood changes. The patient is not nervous/anxious.      CONSTITUTIONAL: NEGATIVE for fever, chills, change in weight  INTEGUMENTARU/SKIN: NEGATIVE for worrisome rashes, moles or lesions  EYES: NEGATIVE for vision changes or irritation  ENT: NEGATIVE for ear, mouth and throat problems  RESP: NEGATIVE for significant cough or SOB  BREAST: NEGATIVE for masses, tenderness or discharge  CV: NEGATIVE for chest pain, palpitations or peripheral edema  GI: NEGATIVE for nausea, abdominal pain, heartburn, or change in bowel habits  : NEGATIVE for unusual urinary or vaginal symptoms. Periods are regular.  MUSCULOSKELETAL: NEGATIVE for significant arthralgias or myalgia  NEURO: NEGATIVE for weakness, dizziness or paresthesias  PSYCHIATRIC: NEGATIVE for changes in mood or affect     OBJECTIVE:   /80 (Cuff Size: Adult Large)   Pulse 59   Temp 98.3  F (36.8  C) (Tympanic)   Ht 1.715 m (5' 7.52\")   Wt 126.8 kg (279 lb 9.6 oz)   SpO2 97%   BMI 43.12 kg/m    Physical Exam  GENERAL: healthy, alert and no distress  EYES: Eyes grossly normal to inspection, PERRL and conjunctivae and sclerae normal  HENT: ear canals and TM's normal, nose and mouth without ulcers or lesions  NECK: no adenopathy, no asymmetry, masses, or scars and thyroid normal to palpation  RESP: lungs clear to auscultation - no rales, rhonchi or wheezes  BREAST: normal without masses, tenderness or nipple discharge and no palpable axillary masses or adenopathy  CV: regular rate and rhythm, normal S1 S2, no S3 or S4, no murmur, click or rub, no peripheral edema and peripheral pulses strong  ABDOMEN: " soft, nontender, no hepatosplenomegaly, no masses and bowel sounds normal  MS: no gross musculoskeletal defects noted, no edema  SKIN: no suspicious lesions or rashes  NEURO: Normal strength and tone, mentation intact and speech normal  PSYCH: mentation appears normal, affect normal/bright    Pelvic exam: normal vagina and vulva, normal cervix without lesions or tenderness, uterus normal size anteverted, adenxa normal in size without tenderness, pap smear done, exam chaperoned by nurse.    Diagnostic Test Results:  Labs reviewed in Epic    ASSESSMENT/PLAN:       Assessment and Plan  1. Routine general medical examination at a health care facility  Last seen pt on 11/2021 for establish care , HTN management . She is here for physical with last labs in 6/2020 with low calcium, A1C at 6.1 improved from 6.5 in 7/2019 at diabetes. Currently on Losartan. Does have new issues of peripheral edema  & Celiac which is updated in the problem list.   - REVIEW OF HEALTH MAINTENANCE PROTOCOL ORDERS  - HIV Antigen Antibody Combo; Future  - Hepatitis C Screen Reflex to HCV RNA Quant and Genotype; Future  - Pap Screen with HPV - recommended age 30 - 65 years  - Comprehensive metabolic panel (BMP + Alb, Alk Phos, ALT, AST, Total. Bili, TP); Future  - BNP-N terminal pro; Future  - Fecal colorectal cancer screen (FIT); Future  - Lipid panel reflex to direct LDL Fasting; Future  - Vitamin D Deficiency; Future  - Vitamin B12; Future  - MA Screen Bilateral w/Sánchez; Future  - TSH with free T4 reflex; Future  - TD PRESERV FREE, IM (7+ YRS) (DECAVAC/TENIVAC)    2. Screen for colon cancer  - Fecal colorectal cancer screen (FIT); Future    3. Screening for HIV (human immunodeficiency virus)  - HIV Antigen Antibody Combo; Future    4. Need for hepatitis C screening test  - Hepatitis C Screen Reflex to HCV RNA Quant and Genotype; Future    5. Cervical cancer screening  - Pap Screen with HPV - recommended age 30 - 65 years    6. Morbid obesity  (H)  - TSH with free T4 reflex; Future    7. Prediabetes  - Hemoglobin A1c; Future    8. Low vitamin D level  - Vitamin D Deficiency; Future    9. Benign essential hypertension  Well controlled, continue current Losartan.   - Comprehensive metabolic panel (BMP + Alb, Alk Phos, ALT, AST, Total. Bili, TP); Future    10. Peripheral edema  11. Non-pitting edema  Denies exertional dyspnea or Orthopnea. No EKG on file. Given no other cardiac symptoms this could be chronic stasis dependant edema. Will do BNP and do further recommendations.   - BNP-N terminal pro; Future    12. Celiac disease  Stable, Pt refuses any Colonscopies or Endoscopies offered since 2009 when diagnosed with Celiac disease, hyperparathyroidism , Vitamin D deficiency when she was screened Endocrinology at that time. Will await for lab results before adding further.   - Vitamin B12; Future  - CBC with platelets; Future    13. Encounter for lipid screening for cardiovascular disease  - Lipid panel reflex to direct LDL Fasting; Future    14. Encounter for screening mammogram for breast cancer  - MA Screen Bilateral w/Sánchez; Future    15. Need for vaccination  - TD PRESERV FREE, IM (7+ YRS) (DECAVAC/TENIVAC)    16. Habitual snoring  Offered Sleep studies which she is not opting at this time. All the risks understood.        Patient Instructions   As discussed, please do fasting labs .     ==================    Preventive Health Recommendations  Female Ages 40 to 49    Yearly exam:     See your health care provider every year in order to  1. Review health changes.   2. Discuss preventive care.    3. Review your medicines if your doctor prescribed any.      Get a Pap test every three years (unless you have an abnormal result and your provider advises testing more often).      If you get Pap tests with HPV test, you only need to test every 5 years, unless you have an abnormal result. You do not need a Pap test if your uterus was removed (hysterectomy) and you  "have not had cancer.      You should be tested each year for STDs (sexually transmitted diseases), if you're at risk.     Ask your doctor if you should have a mammogram.      Have a colonoscopy (test for colon cancer) if someone in your family has had colon cancer or polyps before age 50.       Have a cholesterol test every 5 years.       Have a diabetes test (fasting glucose) after age 45. If you are at risk for diabetes, you should have this test every 3 years.    Shots: Get a flu shot each year. Get a tetanus shot every 10 years.     Nutrition:     Eat at least 5 servings of fruits and vegetables each day.    Eat whole-grain bread, whole-wheat pasta and brown rice instead of white grains and rice.    Get adequate Calcium and Vitamin D.      Lifestyle    Exercise at least 150 minutes a week (an average of 30 minutes a day, 5 days a week). This will help you control your weight and prevent disease.    Limit alcohol to one drink per day.    No smoking.     Wear sunscreen to prevent skin cancer.    See your dentist every six months for an exam and cleaning.    Return in about 6 months (around 2022), or if symptoms worsen or fail to improve, for Follow up of last visit.    Luciana Franco MD  [facility]        Patient has been advised of split billing requirements and indicates understanding: Yes    COUNSELING:  Reviewed preventive health counseling, as reflected in patient instructions  Special attention given to:        Regular exercise       Healthy diet/nutrition       Vision screening       Immunizations    Vaccinated for: Td             Osteoporosis prevention/bone health       Consider Hep C screening for all patients one time for ages 18-79 years       HIV screeninx in teen years, 1x in adult years, and at intervals if high risk    Estimated body mass index is 43.12 kg/m  as calculated from the following:    Height as of this encounter: 1.715 m (5' 7.52\").    Weight as of " this encounter: 126.8 kg (279 lb 9.6 oz).    Weight management plan: Discussed healthy diet and exercise guidelines    She reports that she has never smoked. She has never used smokeless tobacco.      Counseling Resources:  ATP IV Guidelines  Pooled Cohorts Equation Calculator  Breast Cancer Risk Calculator  BRCA-Related Cancer Risk Assessment: FHS-7 Tool  FRAX Risk Assessment  ICSI Preventive Guidelines  Dietary Guidelines for Americans, 2010  USDA's MyPlate  ASA Prophylaxis  Lung CA Screening    Luciana Franco MD  [facility]

## 2022-02-08 NOTE — PATIENT INSTRUCTIONS
As discussed, please do fasting labs .     ==================    Preventive Health Recommendations  Female Ages 40 to 49    Yearly exam:     See your health care provider every year in order to  1. Review health changes.   2. Discuss preventive care.    3. Review your medicines if your doctor prescribed any.      Get a Pap test every three years (unless you have an abnormal result and your provider advises testing more often).      If you get Pap tests with HPV test, you only need to test every 5 years, unless you have an abnormal result. You do not need a Pap test if your uterus was removed (hysterectomy) and you have not had cancer.      You should be tested each year for STDs (sexually transmitted diseases), if you're at risk.     Ask your doctor if you should have a mammogram.      Have a colonoscopy (test for colon cancer) if someone in your family has had colon cancer or polyps before age 50.       Have a cholesterol test every 5 years.       Have a diabetes test (fasting glucose) after age 45. If you are at risk for diabetes, you should have this test every 3 years.    Shots: Get a flu shot each year. Get a tetanus shot every 10 years.     Nutrition:     Eat at least 5 servings of fruits and vegetables each day.    Eat whole-grain bread, whole-wheat pasta and brown rice instead of white grains and rice.    Get adequate Calcium and Vitamin D.      Lifestyle    Exercise at least 150 minutes a week (an average of 30 minutes a day, 5 days a week). This will help you control your weight and prevent disease.    Limit alcohol to one drink per day.    No smoking.     Wear sunscreen to prevent skin cancer.    See your dentist every six months for an exam and cleaning.

## 2022-02-09 LAB
DEPRECATED CALCIDIOL+CALCIFEROL SERPL-MC: 22 UG/L (ref 20–75)
HCV AB SERPL QL IA: NONREACTIVE
HIV 1+2 AB+HIV1 P24 AG SERPL QL IA: NONREACTIVE

## 2022-02-10 ENCOUNTER — TELEPHONE (OUTPATIENT)
Dept: FAMILY MEDICINE | Facility: CLINIC | Age: 47
End: 2022-02-10
Payer: COMMERCIAL

## 2022-02-10 LAB
BKR LAB AP GYN ADEQUACY: NORMAL
BKR LAB AP GYN INTERPRETATION: NORMAL
BKR LAB AP HPV REFLEX: NORMAL
BKR LAB AP PREVIOUS ABNORMAL: NORMAL
PATH REPORT.COMMENTS IMP SPEC: NORMAL
PATH REPORT.COMMENTS IMP SPEC: NORMAL
PATH REPORT.RELEVANT HX SPEC: NORMAL

## 2022-02-10 NOTE — TELEPHONE ENCOUNTER
"----- Message from Luciana Franco MD sent at 2/9/2022 11:11 PM CST -----  Your lab work is POSITIVE for Prediabetes. Please follow the diet below for improvement. Will need follow up on this.     All your other labs normal, you may see some highlighted which do not have Clinical significance.    Luciana Franco MD on 2/9/2022    ================================  American Diabetes Association (ADA) nutritional guidelines, which do not give specific total dietary compositional targets except for the following recommendations [1] that are in large part similar to the recommendations for the general population (see \"Healthy diet in adults\"):  ?A diet that includes carbohydrates from fruits, vegetables, whole grains, legumes, and low-fat milk is encouraged.  The ideal amount of carbohydrate intake is uncertain. However, monitoring carbohydrate intake (carbohydrate counting or experience-based estimation) is important in patients with diabetes, as carbohydrate intake directly determines postprandial blood glucose, and appropriate insulin adjustment for identified quantities of carbohydrate is one of the most important factors that can improve glycemic control.  ?A variety of eating patterns (Mediterranean, low fat, low carbohydrate, vegetarian) are acceptable.  ?Fat quality is more important than fat quantity. Trans fats contribute to coronary heart disease, while mono- and polyunsaturated fats (eg, those found in fish, olive oil, nuts) are relatively protective. Saturated fatty acids and different food sources of saturated fat have divergent effects on cardiovascular and metabolic health. Trans fatty acid consumption should be kept as low as possible.  ?Protein intake goals should be individualized but not lower than 0.8 g/kg body weight per day (the recommended daily allowance). Patients should be encouraged to substitute lean meats, fish, eggs, beans, peas, soy products, and nuts and seeds for red meat.  An " automatic reduction of dietary protein intake (eg, 15 to 19 percent of calories) below usual protein intake in patients who develop diabetic kidney disease is not recommended. The role of dietary protein restriction is uncertain, particularly in view of problems with compliance in patients already being treated with saturated fat and simple carbohydrate restriction. Furthermore, it is uncertain if a low-protein diet is significantly additive to other measures aimed at reducing cardiovascular risk and preserving renal function, such as angiotensin-converting enzyme (ACE) inhibition and aggressive control of blood pressure and blood glucose.  The usual daily intake of protein should be approximately 10 to 20 percent of total caloric intake. Higher levels of dietary protein intake (>20 percent of calories from protein or >1.3 g/kg/day) have been associated with increased albuminuria, more rapid kidney function loss, and cardiovascular disease (CVD) mortality and therefore should be avoided [69].  ?Fiber intake should be at least 14 grams per 1000 calories daily; higher fiber intake may improve glycemic control.  ?A reduced sodium intake of 2300 mg per day with a diet high in fruits, vegetables, and low-fat dairy products is prudent and has demonstrated beneficial effects on blood pressure.  ?Sugar-sweetened beverages should be avoided in order to control glycemia, weight, and reduce risk for CVD and fatty liver. Consumption of foods with added sugar that have the capacity to displace healthier, more nutrient-dense food choices should be minimized. Care should be taken to avoid excess calories from sucrose; however, foods containing sucrose may be substituted for other carbohydrates or covered with insulin or insulin secretagogue medications.  ?Sugar alcohols and non-nutritive sweeteners are safe when consumed within daily levels established by the US Food and Drug Administration (FDA). When calculating carbohydrate  content of foods, one-half of the sugar alcohol content can be counted in the total carbohydrate content of the food. Use of sugar alcohols needs to be balanced with their potential to cause gastrointestinal side effects in sensitive individuals.        AVOID THESE FOODS WITH HIGH GLYCEMIC INDEX      Dietary glycemic indices and glycemic load for the top 20 carbohydrate-contributing foods in the Nurses' Health Study in 1984  Foods Glycemic index#, % Carbohydrate per serving, g Glycemic load per serving  1. Cooked potatoes (mashed or baked)  102 37 38  2. White bread 100 13 13  3. Cold breakfast cereal Varies by cereal Varies by cereal Varies by cereal  4. Dark bread 102 12 12  5. Orange juice 75 20 15  6. Banana 88 27 24  7. White rice 102 45 46  8. Pizza 86 78 68  9. Pasta 71 40 28  10. English muffins 84 26 22  11. Fruit punch 95 44 42  12. Cola 90 39 35  13. Apple 55 21 12  14. Skim milk 46 11 5  15. Pancake 119 56 67  16. Table sugar 84 4 3  17. Jam 91 13 12  18. Cranberry juice 105 19 20  19. French fries 95 35 33  20. Candy 99 28 28    Please let me know if you have any questions.  Luciana Franco MD on 2/9/2022 at 11:08 PM

## 2022-02-11 LAB
HUMAN PAPILLOMA VIRUS 16 DNA: NEGATIVE
HUMAN PAPILLOMA VIRUS 18 DNA: NEGATIVE
HUMAN PAPILLOMA VIRUS FINAL DIAGNOSIS: NORMAL
HUMAN PAPILLOMA VIRUS OTHER HR: NEGATIVE

## 2022-04-05 ENCOUNTER — ANCILLARY PROCEDURE (OUTPATIENT)
Dept: MAMMOGRAPHY | Facility: CLINIC | Age: 47
End: 2022-04-05
Attending: INTERNAL MEDICINE
Payer: COMMERCIAL

## 2022-04-05 DIAGNOSIS — Z12.31 ENCOUNTER FOR SCREENING MAMMOGRAM FOR BREAST CANCER: ICD-10-CM

## 2022-04-05 DIAGNOSIS — Z00.00 ROUTINE GENERAL MEDICAL EXAMINATION AT A HEALTH CARE FACILITY: ICD-10-CM

## 2022-04-05 PROCEDURE — 77067 SCR MAMMO BI INCL CAD: CPT

## 2022-06-27 ENCOUNTER — NURSE TRIAGE (OUTPATIENT)
Dept: FAMILY MEDICINE | Facility: CLINIC | Age: 47
End: 2022-06-27

## 2022-06-27 NOTE — TELEPHONE ENCOUNTER
"PCP: Patient reports falling on Saturday, no known cause. Patient denies neuro symptoms or mechanical fall. Patient states they have had 3-4 unexplained falls since September 2021. Please advise on disposition.     CC: Fall: \"just went down\"  Mechanism: \"didn't trip on on anything\"  Patient reports they did not hit head  Denies numbness, tingling, weakness, chest pain, shortness of breath, loss of consciousness, dizziness.   No recent neuro symptoms, patient reports they did have their left arm going numb 5-8 years ago, \"lasted 3 hours then everything was normal\"  Denies changes/starting new medications.   One fall \"I think I got unbalanced walking on rocks\" but te last 3 falls cause unknown.     Writer advised if occurs again to be evaluated as soon as possible.      Callback: [phone] to leave detailed VM.     Kalpana Whitehead RN  [facility]                      Kalpana Whitehead RN  [facility]    Additional Information    Negative: Sounds like a life-threatening emergency to the triager    Negative: Information only call about a Well Adult (no illness or injury)    Negative: Nursing judgment    Negative: Nursing judgment    Negative: Nursing judgment    Nursing judgment    Protocols used: NO PROTOCOL AVAILABLE - SICK ADULT-A-[place]      "

## 2022-06-27 NOTE — TELEPHONE ENCOUNTER
Provider Recommendation Follow Up:   Unable to reach patient/caregiver. Left message to return call to [phone]     Lin FRANCES RN  EP Triage

## 2022-06-27 NOTE — TELEPHONE ENCOUNTER
Provider Response to 2nd Level Triage Request    I have reviewed the RN documentation. My recommendation is:  Face To Face Visit. Same Day: place patient on my schedule, as I have availability.     Looks like new problems, since her last visit with me for Physical in 2/2022 when she didn't have these issues. Will need evaluation and further recommendations,.    Thank you,  Luciana Franco MD on 6/27/2022 at 4:14 PM

## 2022-07-01 ENCOUNTER — OFFICE VISIT (OUTPATIENT)
Dept: FAMILY MEDICINE | Facility: CLINIC | Age: 47
End: 2022-07-01
Payer: COMMERCIAL

## 2022-07-01 VITALS
OXYGEN SATURATION: 97 % | TEMPERATURE: 97.5 F | WEIGHT: 280 LBS | HEART RATE: 71 BPM | RESPIRATION RATE: 18 BRPM | BODY MASS INDEX: 43.18 KG/M2 | DIASTOLIC BLOOD PRESSURE: 82 MMHG | SYSTOLIC BLOOD PRESSURE: 122 MMHG

## 2022-07-01 DIAGNOSIS — Z86.39 HISTORY OF HYPERPARATHYROIDISM: ICD-10-CM

## 2022-07-01 DIAGNOSIS — Z12.11 SCREEN FOR COLON CANCER: ICD-10-CM

## 2022-07-01 DIAGNOSIS — R60.0 PERIPHERAL EDEMA: ICD-10-CM

## 2022-07-01 DIAGNOSIS — R73.03 PREDIABETES: ICD-10-CM

## 2022-07-01 DIAGNOSIS — R00.1 BRADYCARDIA: ICD-10-CM

## 2022-07-01 DIAGNOSIS — R29.6 RECURRENT FALLS: Primary | ICD-10-CM

## 2022-07-01 DIAGNOSIS — E55.9 VITAMIN D DEFICIENCY: ICD-10-CM

## 2022-07-01 LAB
ERYTHROCYTE [DISTWIDTH] IN BLOOD BY AUTOMATED COUNT: 12.7 % (ref 10–15)
HBA1C MFR BLD: 5.8 % (ref 0–5.6)
HCT VFR BLD AUTO: 42.7 % (ref 35–47)
HGB BLD-MCNC: 14.1 G/DL (ref 11.7–15.7)
MCH RBC QN AUTO: 30.6 PG (ref 26.5–33)
MCHC RBC AUTO-ENTMCNC: 33 G/DL (ref 31.5–36.5)
MCV RBC AUTO: 93 FL (ref 78–100)
PLATELET # BLD AUTO: 226 10E3/UL (ref 150–450)
PTH-INTACT SERPL-MCNC: 49 PG/ML (ref 15–65)
RBC # BLD AUTO: 4.61 10E6/UL (ref 3.8–5.2)
WBC # BLD AUTO: 4.2 10E3/UL (ref 4–11)

## 2022-07-01 PROCEDURE — 93000 ELECTROCARDIOGRAM COMPLETE: CPT | Performed by: INTERNAL MEDICINE

## 2022-07-01 PROCEDURE — 99215 OFFICE O/P EST HI 40 MIN: CPT | Performed by: INTERNAL MEDICINE

## 2022-07-01 PROCEDURE — 83880 ASSAY OF NATRIURETIC PEPTIDE: CPT | Performed by: INTERNAL MEDICINE

## 2022-07-01 PROCEDURE — 83970 ASSAY OF PARATHORMONE: CPT | Performed by: INTERNAL MEDICINE

## 2022-07-01 PROCEDURE — 36415 COLL VENOUS BLD VENIPUNCTURE: CPT | Performed by: INTERNAL MEDICINE

## 2022-07-01 PROCEDURE — 85027 COMPLETE CBC AUTOMATED: CPT | Performed by: INTERNAL MEDICINE

## 2022-07-01 PROCEDURE — 83036 HEMOGLOBIN GLYCOSYLATED A1C: CPT | Performed by: INTERNAL MEDICINE

## 2022-07-01 PROCEDURE — 82306 VITAMIN D 25 HYDROXY: CPT | Performed by: INTERNAL MEDICINE

## 2022-07-01 PROCEDURE — 80053 COMPREHEN METABOLIC PANEL: CPT | Performed by: INTERNAL MEDICINE

## 2022-07-01 ASSESSMENT — PAIN SCALES - GENERAL: PAINLEVEL: NO PAIN (0)

## 2022-07-01 NOTE — PROGRESS NOTES
Assessment and Plan  1. Recurrent falls  New problem, which pt states that she has fallen down 4 times since past 6 months and wanting to understand why its happening. Denies and joint pains, Vertigo, Syncope but its random falls as she walks - she states.   Will do baseline labs along with EKG given pt peripheral edema which looks mildly worsening from the past. Will need to make sure , No Ear issues - check for Blood pressures , CMP , CBC  , EKG , A1C before next steps.   - Comprehensive metabolic panel (BMP + Alb, Alk Phos, ALT, AST, Total. Bili, TP); Future  - CBC with platelets; Future  - EKG 12-lead complete w/read - Clinics  - Comprehensive metabolic panel (BMP + Alb, Alk Phos, ALT, AST, Total. Bili, TP)  - CBC with platelets  - Echocardiogram Complete; Future    2. Peripheral edema  3. Bradycardia  UPDATE - EKG positive for bradycardia. Will check Echocardiogram and do further recommendations.   - EKG 12-lead complete w/read - Clinics  - BNP-N terminal pro; Future  - Echocardiogram Complete; Future    4. History of hyperparathyroidism  - Parathyroid Hormone Intact; Future  - Parathyroid Hormone Intact    5. Prediabetes  - Hemoglobin A1c; Future  - Hemoglobin A1c    6. Screen for colon cancer  - Fecal colorectal cancer screen FIT - Future (S+30); Future  - Fecal colorectal cancer screen FIT - Future (S+30)    7. Vitamin D deficiency  - Vitamin D deficiency screening; Future  - Vitamin D deficiency screening      Over 40 minutes spent on reviewing patient chart,  face to face encounter, greater than 50% time spent with plan/cordination of care and documentation as above in my A/P.         Patient Instructions   As discussed , your recurrent falls could be possibly related to metabolic causes which I would recheck at this time and make sure heart rhythm is normal given your swelling in the legs and falls.     Will do further recommendations after I see the reports.      =========================================        Return in about 3 months (around 10/1/2022), or if symptoms worsen or fail to improve, for If symptoms persist, Follow up of last visit.    Luciana Franco MD  [facility] ANGELA Carlos is a 47 year old, presenting for the following health issues:  Fall      Fall    History of Present Illness       Reason for visit:  Fall    She eats 4 or more servings of fruits and vegetables daily.She consumes 0 sweetened beverage(s) daily.She exercises with enough effort to increase her heart rate 10 to 19 minutes per day.  She exercises with enough effort to increase her heart rate 5 days per week.   She is taking medications regularly.     Last seen pt on 2/2022 for annual physical. CUrrently on losartan 25 mg daily. She is here for evaluation of recurrent falls due to unknown cause.      Concern - fall   Onset:  saturday   Description: pt fell while getting out of car   Intensity: mild  Progression of Symptoms:  improving  Accompanying Signs & Symptoms:   Previous history of similar problem: yes this has happen before   Precipitating factors:        Worsened by:   Alleviating factors:        Improved by:   Therapies tried and outcome:  none        Allergies   Allergen Reactions     Lisinopril      Cough     Tea Tree Oil         Past Medical History:   Diagnosis Date     Celiac disease      Hypertension        Past Surgical History:   Procedure Laterality Date     CHOLECYSTECTOMY         Family History   Problem Relation Age of Onset     Obesity Mother      Thyroid Disease Mother      Thyroid Disease Father      Breast Cancer Paternal Grandmother      Obesity Sister      Thyroid Disease Sister      Melanoma No family hx of      Skin Cancer No family hx of        Social History     Tobacco Use     Smoking status: Never Smoker     Smokeless tobacco: Never Used   Substance Use Topics     Alcohol use: No        Current Outpatient Medications    Medication     CETIRIZINE HCL PO     losartan (COZAAR) 25 MG tablet     No current facility-administered medications for this visit.        Review of Systems   Constitutional, HEENT, cardiovascular, pulmonary, GI, , musculoskeletal, neuro, skin, endocrine and psych systems are negative, except as otherwise noted.      Objective    /82   Pulse 71   Temp 97.5  F (36.4  C) (Tympanic)   Resp 18   Wt 127 kg (280 lb)   LMP 06/16/2022   SpO2 97%   BMI 43.18 kg/m    Body mass index is 43.18 kg/m .  Physical Exam   GENERAL: healthy, alert and no distress  ENT Exam : Ear canals and TM's normal, nose and mouth without ulcers or lesions, NO pharyngeal erythema, Cervical lymphadenopathy or Sinus tenderness on palpation.  NECK: no adenopathy, no asymmetry, masses, or scars and thyroid normal to palpation  RESP: lungs clear to auscultation - no rales, rhonchi or wheezes  CV: regular rate and rhythm, normal S1 S2, no S3 or S4, no murmur, click or rub,   ABDOMEN: soft, nontender, no hepatosplenomegaly, no masses and bowel sounds normal  MS: no gross musculoskeletal defects noted,non pitting peripheral edema  NEURO : CN 2-12 normal. No Motor or Sensory deficits.

## 2022-07-01 NOTE — TELEPHONE ENCOUNTER
Provider Recommendation Follow Up:   Unable to reach patient/caregiver. Left detailed message to return call to [phone]. Left detailed vm with message from Dr. Franco, see below.     Mariela MORRISON RN  [facility]

## 2022-07-01 NOTE — PATIENT INSTRUCTIONS
As discussed , your recurrent falls could be possibly related to metabolic causes which I would recheck at this time and make sure heart rhythm is normal given your swelling in the legs and falls.     Will do further recommendations after I see the reports.     =========================================

## 2022-07-02 LAB — DEPRECATED CALCIDIOL+CALCIFEROL SERPL-MC: 25 UG/L (ref 20–75)

## 2022-07-02 NOTE — RESULT ENCOUNTER NOTE
Your EKG is showing abnormality with low heart rate and given your recurrent falls , I have placed Echocardiogram orders. Please keep up the appointment for Ultrasound of the heart.     Your lab work is POSITIVE for Prediabetes. Please follow the diet below for improvement. Will need follow up on this.       Please let me know if you have any questions.  Luciana Franco MD on 7/2/2022

## 2022-07-03 LAB
ALBUMIN SERPL-MCNC: 3.8 G/DL (ref 3.4–5)
ALP SERPL-CCNC: 91 U/L (ref 40–150)
ALT SERPL W P-5'-P-CCNC: 20 U/L (ref 0–50)
ANION GAP SERPL CALCULATED.3IONS-SCNC: 6 MMOL/L (ref 3–14)
AST SERPL W P-5'-P-CCNC: 17 U/L (ref 0–45)
BILIRUB SERPL-MCNC: 0.3 MG/DL (ref 0.2–1.3)
BUN SERPL-MCNC: 16 MG/DL (ref 7–30)
CALCIUM SERPL-MCNC: 8.4 MG/DL (ref 8.5–10.1)
CHLORIDE BLD-SCNC: 111 MMOL/L (ref 94–109)
CO2 SERPL-SCNC: 22 MMOL/L (ref 20–32)
CREAT SERPL-MCNC: 0.5 MG/DL (ref 0.52–1.04)
GFR SERPL CREATININE-BSD FRML MDRD: >90 ML/MIN/1.73M2
GLUCOSE BLD-MCNC: 95 MG/DL (ref 70–99)
POTASSIUM BLD-SCNC: 4.5 MMOL/L (ref 3.4–5.3)
PROT SERPL-MCNC: 7.1 G/DL (ref 6.8–8.8)
SODIUM SERPL-SCNC: 139 MMOL/L (ref 133–144)

## 2022-07-04 LAB — NT-PROBNP SERPL-MCNC: 91 PG/ML (ref 0–125)

## 2022-07-15 PROCEDURE — 82274 ASSAY TEST FOR BLOOD FECAL: CPT | Performed by: INTERNAL MEDICINE

## 2022-07-18 LAB — HEMOCCULT STL QL IA: NEGATIVE

## 2022-07-19 NOTE — RESULT ENCOUNTER NOTE
Breanna-  Here are your recent results.       -FIT test (screening test for colon cancer) was normal. ADVISE: rechecking this test in 1 year.    If you have any questions please do not hesitate to contact our office via phone ([phone]) or you may send me a message via [employer] by clicking the contact my Care Team link.          Thank you,    Jose Armando Lugo MPH, PA-C  [address] [place] [address] [phone]

## 2022-08-05 ENCOUNTER — E-VISIT (OUTPATIENT)
Dept: FAMILY MEDICINE | Facility: CLINIC | Age: 47
End: 2022-08-05
Payer: COMMERCIAL

## 2022-08-05 ENCOUNTER — LAB (OUTPATIENT)
Dept: FAMILY MEDICINE | Facility: CLINIC | Age: 47
End: 2022-08-05
Attending: PHYSICIAN ASSISTANT
Payer: COMMERCIAL

## 2022-08-05 DIAGNOSIS — R07.0 THROAT PAIN: ICD-10-CM

## 2022-08-05 DIAGNOSIS — Z20.822 SUSPECTED COVID-19 VIRUS INFECTION: ICD-10-CM

## 2022-08-05 DIAGNOSIS — U07.1 INFECTION DUE TO 2019 NOVEL CORONAVIRUS: Primary | ICD-10-CM

## 2022-08-05 LAB
DEPRECATED S PYO AG THROAT QL EIA: NEGATIVE
GROUP A STREP BY PCR: NOT DETECTED
SARS-COV-2 RNA RESP QL NAA+PROBE: POSITIVE

## 2022-08-05 PROCEDURE — U0005 INFEC AGEN DETEC AMPLI PROBE: HCPCS

## 2022-08-05 PROCEDURE — 99422 OL DIG E/M SVC 11-20 MIN: CPT | Performed by: PHYSICIAN ASSISTANT

## 2022-08-05 PROCEDURE — 87651 STREP A DNA AMP PROBE: CPT

## 2022-08-05 PROCEDURE — U0003 INFECTIOUS AGENT DETECTION BY NUCLEIC ACID (DNA OR RNA); SEVERE ACUTE RESPIRATORY SYNDROME CORONAVIRUS 2 (SARS-COV-2) (CORONAVIRUS DISEASE [COVID-19]), AMPLIFIED PROBE TECHNIQUE, MAKING USE OF HIGH THROUGHPUT TECHNOLOGIES AS DESCRIBED BY CMS-2020-01-R: HCPCS

## 2022-08-05 NOTE — TELEPHONE ENCOUNTER
Pt has appt at [facility] to have strep swab done.    Breanna Lafleur,  [facility]

## 2022-08-05 NOTE — PATIENT INSTRUCTIONS
Thank you for choosing us for your care. The throat symptoms are most likely due to the COVID infection, but I have placed a Strep test order as requested. I will have my team reach out to you to schedule a time to come in for the test.    You do qualify for medication to help with the COVID symptoms. It's an anti-viral called Paxlovid. This doesn't cure COVID, but prevents the infection from getting worse and helps you get better faster. This has been sent to your pharmacy, please take as prescribed for the next 5 days. Continue to stay well hydrated and get plenty of rest. Paxlovid can cause an odd taste in your mouth approximately 30 minutes after you take the dose and it will typically resolve within 1 hour; using cough drops or sucking on hard candy can help mask this taste.

## 2022-08-05 NOTE — TELEPHONE ENCOUNTER
Provider E-Visit time total (minutes): 12 mins    Team - patient needs a Strep test ([place] only) appointment, please call to schedule. She is COVID+, will need to be rapid roomed and respiratory precautions used.

## 2022-08-21 ENCOUNTER — HOSPITAL ENCOUNTER (OUTPATIENT)
Dept: CARDIOLOGY | Facility: CLINIC | Age: 47
Discharge: HOME | End: 2022-08-21
Payer: COMMERCIAL

## 2022-08-21 DIAGNOSIS — R00.1 BRADYCARDIA: ICD-10-CM

## 2022-08-21 DIAGNOSIS — R60.0 PERIPHERAL EDEMA: ICD-10-CM

## 2022-08-21 DIAGNOSIS — R29.6 RECURRENT FALLS: ICD-10-CM

## 2022-08-21 LAB — LVEF ECHO: NORMAL

## 2022-08-21 PROCEDURE — 93306 TTE W/DOPPLER COMPLETE: CPT | Mod: 26 | Performed by: INTERNAL MEDICINE

## 2022-08-21 PROCEDURE — 93306 TTE W/DOPPLER COMPLETE: CPT

## 2022-10-09 ENCOUNTER — HEALTH MAINTENANCE LETTER (OUTPATIENT)
Age: 47
End: 2022-10-09

## 2022-12-21 DIAGNOSIS — I10 BENIGN ESSENTIAL HYPERTENSION: ICD-10-CM

## 2022-12-21 RX ORDER — LOSARTAN POTASSIUM 25 MG/1
25 TABLET ORAL DAILY
Qty: 90 TABLET | Refills: 1 | Status: SHIPPED | OUTPATIENT
Start: 2022-12-21 | End: 2023-03-28

## 2022-12-21 NOTE — TELEPHONE ENCOUNTER
Medication Question or Refill    Contacts       Type Contact Phone/Fax    12/21/2022 07:57 AM CST Fax (Incoming) "[facility] - [place], [place] - [address]  (Pharmacy) [phone]          What medication are you calling about (include dose and sig)?: losartan (COZAAR) 25 MG tablet    Controlled Substance Agreement on file:   CSA -- Patient Level:    CSA: None found at the patient level.       Who prescribed the medication?: Namballa    Do you need a refill? Yes:     When did you use the medication last? unknown    Patient offered an appointment? No    Do you have any questions or concerns?  No    Preferred Pharmacy:  "[facility] - [place], [place] - [address]   [address] [facility] [address]  Phone: [phone] Fax: [phone]      Soo Iglesias/[place]-  [facility]

## 2023-01-01 LAB — PAP SMEAR - HIM PATIENT REPORTED: NORMAL

## 2023-03-25 ENCOUNTER — HEALTH MAINTENANCE LETTER (OUTPATIENT)
Age: 48
End: 2023-03-25

## 2023-03-28 DIAGNOSIS — I10 BENIGN ESSENTIAL HYPERTENSION: ICD-10-CM

## 2023-03-28 RX ORDER — LOSARTAN POTASSIUM 25 MG/1
TABLET ORAL
Qty: 90 TABLET | Refills: 1 | Status: SHIPPED | OUTPATIENT
Start: 2023-03-28 | End: 2023-07-11

## 2023-03-30 ENCOUNTER — E-VISIT (OUTPATIENT)
Dept: FAMILY MEDICINE | Facility: CLINIC | Age: 48
End: 2023-03-30
Payer: COMMERCIAL

## 2023-03-30 DIAGNOSIS — T78.40XA ALLERGY, UNSPECIFIED, INITIAL ENCOUNTER: ICD-10-CM

## 2023-03-30 DIAGNOSIS — R22.0 SWOLLEN LIP: Primary | ICD-10-CM

## 2023-03-30 PROCEDURE — 99423 OL DIG E/M SVC 21+ MIN: CPT | Performed by: INTERNAL MEDICINE

## 2023-03-30 RX ORDER — TRIAMCINOLONE ACETONIDE 0.25 MG/G
OINTMENT TOPICAL 2 TIMES DAILY
Qty: 15 G | Refills: 1 | Status: SHIPPED | OUTPATIENT
Start: 2023-03-30 | End: 2024-03-20

## 2023-03-30 NOTE — TELEPHONE ENCOUNTER
Provider E-Visit time total (minutes):  22 minutes      Sent in ITM Power message as below -       Blayne Carlos,     I am sorry to hear that.   Appears possibly related to either food allergy or any kind of trigger if you are able to identify which could be causing this. If this is affecting any kind of breathing trouble or tongue swelling I would recommend to carry an Epipen to get immediate relief to combat that allergen which could have caused it. Please update me on these symptoms which I can decide on Epipen to be sent in if positive. And also ask for an allergy specialist on board to get into the more causative factor treatment.    If not , but you are only facing it on the lip skin, OK to take Benadryl if its working for you as needed along with a steroid cream to get this better . Please ensure it doesn't go inside your mouth and keep this only on the skin part on the affected swelling of the lip.     Please let me know if you have any questions.    Thank you,  Luciana Franco MD on 3/30/2023 at 12:42 PM

## 2023-04-07 ENCOUNTER — OFFICE VISIT (OUTPATIENT)
Dept: FAMILY MEDICINE | Facility: CLINIC | Age: 48
End: 2023-04-07
Payer: COMMERCIAL

## 2023-04-07 VITALS
RESPIRATION RATE: 14 BRPM | DIASTOLIC BLOOD PRESSURE: 86 MMHG | HEART RATE: 80 BPM | WEIGHT: 281 LBS | BODY MASS INDEX: 43.34 KG/M2 | SYSTOLIC BLOOD PRESSURE: 150 MMHG | TEMPERATURE: 97.6 F | OXYGEN SATURATION: 98 %

## 2023-04-07 DIAGNOSIS — K13.0 CHEILITIS: Primary | ICD-10-CM

## 2023-04-07 PROCEDURE — 99213 OFFICE O/P EST LOW 20 MIN: CPT | Performed by: PHYSICIAN ASSISTANT

## 2023-04-07 RX ORDER — CLOTRIMAZOLE 1 %
CREAM (GRAM) TOPICAL 2 TIMES DAILY
Qty: 30 G | Refills: 0 | Status: SHIPPED | OUTPATIENT
Start: 2023-04-07 | End: 2023-04-14

## 2023-04-07 RX ORDER — FLUCONAZOLE 150 MG/1
150 TABLET ORAL ONCE
Qty: 2 TABLET | Refills: 0 | Status: SHIPPED | OUTPATIENT
Start: 2023-04-07 | End: 2023-04-07

## 2023-04-07 NOTE — PROGRESS NOTES
Patient presents with:  Rash: Off and on rash on lips. Itchy. Swollen.      Clinical Decision Making:  Patient had an E-visit that was treated with perioral dermatitis with topical triamcinolone and Aquaphor.  This has made the symptoms worse.  She has not had good resolution.  Discontinue triamcinolone and Aquaphor patient will be treated with topical Lotrimin and Diflucan for cheilitis. Expected course of resolution and indication for return was gone over and questions were answered to patient/parent's satisfaction before discharge.        ICD-10-CM    1. Cheilitis  K13.0 clotrimazole (LOTRIMIN) 1 % external cream     fluconazole (DIFLUCAN) 150 MG tablet          Patient Instructions   Apply the topical antibiotic to the lips  Take the internal antibiotic as prescribed once every 3 days  Return to see your primary care provider if not getting good resolution or if new symptoms or concerns arise          HPI:  Breanna Fischer is a 47 year old female who presents today for reevaluation of red swollen tender painful lips.  Patient had an ED visit that was treated with perioral dermatitis with triamcinolone and Eucerin Aquaphor.  Patient has had no improvement of her symptoms and slight worsening of the swelling and burning.  Patient is returning to clinic today for evaluation and treatment.  There is no constitutional symptoms to include fever chills night sweats or fatigue.     History obtained from chart review and the patient.    Problem List:  2022-02: Benign essential hypertension  2022-02: Celiac disease  2021-11: Prediabetes  2021-11: Low vitamin D level  2018-08: Morbid obesity (H)      Past Medical History:   Diagnosis Date     Celiac disease      Hypertension        Social History     Tobacco Use     Smoking status: Never     Smokeless tobacco: Never   Vaping Use     Vaping status: Not on file   Substance Use Topics     Alcohol use: No       Review of Systems  As above in HPI otherwise negative.    Vitals:     04/07/23 1454   BP: (!) 150/86   Pulse: 80   Resp: 14   Temp: 97.6  F (36.4  C)   TempSrc: Oral   SpO2: 98%   Weight: 127.5 kg (281 lb)       General: Patient is resting comfortably no acute distress is afebrile  HEENT: Head is normocephalic atraumatic   eyes are PERRL EOMI sclera anicteric   Mouth: No involvement of the buccal mucosa of the oral mucous membranes or soft palate  The upper and lower lips are with erythema and slight edema.  Peers to be consistent with cheilitis.  There is no cracking or skin breakdown at this time  Skin: Without rash non-diaphoretic    Physical Exam    At the end of the encounter, I discussed results, diagnosis, medications. Discussed red flags for immediate return to clinic/ER, as well as indications for follow up if no improvement. Patient understood and agreed to plan. Patient was stable for discharge.

## 2023-04-07 NOTE — PATIENT INSTRUCTIONS
Apply the topical antibiotic to the lips  Take the internal antibiotic as prescribed once every 3 days  Return to see your primary care provider if not getting good resolution or if new symptoms or concerns arise

## 2023-05-21 ENCOUNTER — OFFICE VISIT (OUTPATIENT)
Dept: FAMILY MEDICINE | Facility: CLINIC | Age: 48
End: 2023-05-21
Payer: COMMERCIAL

## 2023-05-21 VITALS
RESPIRATION RATE: 18 BRPM | OXYGEN SATURATION: 98 % | TEMPERATURE: 98.2 F | DIASTOLIC BLOOD PRESSURE: 79 MMHG | SYSTOLIC BLOOD PRESSURE: 127 MMHG | HEART RATE: 70 BPM

## 2023-05-21 DIAGNOSIS — K13.0 CHEILITIS: Primary | ICD-10-CM

## 2023-05-21 PROCEDURE — 99214 OFFICE O/P EST MOD 30 MIN: CPT | Performed by: PHYSICIAN ASSISTANT

## 2023-05-21 NOTE — PATIENT INSTRUCTIONS
Apply previously prescribed Clotrimazole and Triamcinolone mixed together to lips BID. Continue to use Aquaphor for comfort.     Increase Zyrtec dosing to 10mg twice daily.    Follow up with dermatology.

## 2023-05-21 NOTE — PROGRESS NOTES
"Patient presents with:  Mouth/Lip Problem: Lips swollen and red, third episode this year.      Clinical Decision Making:    Ddx: Cheilitis. Possible AUDREY vs malabsorption issue as root cause of cheilitis, however patient states that she is strict with her gluten-free diet and eats high-protein, low-carb diet with plenty of fresh vegetables. Was most recently with Flagyl. Recommend applying previously prescribed Clotrimazole and Triamcinolone mixed together to lips BID and continuing to use Aquaphor for comfort. Also recommend increasing her Zyrtec dosing to 10mg BID. If her symptoms persist recommend follow up with dermatology.    Also question allergic reaction, however symptoms are isolated to lips, no other allergic symptoms such as red-eyes, rhinorrhea, etc. Could consider a referral to an allergy specialist as well.       ICD-10-CM    1. Cheilitis  K13.0 Adult Dermatology Referral          Patient Instructions   Apply previously prescribed Clotrimazole and Triamcinolone mixed together to lips BID. Continue to use Aquaphor for comfort.     Increase Zyrtec dosing to 10mg twice daily.    Follow up with dermatology.      HPI:  Breanna Fischer is a 47 year old female who presents today complaining of swollen lips. Third time this year. Dx with chelitis last month. Took oral steroids form her sister and was prescribed Flagyl by Urgent Care. This helped, but symptoms never completely resolved symptoms. 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 Whenever she eats or drinks anything she gets a line around her lip. No specific triggers isolated. She has a lot of environmental allergies including dust, grass, cats. She has taken a daily Zyrtec for 15 years. Took Benadryl throughout the day yesterday. Regular Benadryl did help but is not sustainable, she is \"swimmy\" from Benadryl on/off nausea as well. Specifically " Benadryl helped with soreness, stinging of lips, but they were still red and symptoms completely returned when she missed doses overnight (while sleeping). She does have sensitive skin at baseline. No other rashes currently. No changes to personal products (lotions, detergents, etc.). Did stop using Carmax about a month ago. Now uses Aquaphor and A&D on lips exclusively.       She does endorse stress from aging parents, decreased hydration, missing Zyrtec dose here and there and notes that she does appreciate more swelling and discomfort when her self-care is not optimized. She feels as though there are many factors that are contributing to her symptoms.   Sensitive skin.    History obtained from the patient.    Problem List:  2022-02: Benign essential hypertension  2022-02: Celiac disease  2021-11: Prediabetes  2021-11: Low vitamin D level  2018-08: Morbid obesity (H)      Past Medical History:   Diagnosis Date     Celiac disease      Hypertension        Social History     Tobacco Use     Smoking status: Never     Smokeless tobacco: Never   Vaping Use     Vaping status: Not on file   Substance Use Topics     Alcohol use: No       Review of Systems   HENT: Negative for congestion.        Vitals:    05/21/23 1102   BP: 127/79   Pulse: 70   Resp: 18   Temp: 98.2  F (36.8  C)   TempSrc: Tympanic   SpO2: 98%       Physical Exam  Vitals and nursing note reviewed.   Constitutional:       General: She is not in acute distress.     Appearance: She is not toxic-appearing or diaphoretic.   HENT:      Head: Normocephalic and atraumatic.      Right Ear: External ear normal.      Left Ear: External ear normal.      Mouth/Throat:      Comments: Perioral erythema extending approximately 3mm around vermilion boarder. Lip erythema and edema as well. No open areas. No crusting.   Eyes:      Conjunctiva/sclera: Conjunctivae normal.   Cardiovascular:      Heart sounds: No murmur heard.  Pulmonary:      Effort: Pulmonary effort is  normal. No respiratory distress.   Neurological:      Mental Status: She is alert.   Psychiatric:         Mood and Affect: Mood normal.         Behavior: Behavior normal.         Thought Content: Thought content normal.         Judgment: Judgment normal.         At the end of the encounter, I discussed results, diagnosis, medications. Discussed red flags for immediate return to clinic/ER, as well as indications for follow up if no improvement. Patient understood and agreed to plan. Patient was stable for discharge.

## 2023-05-31 ENCOUNTER — VIRTUAL VISIT (OUTPATIENT)
Dept: FAMILY MEDICINE | Facility: CLINIC | Age: 48
End: 2023-05-31
Payer: COMMERCIAL

## 2023-05-31 DIAGNOSIS — E66.01 MORBID OBESITY (H): ICD-10-CM

## 2023-05-31 DIAGNOSIS — R73.03 PREDIABETES: Primary | ICD-10-CM

## 2023-05-31 PROCEDURE — 99214 OFFICE O/P EST MOD 30 MIN: CPT | Mod: VID | Performed by: INTERNAL MEDICINE

## 2023-05-31 NOTE — PATIENT INSTRUCTIONS
As discussed , please check the A1c ordered before you start off on the Ozempic sent to your pharmacy with a starting dose of 0.25 mg q. weekly.

## 2023-05-31 NOTE — PROGRESS NOTES
Breanna is a 47 year old who is being evaluated via a billable video visit.      How would you like to obtain your AVS? MyChart  If the video visit is dropped, the invitation should be resent by: Text to cell phone: [phone]  Will anyone else be joining your video visit? No    Assessment and Plan  1. Morbid obesity (H)  Ongoing problem, uncontrolled.  Patient has already tried phentermine and metformin in the past and failed as she was unable to tolerate it.  Most part of this appointment have been discussing on different options including Ozempic which as per the shared decision patient would like to try Ozempic once weekly.  Discussed on the insurance coverage issues will sometimes if unable to cover then patient is willing to try other oral therapies offered including Contrave or orlistat at that time.  - Discussed on the side effects and risk factors of Ozempic which she understood and agreed with plan.  Please see AVS below for further details.  - semaglutide (OZEMPIC) 2 MG/3ML pen; Inject 0.25 mg Subcutaneous every 7 days  Dispense: 3 mL; Refill: 1    2. Prediabetes  Last A1c check in July 2022 was showing 5.8% mildly improved compared to the past of 6.2 in the past.  We will recheck at this time to make sure there is no progression to diabetes.  - Hemoglobin A1c; Future         Please note that this note consists of symbols derived from keyboarding, dictation and/or voice recognition software. As a result, there may be errors in the script that have gone undetected. Please consider this when interpreting information found in this chart.    Patient Instructions   As discussed , please check the A1c ordered before you start off on the Ozempic sent to your pharmacy with a starting dose of 0.25 mg q. weekly.        Return in about 6 weeks (around 7/11/2023), or if symptoms worsen or fail to improve, for Preventative Visit.    Luciana Franco MD  [facility] ANGELA Carlos is a  47 year old, presenting for the following health issues:  No chief complaint on file.         View : No data to display.              History of Present Illness       Reason for visit:  Weight loss    She eats 4 or more servings of fruits and vegetables daily.She consumes 0 sweetened beverage(s) daily.She exercises with enough effort to increase her heart rate 20 to 29 minutes per day.  She exercises with enough effort to increase her heart rate 5 days per week. She is missing 1 dose(s) of medications per week.  She is not taking prescribed medications regularly due to remembering to take.     Last seen pt on 12/2022 for recurrent fall injuries , peripheral edema and all the work up negative. She is here for weight loss medication options. Her current weight as of 5/21/23 was 281 lbs. Will consider various options        Allergies   Allergen Reactions     Lisinopril      Cough     Tea Tree Oil         Past Medical History:   Diagnosis Date     Celiac disease      Hypertension        Past Surgical History:   Procedure Laterality Date     CHOLECYSTECTOMY         Family History   Problem Relation Age of Onset     Obesity Mother      Thyroid Disease Mother      Thyroid Disease Father      Breast Cancer Paternal Grandmother      Obesity Sister      Thyroid Disease Sister      Melanoma No family hx of      Skin Cancer No family hx of        Social History     Tobacco Use     Smoking status: Never     Smokeless tobacco: Never   Vaping Use     Vaping status: Not on file   Substance Use Topics     Alcohol use: No        Current Outpatient Medications   Medication     semaglutide (OZEMPIC) 2 MG/3ML pen     CETIRIZINE HCL PO     losartan (COZAAR) 25 MG tablet     triamcinolone (KENALOG) 0.025 % external ointment     No current facility-administered medications for this visit.        Review of Systems   Constitutional, HEENT, cardiovascular, pulmonary, GI, , musculoskeletal, neuro, skin, endocrine and psych systems are  negative, except as otherwise noted.      Objective           Vitals:  No vitals were obtained today due to virtual visit.    Physical Exam   GENERAL: Healthy, alert and no distress  EYES: Eyes grossly normal to inspection.  No discharge or erythema, or obvious scleral/conjunctival abnormalities.  RESP: No audible wheeze, cough, or visible cyanosis.  No visible retractions or increased work of breathing.    SKIN: Visible skin clear. No significant rash, abnormal pigmentation or lesions.  NEURO: Cranial nerves grossly intact.  Mentation and speech appropriate for age.  PSYCH: Mentation appears normal, affect normal/bright, judgement and insight intact, normal speech and appearance well-groomed.      Video-Visit Details    Type of service:  Video Visit     Originating Location (pt. Location): Home  Distant Location (provider location):  On-site  Platform used for Video Visit: [employer]

## 2023-06-01 ENCOUNTER — TELEPHONE (OUTPATIENT)
Dept: FAMILY MEDICINE | Facility: CLINIC | Age: 48
End: 2023-06-01

## 2023-06-01 ENCOUNTER — LAB (OUTPATIENT)
Dept: LAB | Facility: CLINIC | Age: 48
End: 2023-06-01
Payer: COMMERCIAL

## 2023-06-01 DIAGNOSIS — R73.03 PREDIABETES: ICD-10-CM

## 2023-06-01 LAB — HBA1C MFR BLD: 5.9 % (ref 0–5.6)

## 2023-06-01 PROCEDURE — 36415 COLL VENOUS BLD VENIPUNCTURE: CPT

## 2023-06-01 PROCEDURE — 83036 HEMOGLOBIN GLYCOSYLATED A1C: CPT

## 2023-06-01 NOTE — TELEPHONE ENCOUNTER
Prior Authorization Retail Medication Request    Medication/Dose: semaglutide (OZEMPIC) 2 MG/3ML pen  ICD code (if different than what is on RX):    Previously Tried and Failed:    Rationale:      Insurance Name:  na  Insurance ID:  na      Pharmacy Information (if different than what is on RX)  Name:  same  Phone:  same

## 2023-06-02 ENCOUNTER — MYC MEDICAL ADVICE (OUTPATIENT)
Dept: FAMILY MEDICINE | Facility: CLINIC | Age: 48
End: 2023-06-02

## 2023-06-02 DIAGNOSIS — E66.01 MORBID OBESITY (H): Primary | ICD-10-CM

## 2023-06-02 DIAGNOSIS — R73.03 PREDIABETES: ICD-10-CM

## 2023-06-02 NOTE — TELEPHONE ENCOUNTER
Provider, please read the patient my chart message and advise.     Demetrice Irwin RN  [facility]

## 2023-06-02 NOTE — RESULT ENCOUNTER NOTE
"Your lab work is POSITIVE for Prediabetes. Please follow the diet below for improvement. Will need follow up on this.     American Diabetes Association (ADA) nutritional guidelines, which do not give specific total dietary compositional targets except for the following recommendations [1] that are in large part similar to the recommendations for the general population (see \"Healthy diet in adults\"):  ?A diet that includes carbohydrates from fruits, vegetables, whole grains, legumes, and low-fat milk is encouraged.  The ideal amount of carbohydrate intake is uncertain. However, monitoring carbohydrate intake (carbohydrate counting or experience-based estimation) is important in patients with diabetes, as carbohydrate intake directly determines postprandial blood glucose, and appropriate insulin adjustment for identified quantities of carbohydrate is one of the most important factors that can improve glycemic control.  ?A variety of eating patterns (Mediterranean, low fat, low carbohydrate, vegetarian) are acceptable.  ?Fat quality is more important than fat quantity. Trans fats contribute to coronary heart disease, while mono- and polyunsaturated fats (eg, those found in fish, olive oil, nuts) are relatively protective. Saturated fatty acids and different food sources of saturated fat have divergent effects on cardiovascular and metabolic health. Trans fatty acid consumption should be kept as low as possible.  ?Protein intake goals should be individualized but not lower than 0.8 g/kg body weight per day (the recommended daily allowance). Patients should be encouraged to substitute lean meats, fish, eggs, beans, peas, soy products, and nuts and seeds for red meat.  An automatic reduction of dietary protein intake (eg, 15 to 19 percent of calories) below usual protein intake in patients who develop diabetic kidney disease is not recommended. The role of dietary protein restriction is uncertain, particularly in view of " problems with compliance in patients already being treated with saturated fat and simple carbohydrate restriction. Furthermore, it is uncertain if a low-protein diet is significantly additive to other measures aimed at reducing cardiovascular risk and preserving renal function, such as angiotensin-converting enzyme (ACE) inhibition and aggressive control of blood pressure and blood glucose.  The usual daily intake of protein should be approximately 10 to 20 percent of total caloric intake. Higher levels of dietary protein intake (>20 percent of calories from protein or >1.3 g/kg/day) have been associated with increased albuminuria, more rapid kidney function loss, and cardiovascular disease (CVD) mortality and therefore should be avoided [69].  ?Fiber intake should be at least 14 grams per 1000 calories daily; higher fiber intake may improve glycemic control.  ?A reduced sodium intake of 2300 mg per day with a diet high in fruits, vegetables, and low-fat dairy products is prudent and has demonstrated beneficial effects on blood pressure.  ?Sugar-sweetened beverages should be avoided in order to control glycemia, weight, and reduce risk for CVD and fatty liver. Consumption of foods with added sugar that have the capacity to displace healthier, more nutrient-dense food choices should be minimized. Care should be taken to avoid excess calories from sucrose; however, foods containing sucrose may be substituted for other carbohydrates or covered with insulin or insulin secretagogue medications.  ?Sugar alcohols and non-nutritive sweeteners are safe when consumed within daily levels established by the US Food and Drug Administration (FDA). When calculating carbohydrate content of foods, one-half of the sugar alcohol content can be counted in the total carbohydrate content of the food. Use of sugar alcohols needs to be balanced with their potential to cause gastrointestinal side effects in sensitive  individuals.        AVOID THESE FOODS WITH HIGH GLYCEMIC INDEX      Dietary glycemic indices and glycemic load for the top 20 carbohydrate-contributing foods in the Nurses' Health Study in 1984  Foods Glycemic index*, % Carbohydrate per serving, g Glycemic load per serving  1. Cooked potatoes (mashed or baked)  102 37 38  2. White bread 100 13 13  3. Cold breakfast cereal Varies by cereal Varies by cereal Varies by cereal  4. Dark bread 102 12 12  5. Orange juice 75 20 15  6. Banana 88 27 24  7. White rice 102 45 46  8. Pizza 86 78 68  9. Pasta 71 40 28  10. English muffins 84 26 22  11. Fruit punch 95 44 42  12. Cola 90 39 35  13. Apple 55 21 12  14. Skim milk 46 11 5  15. Pancake 119 56 67  16. Table sugar 84 4 3  17. Jam 91 13 12  18. Cranberry juice 105 19 20  19. French fries 95 35 33  20. Candy 99 28 28    Please let me know if you have any questions.  Luciana Franco MD on 6/1/2023

## 2023-06-07 NOTE — TELEPHONE ENCOUNTER
Central Prior Authorization Team   Phone: [phone]    PA Initiation    Medication: semaglutide (OZEMPIC) 2 MG/3ML pen  Insurance Company: [employer] - Phone [phone] Fax [phone]  Pharmacy Filling the Rx: [id number] - [place], [place] - [address] [place] [address]  Filling Pharmacy Phone: [phone]  Filling Pharmacy Fax:    Start Date: 6/7/2023

## 2023-06-08 NOTE — TELEPHONE ENCOUNTER
PRIOR AUTHORIZATION DENIED    Medication: semaglutide (OZEMPIC) 2 MG/3ML pen-PA DENIED     Denial Date: 6/8/2023    Denial Rational:             Appeal Information:

## 2023-06-10 ENCOUNTER — HEALTH MAINTENANCE LETTER (OUTPATIENT)
Age: 48
End: 2023-06-10

## 2023-06-13 NOTE — TELEPHONE ENCOUNTER
Patient Contact    Attempt # 1    Was call answered?  No.  Left message on voicemail with information to call me back.    Ozempic PA was denied. Next step is weight management referral, their number is: [phone].

## 2023-06-15 NOTE — TELEPHONE ENCOUNTER
Patient Contact    Attempt # 2 and 3    Was call answered?  No.  Left message on voicemail with information to call triage back at [phone], option 2.  MC message sent with referral info below    On call back:   Relay info:     Ozempic PA was denied. Next step is weight management referral, their number is: [phone].      Lin FRANCES RN  [facility] Triage Team

## 2023-06-22 ENCOUNTER — MYC MEDICAL ADVICE (OUTPATIENT)
Dept: DERMATOLOGY | Facility: CLINIC | Age: 48
End: 2023-06-22

## 2023-06-22 NOTE — TELEPHONE ENCOUNTER
"From patient at derm email.  \"Ricarda, thanks for the solution to get photos in. These are from June 6th. No significant problems since then. The first pic is a recent flare that responded within an hour to 800mg ibuprofen.     Breanna Fischer\"  "

## 2023-06-22 NOTE — TELEPHONE ENCOUNTER
[employer] [facility] and attempted to reach patient x 2. No callback received. Writer will cancel appointment    Tereza Lamas LPN

## 2023-07-10 ASSESSMENT — ENCOUNTER SYMPTOMS
HEMATURIA: 0
WEAKNESS: 0
FREQUENCY: 0
DYSURIA: 0
ARTHRALGIAS: 0
DIZZINESS: 0
BREAST MASS: 0
CONSTIPATION: 0
SORE THROAT: 0
HEARTBURN: 0
HEADACHES: 0
CHILLS: 0
ABDOMINAL PAIN: 0
COUGH: 0
NERVOUS/ANXIOUS: 0
FEVER: 0
MYALGIAS: 0
SHORTNESS OF BREATH: 0
EYE PAIN: 0
DIARRHEA: 0
HEMATOCHEZIA: 0
JOINT SWELLING: 1
NAUSEA: 0
PALPITATIONS: 0
PARESTHESIAS: 0

## 2023-07-11 ENCOUNTER — OFFICE VISIT (OUTPATIENT)
Dept: FAMILY MEDICINE | Facility: CLINIC | Age: 48
End: 2023-07-11
Payer: COMMERCIAL

## 2023-07-11 ENCOUNTER — LAB (OUTPATIENT)
Dept: FAMILY MEDICINE | Facility: CLINIC | Age: 48
End: 2023-07-11

## 2023-07-11 VITALS
BODY MASS INDEX: 44.57 KG/M2 | HEIGHT: 67 IN | DIASTOLIC BLOOD PRESSURE: 84 MMHG | SYSTOLIC BLOOD PRESSURE: 124 MMHG | WEIGHT: 284 LBS | OXYGEN SATURATION: 99 % | HEART RATE: 70 BPM | TEMPERATURE: 97 F

## 2023-07-11 DIAGNOSIS — Z00.00 ROUTINE GENERAL MEDICAL EXAMINATION AT A HEALTH CARE FACILITY: Primary | ICD-10-CM

## 2023-07-11 DIAGNOSIS — Z23 NEED FOR VACCINATION: ICD-10-CM

## 2023-07-11 DIAGNOSIS — Z00.00 ROUTINE GENERAL MEDICAL EXAMINATION AT A HEALTH CARE FACILITY: ICD-10-CM

## 2023-07-11 DIAGNOSIS — Z12.31 VISIT FOR SCREENING MAMMOGRAM: ICD-10-CM

## 2023-07-11 DIAGNOSIS — Z12.11 SCREEN FOR COLON CANCER: ICD-10-CM

## 2023-07-11 DIAGNOSIS — R79.89 LOW VITAMIN D LEVEL: ICD-10-CM

## 2023-07-11 DIAGNOSIS — I10 BENIGN ESSENTIAL HYPERTENSION: ICD-10-CM

## 2023-07-11 DIAGNOSIS — E66.01 MORBID OBESITY (H): ICD-10-CM

## 2023-07-11 DIAGNOSIS — K90.0 CELIAC DISEASE: ICD-10-CM

## 2023-07-11 DIAGNOSIS — R60.0 PERIPHERAL EDEMA: ICD-10-CM

## 2023-07-11 LAB
ALBUMIN SERPL BCG-MCNC: 4.4 G/DL (ref 3.5–5.2)
ALP SERPL-CCNC: 82 U/L (ref 35–104)
ALT SERPL W P-5'-P-CCNC: 16 U/L (ref 0–50)
ANION GAP SERPL CALCULATED.3IONS-SCNC: 10 MMOL/L (ref 7–15)
AST SERPL W P-5'-P-CCNC: 21 U/L (ref 0–45)
BILIRUB SERPL-MCNC: 0.3 MG/DL
BUN SERPL-MCNC: 19.1 MG/DL (ref 6–20)
CALCIUM SERPL-MCNC: 8.9 MG/DL (ref 8.6–10)
CHLORIDE SERPL-SCNC: 108 MMOL/L (ref 98–107)
CHOLEST SERPL-MCNC: 151 MG/DL
CREAT SERPL-MCNC: 0.56 MG/DL (ref 0.51–0.95)
DEPRECATED HCO3 PLAS-SCNC: 21 MMOL/L (ref 22–29)
ERYTHROCYTE [DISTWIDTH] IN BLOOD BY AUTOMATED COUNT: 12 % (ref 10–15)
GFR SERPL CREATININE-BSD FRML MDRD: >90 ML/MIN/1.73M2
GLUCOSE SERPL-MCNC: 108 MG/DL (ref 70–99)
HCT VFR BLD AUTO: 43.6 % (ref 35–47)
HDLC SERPL-MCNC: 40 MG/DL
HGB BLD-MCNC: 14.5 G/DL (ref 11.7–15.7)
LDLC SERPL CALC-MCNC: 89 MG/DL
MCH RBC QN AUTO: 30.9 PG (ref 26.5–33)
MCHC RBC AUTO-ENTMCNC: 33.3 G/DL (ref 31.5–36.5)
MCV RBC AUTO: 93 FL (ref 78–100)
NONHDLC SERPL-MCNC: 111 MG/DL
NT-PROBNP SERPL-MCNC: 57 PG/ML (ref 0–900)
PLATELET # BLD AUTO: 217 10E3/UL (ref 150–450)
POTASSIUM SERPL-SCNC: 4.3 MMOL/L (ref 3.4–5.3)
PROT SERPL-MCNC: 7.1 G/DL (ref 6.4–8.3)
RBC # BLD AUTO: 4.69 10E6/UL (ref 3.8–5.2)
SODIUM SERPL-SCNC: 139 MMOL/L (ref 136–145)
TRIGL SERPL-MCNC: 109 MG/DL
TSH SERPL DL<=0.005 MIU/L-ACNC: 1.37 UIU/ML (ref 0.3–4.2)
WBC # BLD AUTO: 4.7 10E3/UL (ref 4–11)

## 2023-07-11 PROCEDURE — 83880 ASSAY OF NATRIURETIC PEPTIDE: CPT | Performed by: INTERNAL MEDICINE

## 2023-07-11 PROCEDURE — 90472 IMMUNIZATION ADMIN EACH ADD: CPT | Performed by: INTERNAL MEDICINE

## 2023-07-11 PROCEDURE — 84443 ASSAY THYROID STIM HORMONE: CPT | Performed by: INTERNAL MEDICINE

## 2023-07-11 PROCEDURE — 90714 TD VACC NO PRESV 7 YRS+ IM: CPT | Performed by: INTERNAL MEDICINE

## 2023-07-11 PROCEDURE — 99396 PREV VISIT EST AGE 40-64: CPT | Mod: 25 | Performed by: INTERNAL MEDICINE

## 2023-07-11 PROCEDURE — 80061 LIPID PANEL: CPT | Performed by: INTERNAL MEDICINE

## 2023-07-11 PROCEDURE — 80053 COMPREHEN METABOLIC PANEL: CPT | Performed by: INTERNAL MEDICINE

## 2023-07-11 PROCEDURE — 36415 COLL VENOUS BLD VENIPUNCTURE: CPT | Performed by: INTERNAL MEDICINE

## 2023-07-11 PROCEDURE — 90750 HZV VACC RECOMBINANT IM: CPT | Performed by: INTERNAL MEDICINE

## 2023-07-11 PROCEDURE — 82306 VITAMIN D 25 HYDROXY: CPT | Performed by: INTERNAL MEDICINE

## 2023-07-11 PROCEDURE — 90471 IMMUNIZATION ADMIN: CPT | Performed by: INTERNAL MEDICINE

## 2023-07-11 PROCEDURE — 99214 OFFICE O/P EST MOD 30 MIN: CPT | Mod: 25 | Performed by: INTERNAL MEDICINE

## 2023-07-11 PROCEDURE — 85027 COMPLETE CBC AUTOMATED: CPT | Performed by: INTERNAL MEDICINE

## 2023-07-11 RX ORDER — LOSARTAN POTASSIUM 25 MG/1
25 TABLET ORAL DAILY
Qty: 90 TABLET | Refills: 3 | Status: SHIPPED | OUTPATIENT
Start: 2023-07-11 | End: 2024-01-03

## 2023-07-11 ASSESSMENT — ENCOUNTER SYMPTOMS
PARESTHESIAS: 0
SORE THROAT: 0
CHILLS: 0
HEADACHES: 0
EYE PAIN: 0
DIZZINESS: 0
HEARTBURN: 0
BREAST MASS: 0
FEVER: 0
NAUSEA: 0
WEAKNESS: 0
DIARRHEA: 0
COUGH: 0
ARTHRALGIAS: 0
HEMATOCHEZIA: 0
HEMATURIA: 0
JOINT SWELLING: 1
PALPITATIONS: 0
CONSTIPATION: 0
FREQUENCY: 0
DYSURIA: 0
NERVOUS/ANXIOUS: 0
SHORTNESS OF BREATH: 0
ABDOMINAL PAIN: 0
MYALGIAS: 0

## 2023-07-11 ASSESSMENT — PAIN SCALES - GENERAL: PAINLEVEL: NO PAIN (0)

## 2023-07-11 NOTE — PROGRESS NOTES
SUBJECTIVE:   CC: Breanna is an 48 year old who presents for preventive health visit.        No data to display              Healthy Habits:     Getting at least 3 servings of Calcium per day:  Yes    Bi-annual eye exam:  Yes    Dental care twice a year:  Yes    Sleep apnea or symptoms of sleep apnea:  None    Diet:  Gluten-free/reduced    Frequency of exercise:  4-5 days/week    Duration of exercise:  15-30 minutes    Taking medications regularly:  Yes    Medication side effects:  Not applicable    Additional concerns today:  No      Today's PHQ-2 Score:       7/10/2023    11:13 PM   PHQ-2 (  [employer])   Q1: Little interest or pleasure in doing things 0   Q2: Feeling down, depressed or hopeless 0   PHQ-2 Score 0   Q1: Little interest or pleasure in doing things Not at all   Q2: Feeling down, depressed or hopeless Not at all   PHQ-2 Score 0       Social History     Tobacco Use     Smoking status: Never     Smokeless tobacco: Never   Substance Use Topics     Alcohol use: No             7/10/2023    11:13 PM   Alcohol Use   Prescreen: >3 drinks/day or >7 drinks/week? No          No data to display              Reviewed orders with patient.  Reviewed health maintenance and updated orders accordingly - Yes  Lab work is in process  Labs reviewed in EPIC    Breast Cancer Screenin/6/2022     5:32 PM 7/10/2023    11:13 PM   Breast CA Risk Assessment (FHS-7)   Do you have a family history of breast, colon, or ovarian cancer? Yes No / Unknown       click delete button to remove this line now  Mammogram Screening: Recommended annual mammography  Pertinent mammograms are reviewed under the imaging tab.    History of abnormal Pap smear: NO - age 30- 65 PAP every 3 years recommended      Latest Ref Rng & Units 2022     9:21 AM   PAP / HPV   PAP  Negative for Intraepithelial Lesion or Malignancy (NILM)    HPV 16 DNA Negative Negative    HPV 18 DNA Negative Negative    Other HR HPV Negative Negative      Reviewed  and updated as needed this visit by clinical staff   Tobacco  Allergies  Meds  Problems  Med Hx  Surg Hx  Fam Hx          Reviewed and updated as needed this visit by Provider   Tobacco  Allergies  Meds  Problems  Med Hx  Surg Hx  Fam Hx         Past Medical History:   Diagnosis Date     Celiac disease      Hypertension       Past Surgical History:   Procedure Laterality Date     ABDOMEN SURGERY      Gall bladder     CHOLECYSTECTOMY       ENT SURGERY       OB History    Para Term  AB Living   3 0 0 0 0 3   SAB IAB Ectopic Multiple Live Births   0 0 0 0 0      # Outcome Date GA Lbr Yvon/2nd Weight Sex Delivery Anes PTL Lv   3             2             1                 Review of Systems   Constitutional: Negative for chills and fever.   HENT: Negative for congestion, ear pain, hearing loss and sore throat.    Eyes: Negative for pain and visual disturbance.   Respiratory: Negative for cough and shortness of breath.    Cardiovascular: Negative for chest pain, palpitations and peripheral edema.   Gastrointestinal: Negative for abdominal pain, constipation, diarrhea, heartburn, hematochezia and nausea.   Breasts:  Negative for tenderness, breast mass and discharge.   Genitourinary: Negative for dysuria, frequency, genital sores, hematuria, pelvic pain, urgency, vaginal bleeding and vaginal discharge.   Musculoskeletal: Positive for joint swelling. Negative for arthralgias and myalgias.   Skin: Negative for rash.   Neurological: Negative for dizziness, weakness, headaches and paresthesias.   Psychiatric/Behavioral: Negative for mood changes. The patient is not nervous/anxious.      CONSTITUTIONAL: NEGATIVE for fever, chills, change in weight  INTEGUMENTARY/SKIN: NEGATIVE for worrisome rashes, moles or lesions  EYES: NEGATIVE for vision changes or irritation  ENT: NEGATIVE for ear, mouth and throat problems  RESP: NEGATIVE for significant cough or SOB  BREAST: NEGATIVE  "for masses, tenderness or discharge  CV: NEGATIVE for chest pain, palpitations or peripheral edema  GI: NEGATIVE for nausea, abdominal pain, heartburn, or change in bowel habits  : NEGATIVE for unusual urinary or vaginal symptoms. No vaginal bleeding.  MUSCULOSKELETAL: NEGATIVE for significant arthralgias or myalgia  NEURO: NEGATIVE for weakness, dizziness or paresthesias  PSYCHIATRIC: NEGATIVE for changes in mood or affect      OBJECTIVE:   /84   Pulse 70   Temp 97  F (36.1  C) (Temporal)   Ht 1.702 m (5' 7\")   Wt 128.8 kg (284 lb)   LMP 06/19/2023 (Approximate)   SpO2 99%   BMI 44.48 kg/m    Physical Exam  GENERAL APPEARANCE: healthy, alert and no distress  EYES: Eyes grossly normal to inspection, PERRL and conjunctivae and sclerae normal  HENT: ear canals and TM's normal, nose and mouth without ulcers or lesions, oropharynx clear and oral mucous membranes moist  NECK: no adenopathy, no asymmetry, masses, or scars and thyroid normal to palpation  RESP: lungs clear to auscultation - no rales, rhonchi or wheezes  BREAST: Defered, Mammogram  CV: regular rate and rhythm, normal S1 S2, no S3 or S4, no murmur, click or rub, + non pitting peripheral edema and peripheral pulses strong  ABDOMEN: soft, nontender, no hepatosplenomegaly, no masses and bowel sounds normal  MS: no musculoskeletal defects are noted and gait is age appropriate without ataxia  SKIN: no suspicious lesions or rashes  NEURO: Normal strength and tone, sensory exam grossly normal, mentation intact and speech normal  PSYCH: mentation appears normal and affect normal/bright    Diagnostic Test Results:  Labs reviewed in Epic    ASSESSMENT/PLAN:         Assessment and Plan  1. Routine general medical examination at a health care facility  Last seen pt on 5/2023 for VV of Morbid Obesity and started on Ozempic but given insurance non coveragewas referred to Wt management program -  and also has prediabetes in the past . She does have upcoming " appt in 9//2023. SHe is here for ANnual physical. Last labs in 7/2022 with hypocalcemia , normal Vit.D and CBC at that time. Recent A1C in 6/2023 showing prediabetes.   - REVIEW OF HEALTH MAINTENANCE PROTOCOL ORDERS  - MA SCREENING DIGITAL BILAT - Future  (s+30); Future  - ZOSTER RECOMBINANT ADJUVANTED (SHINGRIX)  - Lipid panel reflex to direct LDL Fasting; Future  - CBC with platelets; Future  - BNP-N terminal pro; Future  - Comprehensive metabolic panel (BMP + Alb, Alk Phos, ALT, AST, Total. Bili, TP); Future  - TD,PF 7+(TENIVAC)  - COLOGUARD(EXACT SCIENCES); Future  - TSH with free T4 reflex; Future  - Lipid panel reflex to direct LDL Fasting  - CBC with platelets  - BNP-N terminal pro  - Comprehensive metabolic panel (BMP + Alb, Alk Phos, ALT, AST, Total. Bili, TP)  - TSH with free T4 reflex    2. Benign essential hypertension  Chronic problem, borderline elevated inspite of being compliant with current Losartan. Recommend to check BP at home or her work place atleast 3 x/ week and inform if remaining high for further adjustment of dosage which pt understood and agreed with the plan.   - Comprehensive metabolic panel (BMP + Alb, Alk Phos, ALT, AST, Total. Bili, TP); Future  - losartan (COZAAR) 25 MG tablet; Take 1 tablet (25 mg) by mouth daily  Dispense: 90 tablet; Refill: 3  - Comprehensive metabolic panel (BMP + Alb, Alk Phos, ALT, AST, Total. Bili, TP)    3. Peripheral edema  Chronic problem, Uncontrolled. Will check pt renal function and BNP before starting on need ofr Lasix. Placed referral to Lymphedema therapy and also ordered for Compression stockings. Pt understood and agreed with the plan.   - BNP-N terminal pro; Future  - Comprehensive metabolic panel (BMP + Alb, Alk Phos, ALT, AST, Total. Bili, TP); Future  - Compression Sleeve/Stocking Order for DME - ONLY FOR DME  - BNP-N terminal pro  - Comprehensive metabolic panel (BMP + Alb, Alk Phos, ALT, AST, Total. Bili, TP)  - Lymphedema Therapy Referral;  Future    4. Morbid obesity (H)  Ongoing problem, Uncontrolled. Pt has been prescribed Ozempic which pt insurance declined. Pt is motivated and is awaiting for Wt management referral placed in the past for other options.     5. Celiac disease  - CBC with platelets; Future  - CBC with platelets    6. Low vitamin D level  - Vitamin D Deficiency; Future    7. Visit for screening mammogram  - MA SCREENING DIGITAL BILAT - Future  (s+30); Future    8. Screen for colon cancer  - COLOGUARD(EXACT SCIENCES); Future    9. Need for vaccination  - ZOSTER RECOMBINANT ADJUVANTED (SHINGRIX)  - TD,PF 7+(TENIVAC)         Please note that this note consists of symbols derived from keyboarding, dictation and/or voice recognition software. As a result, there may be errors in the script that have gone undetected. Please consider this when interpreting information found in this chart.    Patient Instructions   As discussed please do fasting labs placed.   Will make sure no metabolic causes of your edema and recommend Compression stockings.     ===============================    Preventive Health Recommendations  Female Ages 50 - 64    Yearly exam: See your health care provider every year in order to  o Review health changes.   o Discuss preventive care.    o Review your medicines if your doctor has prescribed any.      Get a Pap test every three years (unless you have an abnormal result and your provider advises testing more often).    If you get Pap tests with HPV test, you only need to test every 5 years, unless you have an abnormal result.     You do not need a Pap test if your uterus was removed (hysterectomy) and you have not had cancer.    You should be tested each year for STDs (sexually transmitted diseases) if you're at risk.     Have a mammogram every 1 to 2 years.    Have a colonoscopy at age 50, or have a yearly FIT test (stool test). These exams screen for colon cancer.      Have a cholesterol test every 5 years, or more  often if advised.    Have a diabetes test (fasting glucose) every three years. If you are at risk for diabetes, you should have this test more often.     If you are at risk for osteoporosis (brittle bone disease), think about having a bone density scan (DEXA).    Shots: Get a flu shot each year. Get a tetanus shot every 10 years.    Nutrition:     Eat at least 5 servings of fruits and vegetables each day.    Eat whole-grain bread, whole-wheat pasta and brown rice instead of white grains and rice.    Get adequate Calcium and Vitamin D.     Lifestyle    Exercise at least 150 minutes a week (30 minutes a day, 5 days a week). This will help you control your weight and prevent disease.    Limit alcohol to one drink per day.    No smoking.     Wear sunscreen to prevent skin cancer.     See your dentist every six months for an exam and cleaning.    See your eye doctor every 1 to 2 years.      Return in about 6 months (around 1/11/2024), or if symptoms worsen or fail to improve, for If symptoms persist, Follow up of last visit.    Luciana Franco MD  [facility]        Patient has been advised of split billing requirements and indicates understanding: Yes      COUNSELING:  Reviewed preventive health counseling, as reflected in patient instructions  Special attention given to:        Regular exercise       Healthy diet/nutrition       Vision screening       Hearing screening       Immunizations    Vaccinated for: Td and Zoster             Osteoporosis prevention/bone health       (Maribel)menopause management        She reports that she has never smoked. She has never used smokeless tobacco.      Luciana Franco MD  [facility]

## 2023-07-11 NOTE — PATIENT INSTRUCTIONS
As discussed please do fasting labs placed.   Will make sure no metabolic causes of your edema and recommend Compression stockings.     ===============================    Preventive Health Recommendations  Female Ages 50 - 64    Yearly exam: See your health care provider every year in order to  Review health changes.   Discuss preventive care.    Review your medicines if your doctor has prescribed any.    Get a Pap test every three years (unless you have an abnormal result and your provider advises testing more often).  If you get Pap tests with HPV test, you only need to test every 5 years, unless you have an abnormal result.   You do not need a Pap test if your uterus was removed (hysterectomy) and you have not had cancer.  You should be tested each year for STDs (sexually transmitted diseases) if you're at risk.   Have a mammogram every 1 to 2 years.  Have a colonoscopy at age 50, or have a yearly FIT test (stool test). These exams screen for colon cancer.    Have a cholesterol test every 5 years, or more often if advised.  Have a diabetes test (fasting glucose) every three years. If you are at risk for diabetes, you should have this test more often.   If you are at risk for osteoporosis (brittle bone disease), think about having a bone density scan (DEXA).    Shots: Get a flu shot each year. Get a tetanus shot every 10 years.    Nutrition:   Eat at least 5 servings of fruits and vegetables each day.  Eat whole-grain bread, whole-wheat pasta and brown rice instead of white grains and rice.  Get adequate Calcium and Vitamin D.     Lifestyle  Exercise at least 150 minutes a week (30 minutes a day, 5 days a week). This will help you control your weight and prevent disease.  Limit alcohol to one drink per day.  No smoking.   Wear sunscreen to prevent skin cancer.   See your dentist every six months for an exam and cleaning.  See your eye doctor every 1 to 2 years.

## 2023-07-12 LAB — DEPRECATED CALCIDIOL+CALCIFEROL SERPL-MC: 25 UG/L (ref 20–75)

## 2023-07-14 ENCOUNTER — THERAPY VISIT (OUTPATIENT)
Dept: PHYSICAL THERAPY | Facility: CLINIC | Age: 48
End: 2023-07-14
Attending: INTERNAL MEDICINE
Payer: COMMERCIAL

## 2023-07-14 DIAGNOSIS — R60.0 PERIPHERAL EDEMA: ICD-10-CM

## 2023-07-14 PROCEDURE — 97161 PT EVAL LOW COMPLEX 20 MIN: CPT | Mod: GP | Performed by: PHYSICAL THERAPIST

## 2023-07-14 PROCEDURE — 97535 SELF CARE MNGMENT TRAINING: CPT | Mod: GP | Performed by: PHYSICAL THERAPIST

## 2023-07-14 PROCEDURE — 97140 MANUAL THERAPY 1/> REGIONS: CPT | Mod: GP | Performed by: PHYSICAL THERAPIST

## 2023-07-14 PROCEDURE — 97110 THERAPEUTIC EXERCISES: CPT | Mod: GP | Performed by: PHYSICAL THERAPIST

## 2023-07-14 NOTE — PROGRESS NOTES
PHYSICAL THERAPY EVALUATION  Type of Visit: Evaluation    See electronic medical record for Abuse and Falls Screening details.    Subjective      Presenting condition or subjective complaint: Edema  Date of onset: 07/14/08    Relevant medical history:   L Lower leg open wound .  Dates & types of surgery: Cholecystectomy 1999?    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  Pain can be more present when not distracted by other things. Currently no pain in sitting.      Objective      EDEMA EVALUATION  Additional history:  Body part affected by edema: Lower legs  If cancer related, treatment:    If not cancer related, problems with veins or cause of swelling: Weight  Distance able to walk: 1/2 mile  Time able to stand: 20-60 min  Sensation problems in hands/feet: No    Edema etiology: suspect obesity related lymphedema and venous insufficiency    Cognitive Status Examination  Orientation: Oriented to person, place and time   Level of Consciousness: Alert  Follows Commands and Answers Questions: 100% of the time  Personal Safety and Judgement: Intact  Memory: Intact     LLIS: 19    EDEMA  Skin Condition: Hemosiderin deposits, Intact, Non-pitting, Venous distention, less involvement of feet, patient  reports tissue gets more firm and heavy later in the day, may have pitting then  Scar: No  Stemmer Sign: -  Ulceration: No    GIRTH MEASUREMENTS: Refer to separate girth measurement flowsheet.     VOLUME LE  Right LE (mL) 6935.16   Left LE (mL) 6909.65   LE Volume Comparison RUE volume greater than LUE volume   % Difference minimal     RANGE OF MOTION: Decreased B knee extension, ankle ROM WFL  STRENGTH: LE Strength WFL  POSTURE: WFL  PALPATION: Soft hyperplasia  ACTIVITIES OF DAILY LIVING: Independent  BED MOBILITY: Independent  TRANSFERS: Independent  GAIT/LOCOMOTION: Independent but distance limited  BALANCE: WNL  SENSATION: LE Sensation WNL    Assessment & Plan   CLINICAL IMPRESSIONS   Medical Diagnosis: Peripheral Edema    Treatment Diagnosis: Edema in the lower extremities   Impression/Assessment: Patient is a 48 year old female with edema complaints.  The following significant findings have been identified: Edema and Decreased activity tolerance. These impairments interfere with their ability to perform community mobility and yard upkeep with gardening as compared to previous level of function.     Clinical Decision Making (Complexity):   Clinical Presentation: Stable/Uncomplicated  Clinical Presentation Rationale: based on medical and personal factors listed in PT evaluation  Clinical Decision Making (Complexity): Low complexity    PLAN OF CARE  Treatment Interventions:  Interventions: Manual Therapy, Therapeutic Exercise, Self-Care/Home Management, garment fitting    Long Term Goals     PT Goal 1  Goal Identifier: Lymphedema Home Program  Goal Description: Pt will be independent with drainage exercise, self massage, and skin care to best manage swelling to decrease symptoms.  Target Date: 09/11/23  PT Goal 2  Goal Identifier: Volume  Goal Description: Pt will have 5% reduction in B volume (measured before noon) for improved tissue integrity, decreased risk of infection, and improved fit of clothing  Target  Date: 10/11/23  PT Goal 3  Goal Identifier: LLIS  Goal Description: Pt will have at least 7 point reduction in score on subsequent assessment to reflect the MCID in impact of swelling on quality of life.  Goal Progress: eval: 19  Target Date: 10/11/23  PT Goal 4  Goal Identifier: Maintenance  Goal Description: Pt will use compression garments as instructed AND home management tools/program for long term management of chronic condition to prevent tissue fibrosis, infection, wound and other secondary sequelae  Target Date: 04/13/24      Frequency of Treatment: 1x/week for 6 weeks, then 1x/2mo for 9 mo  Duration of Treatment: 9mo    Education Assessment:   Learner/Method: Patient;Demonstration  Education Comments: handout issued, eager    Risks and benefits of evaluation/treatment have been explained.   Patient/Family/caregiver agrees with Plan of Care.     Evaluation Time:     PT Eval, Low Complexity Minutes (95736): 10      Signing Clinician: Yu Stevens, PT

## 2023-07-21 ENCOUNTER — TELEPHONE (OUTPATIENT)
Dept: PHYSICAL THERAPY | Facility: CLINIC | Age: 48
End: 2023-07-21

## 2023-07-28 ENCOUNTER — TELEPHONE (OUTPATIENT)
Dept: PHYSICAL THERAPY | Facility: CLINIC | Age: 48
End: 2023-07-28

## 2023-10-04 ENCOUNTER — MYC MEDICAL ADVICE (OUTPATIENT)
Dept: FAMILY MEDICINE | Facility: CLINIC | Age: 48
End: 2023-10-04

## 2023-10-04 DIAGNOSIS — R19.5 POSITIVE COLORECTAL CANCER SCREENING USING COLOGUARD TEST: Primary | ICD-10-CM

## 2023-10-09 NOTE — TELEPHONE ENCOUNTER
Yes, she can schedule RN visit for the vaccines. Can cosign the orders.     Thank you  Luciana Franco MD on 10/9/2023

## 2023-12-28 NOTE — PROGRESS NOTES
DISCHARGE  Reason for Discharge: Patient chooses to discontinue therapy.  Patient has failed to schedule further appointments.    Equipment Issued: home program    Discharge Plan: Patient to continue home program.  Return to therapy with new order if problems persisting when ready to participate in therapy    Referring Provider:  Luciana Franco       07/14/23 0500   Appointment Info   Signing clinician's name / credentials Yu Stevens, PT,JOSSELINE   Total/Authorized Visits 1   Medical Diagnosis Peripheral Edema   PT Tx Diagnosis Edema in the lower extremities   Progress Note/Certification   Onset of illness/injury or Date of Surgery 07/14/08   Therapy Frequency 1x/week for 6 weeks, then 1x/2mo for 9 mo   Predicted Duration 9mo   Progress Note Due Date 10/11/23   Progress Note Completed Date 07/14/23   PT Goal 1   Goal Identifier Lymphedema Home Program   Goal Description Pt will be independent with drainage exercise, self massage, and skin care to best manage swelling to decrease symptoms.   Target Date 09/11/23   PT Goal 2   Goal Identifier Volume   Goal Description Pt will have 5% reduction in B volume (measured before noon) for improved tissue integrity, decreased risk of infection, and improved fit of clothing   Target Date 10/11/23   PT Goal 3   Goal Identifier LLIS   Goal Description Pt will have at least 7 point reduction in score on subsequent assessment to reflect the MCID in impact of swelling on quality of life.   Goal Progress eval: 19   Target Date 10/11/23   PT Goal 4   Goal Identifier Maintenance   Goal Description Pt will use compression garments as instructed AND home management tools/program for long term management of chronic condition to prevent tissue fibrosis, infection, wound and other secondary sequelae   Target Date 04/13/24   Subjective Report   Subjective Report Swelling worse at the end of the day.  Feels tight in the back of the knees.   Objective Measure 1   Objective Measure R   LE volume 10-50cm   Details 6935.16   Objective Measure 2   Objective Measure L LE volume 10-50cm   Details 6909.65   Objective Measure 3   Objective Measure Skin, edema   Details Non pitting, soft edema, hemosiderin staining and spider veins present   Therapeutic Procedure/Exercise   Therapeutic Procedures: strength, endurance, ROM, flexibillity minutes (60458) 10   Ther Proc 1 Edema Drainge Exercise for legs   Ther Proc 1 - Details pt educated on importance of movement for facilitating lymphatic flow.  Pt educated on deep breathing to promote lymphatic central pump then instruction on skin stretch with ROM: neck rotation, shoulder rolls, heelslides and ankle pumps along wtih muscle pump with gluteal and quad sets.   Manual Therapy   Manual Therapy: Mobilization, MFR, MLD, friction massage minutes (66141) 17   Manual Therapy 1 MLD   Manual Therapy 1 - Details Pt educated on lymphatic drainage for LE.  She is instructed on deep breathing to start.  Lymph node stimulation completed with therapist performing on patient and then on self, for pt to reverse demo.  Pt educated on hand placement and light skin stretch with about 30 small circles or pump at the lymph nodes of B SC, AX, and ING nodes.  Pt then educated on clearing strokes of trunk from waist toward axilla as best as able and then thighs to axilla.  Pt encouraged when sitting to massage the medial thighs stroking to outer hips as well.   Self Care/home Management   ADL/Home Mgmt Training (58264) 20   Self Care 1 home management and risk reduction education   Self Care 1 - Details Pt is educated on basic edema management principles. With LE swelling, pt is encouraged to elevate legs whenever possible including raising feet when sitting and taking opportunity to lie down for short periods during the day if able.  Pt is instructed to avoid long time standing or sitting. If standing try to sit and elevate, if sitting get up to walk around for about 1 minute for  every 30 minutes seated at table or desk, or infront of television. Pt is encouraged to drink lots of water to flush edema, about 6-8 glasses daily througout the day unless otherwise instructed by medical team. Pt educated to avoid excess salt in diet unless known to to have low sodium.  Pt is also reminded to take all medications, especially diuretcis as prescribed.  Pt is educated to avoid constriction of clothing or positioning like crossing legs.  Pt is educated on heat and cold can increase swelling but causing more blood flow and fluid filtering into tissues or conversely slowing the flow of lymph in the cold.  Pt educated on importance of exercise with movement and deep breathing to facilitate lymph flow and general recommendation for 150minutes per week of aerobic exercise for heart health, even if just multiple short sessions.  Pt is also encouraged to get good protein in the diet with each meal.  Signs of infection and importance of good skin care to reduce risk of inflammation or infection are reviewed.   Patient Response/Progress Written handout provided, pt with understanding.   Self Care 2 compression ed   Self Care 2 - Details Pt educatedon use of compression. She is informed of intensive treatment for reduction with bandages however may not achieve a lot of reduction due to not pitting hyperplasia with fluid present.  Pt did not feel she could do herself nor attend 3x/week. She is shown velcro alternative. Pt planning for weight loss and compression stockings will need to be replaced with sig weight loss but velcro garments may help reduce and are adjustable for weight loss. Pt interested and given info to make fitting appt.   Eval/Assessments   PT Eval, Low Complexity Minutes (67944) 10   Education   Learner/Method Patient;Demonstration   Education Comments handout issued, eager   Plan   Home program MLD and exercise, move more during day if possible, elevate when able, 180min aerobic for weight  loss   Updates to plan of care new, will need follow up after garments   Plan for next session when is fitting, schedule follow-up 2 weeks after that, then may be a hold for, review HEP and MLD   Total Session Time   Timed Code Treatment Minutes 47   Total Treatment Time (sum of timed and untimed services) 57

## 2024-01-02 DIAGNOSIS — I10 BENIGN ESSENTIAL HYPERTENSION: ICD-10-CM

## 2024-01-03 RX ORDER — LOSARTAN POTASSIUM 25 MG/1
25 TABLET ORAL DAILY
Qty: 90 TABLET | Refills: 0 | Status: SHIPPED | OUTPATIENT
Start: 2024-01-03 | End: 2024-03-28

## 2024-01-17 LAB — NONINV COLON CA DNA+OCC BLD SCRN STL QL: POSITIVE

## 2024-01-18 NOTE — RESULT ENCOUNTER NOTE
Your COLOGUARD test is positive, recommend follow up with gastroenterology for need of colonoscopy.     Referral to gastroenterology placed. Please let me know if you have any questions.    Luciana Franco MD on 1/17/2024

## 2024-01-26 ENCOUNTER — TELEPHONE (OUTPATIENT)
Dept: GASTROENTEROLOGY | Facility: CLINIC | Age: 49
End: 2024-01-26
Payer: COMMERCIAL

## 2024-01-26 ENCOUNTER — HOSPITAL ENCOUNTER (OUTPATIENT)
Facility: CLINIC | Age: 49
End: 2024-01-26
Attending: INTERNAL MEDICINE | Admitting: INTERNAL MEDICINE
Payer: COMMERCIAL

## 2024-01-26 DIAGNOSIS — R19.5 POSITIVE COLORECTAL CANCER SCREENING USING COLOGUARD TEST: Primary | ICD-10-CM

## 2024-03-04 ENCOUNTER — TELEPHONE (OUTPATIENT)
Dept: GASTROENTEROLOGY | Facility: CLINIC | Age: 49
End: 2024-03-04
Payer: COMMERCIAL

## 2024-03-04 NOTE — TELEPHONE ENCOUNTER
Caller: writer    Reason for Reschedule/Cancellation   (please be detailed, any staff messages or encounters to note?): provider      Prior to reschedule please review:  Ordering Provider: cynthia freitas   Sedation Determined: cs  Does patient have any ASC Exclusions, please identify?: no      Notes on Cancelled Procedure:  Procedure: Lower Endoscopy [Colonoscopy]   Date: 6/21  Location: [facility]; [address] [place] [address]   Surgeon: rajeev      Rescheduled: Yes,   Procedure: Lower Endoscopy [Colonoscopy]    Date: 6/28   Location: [facility]; [address] [place] [address]    Surgeon: chago   Sedation Level Scheduled  CS,  Reason for Sedation Level per order   Instructions updated and sent: rosalva

## 2024-03-06 ENCOUNTER — PATIENT OUTREACH (OUTPATIENT)
Dept: CARE COORDINATION | Facility: CLINIC | Age: 49
End: 2024-03-06
Payer: COMMERCIAL

## 2024-03-20 ENCOUNTER — NURSE TRIAGE (OUTPATIENT)
Dept: NURSING | Facility: CLINIC | Age: 49
End: 2024-03-20
Payer: COMMERCIAL

## 2024-03-20 ENCOUNTER — HOSPITAL ENCOUNTER (INPATIENT)
Facility: HOSPITAL | Age: 49
LOS: 2 days | Discharge: HOME | DRG: 270 | End: 2024-03-22
Attending: EMERGENCY MEDICINE | Admitting: HOSPITALIST
Payer: COMMERCIAL

## 2024-03-20 ENCOUNTER — APPOINTMENT (OUTPATIENT)
Dept: CT IMAGING | Facility: HOSPITAL | Age: 49
DRG: 270 | End: 2024-03-20
Attending: EMERGENCY MEDICINE
Payer: COMMERCIAL

## 2024-03-20 ENCOUNTER — APPOINTMENT (OUTPATIENT)
Dept: INTERVENTIONAL RADIOLOGY/VASCULAR | Facility: HOSPITAL | Age: 49
DRG: 270 | End: 2024-03-20
Attending: RADIOLOGY
Payer: COMMERCIAL

## 2024-03-20 DIAGNOSIS — I82.411 ACUTE DEEP VEIN THROMBOSIS (DVT) OF FEMORAL VEIN OF RIGHT LOWER EXTREMITY (H): Primary | ICD-10-CM

## 2024-03-20 DIAGNOSIS — I26.02 ACUTE SADDLE PULMONARY EMBOLISM WITH ACUTE COR PULMONALE (H): ICD-10-CM

## 2024-03-20 LAB
ANION GAP SERPL CALCULATED.3IONS-SCNC: 13 MMOL/L (ref 7–15)
APTT PPP: 26 SECONDS (ref 22–38)
BUN SERPL-MCNC: 15.6 MG/DL (ref 6–20)
CALCIUM SERPL-MCNC: 9.4 MG/DL (ref 8.6–10)
CHLORIDE SERPL-SCNC: 103 MMOL/L (ref 98–107)
CREAT BLD-MCNC: 0.7 MG/DL (ref 0.6–1.1)
CREAT SERPL-MCNC: 0.7 MG/DL (ref 0.51–0.95)
DEPRECATED HCO3 PLAS-SCNC: 23 MMOL/L (ref 22–29)
EGFRCR SERPLBLD CKD-EPI 2021: >60 ML/MIN/1.73M2
EGFRCR SERPLBLD CKD-EPI 2021: >90 ML/MIN/1.73M2
ERYTHROCYTE [DISTWIDTH] IN BLOOD BY AUTOMATED COUNT: 12.1 % (ref 10–15)
GLUCOSE BLDC GLUCOMTR-MCNC: 199 MG/DL (ref 70–99)
GLUCOSE SERPL-MCNC: 148 MG/DL (ref 70–99)
HBA1C MFR BLD: 6.1 %
HCT VFR BLD AUTO: 47.3 % (ref 35–47)
HGB BLD-MCNC: 15.8 G/DL (ref 11.7–15.7)
HOLD SPECIMEN: NORMAL
INR PPP: 0.99 (ref 0.85–1.15)
MCH RBC QN AUTO: 30.2 PG (ref 26.5–33)
MCHC RBC AUTO-ENTMCNC: 33.4 G/DL (ref 31.5–36.5)
MCV RBC AUTO: 90 FL (ref 78–100)
NT-PROBNP SERPL-MCNC: <36 PG/ML (ref 0–900)
PLATELET # BLD AUTO: 265 10E3/UL (ref 150–450)
POTASSIUM SERPL-SCNC: 4 MMOL/L (ref 3.4–5.3)
RADIOLOGIST FLAGS: ABNORMAL
RBC # BLD AUTO: 5.23 10E6/UL (ref 3.8–5.2)
SODIUM SERPL-SCNC: 139 MMOL/L (ref 135–145)
TROPONIN T SERPL HS-MCNC: 69 NG/L
TSH SERPL DL<=0.005 MIU/L-ACNC: 1.91 UIU/ML (ref 0.3–4.2)
WBC # BLD AUTO: 8 10E3/UL (ref 4–11)

## 2024-03-20 PROCEDURE — 84484 ASSAY OF TROPONIN QUANT: CPT | Performed by: EMERGENCY MEDICINE

## 2024-03-20 PROCEDURE — 250N000009 HC RX 250: Performed by: RADIOLOGY

## 2024-03-20 PROCEDURE — 85027 COMPLETE CBC AUTOMATED: CPT | Performed by: EMERGENCY MEDICINE

## 2024-03-20 PROCEDURE — 99223 1ST HOSP IP/OBS HIGH 75: CPT | Performed by: HOSPITALIST

## 2024-03-20 PROCEDURE — 71275 CT ANGIOGRAPHY CHEST: CPT

## 2024-03-20 PROCEDURE — 76937 US GUIDE VASCULAR ACCESS: CPT

## 2024-03-20 PROCEDURE — 85610 PROTHROMBIN TIME: CPT | Performed by: EMERGENCY MEDICINE

## 2024-03-20 PROCEDURE — 02CQ3ZZ EXTIRPATION OF MATTER FROM RIGHT PULMONARY ARTERY, PERCUTANEOUS APPROACH: ICD-10-PCS | Performed by: RADIOLOGY

## 2024-03-20 PROCEDURE — 84443 ASSAY THYROID STIM HORMONE: CPT | Performed by: EMERGENCY MEDICINE

## 2024-03-20 PROCEDURE — C1757 CATH, THROMBECTOMY/EMBOLECT: HCPCS

## 2024-03-20 PROCEDURE — 200N000001 HC R&B ICU

## 2024-03-20 PROCEDURE — 82565 ASSAY OF CREATININE: CPT

## 2024-03-20 PROCEDURE — 85730 THROMBOPLASTIN TIME PARTIAL: CPT | Performed by: EMERGENCY MEDICINE

## 2024-03-20 PROCEDURE — 37184 PRIM ART M-THRMBC 1ST VSL: CPT | Mod: 50

## 2024-03-20 PROCEDURE — 93005 ELECTROCARDIOGRAM TRACING: CPT | Performed by: EMERGENCY MEDICINE

## 2024-03-20 PROCEDURE — 80048 BASIC METABOLIC PNL TOTAL CA: CPT | Performed by: EMERGENCY MEDICINE

## 2024-03-20 PROCEDURE — 86146 BETA-2 GLYCOPROTEIN ANTIBODY: CPT | Performed by: HOSPITALIST

## 2024-03-20 PROCEDURE — 255N000002 HC RX 255 OP 636: Performed by: HOSPITALIST

## 2024-03-20 PROCEDURE — 83036 HEMOGLOBIN GLYCOSYLATED A1C: CPT | Performed by: HOSPITALIST

## 2024-03-20 PROCEDURE — 250N000011 HC RX IP 250 OP 636: Performed by: RADIOLOGY

## 2024-03-20 PROCEDURE — 83880 ASSAY OF NATRIURETIC PEPTIDE: CPT | Performed by: EMERGENCY MEDICINE

## 2024-03-20 PROCEDURE — 272N000117 HC CATH CR2

## 2024-03-20 PROCEDURE — C1769 GUIDE WIRE: HCPCS

## 2024-03-20 PROCEDURE — 272N000566 HC SHEATH CR3

## 2024-03-20 PROCEDURE — 272N000500 HC NEEDLE CR2

## 2024-03-20 PROCEDURE — 02CR3ZZ EXTIRPATION OF MATTER FROM LEFT PULMONARY ARTERY, PERCUTANEOUS APPROACH: ICD-10-PCS | Performed by: RADIOLOGY

## 2024-03-20 PROCEDURE — 36014 PLACE CATHETER IN ARTERY: CPT | Mod: 50

## 2024-03-20 PROCEDURE — 250N000011 HC RX IP 250 OP 636: Performed by: EMERGENCY MEDICINE

## 2024-03-20 PROCEDURE — 36415 COLL VENOUS BLD VENIPUNCTURE: CPT | Performed by: EMERGENCY MEDICINE

## 2024-03-20 PROCEDURE — 99291 CRITICAL CARE FIRST HOUR: CPT | Mod: 25

## 2024-03-20 PROCEDURE — 99152 MOD SED SAME PHYS/QHP 5/>YRS: CPT

## 2024-03-20 RX ORDER — HYDRALAZINE HYDROCHLORIDE 20 MG/ML
10 INJECTION INTRAMUSCULAR; INTRAVENOUS EVERY 4 HOURS PRN
Status: DISCONTINUED | OUTPATIENT
Start: 2024-03-20 | End: 2024-03-22 | Stop reason: HOSPADM

## 2024-03-20 RX ORDER — LIDOCAINE 40 MG/G
CREAM TOPICAL
Status: DISCONTINUED | OUTPATIENT
Start: 2024-03-20 | End: 2024-03-22 | Stop reason: HOSPADM

## 2024-03-20 RX ORDER — NALOXONE HYDROCHLORIDE 0.4 MG/ML
0.2 INJECTION, SOLUTION INTRAMUSCULAR; INTRAVENOUS; SUBCUTANEOUS
Status: DISCONTINUED | OUTPATIENT
Start: 2024-03-20 | End: 2024-03-21 | Stop reason: HOSPADM

## 2024-03-20 RX ORDER — NALOXONE HYDROCHLORIDE 0.4 MG/ML
0.4 INJECTION, SOLUTION INTRAMUSCULAR; INTRAVENOUS; SUBCUTANEOUS
Status: DISCONTINUED | OUTPATIENT
Start: 2024-03-20 | End: 2024-03-21 | Stop reason: HOSPADM

## 2024-03-20 RX ORDER — AMOXICILLIN 250 MG
1 CAPSULE ORAL 2 TIMES DAILY PRN
Status: DISCONTINUED | OUTPATIENT
Start: 2024-03-20 | End: 2024-03-22 | Stop reason: HOSPADM

## 2024-03-20 RX ORDER — ACETAMINOPHEN 650 MG/1
650 SUPPOSITORY RECTAL EVERY 4 HOURS PRN
Status: DISCONTINUED | OUTPATIENT
Start: 2024-03-20 | End: 2024-03-22 | Stop reason: HOSPADM

## 2024-03-20 RX ORDER — AMOXICILLIN 250 MG
2 CAPSULE ORAL 2 TIMES DAILY PRN
Status: DISCONTINUED | OUTPATIENT
Start: 2024-03-20 | End: 2024-03-22 | Stop reason: HOSPADM

## 2024-03-20 RX ORDER — LOSARTAN POTASSIUM 25 MG/1
25 TABLET ORAL DAILY
Status: DISCONTINUED | OUTPATIENT
Start: 2024-03-21 | End: 2024-03-22 | Stop reason: HOSPADM

## 2024-03-20 RX ORDER — NICOTINE POLACRILEX 4 MG
15-30 LOZENGE BUCCAL
Status: DISCONTINUED | OUTPATIENT
Start: 2024-03-20 | End: 2024-03-22 | Stop reason: HOSPADM

## 2024-03-20 RX ORDER — ACETAMINOPHEN 325 MG/1
650 TABLET ORAL EVERY 4 HOURS PRN
Status: DISCONTINUED | OUTPATIENT
Start: 2024-03-20 | End: 2024-03-22 | Stop reason: HOSPADM

## 2024-03-20 RX ORDER — FENTANYL CITRATE 50 UG/ML
25-50 INJECTION, SOLUTION INTRAMUSCULAR; INTRAVENOUS EVERY 5 MIN PRN
Status: DISCONTINUED | OUTPATIENT
Start: 2024-03-20 | End: 2024-03-21 | Stop reason: HOSPADM

## 2024-03-20 RX ORDER — HEPARIN SODIUM 200 [USP'U]/100ML
1 INJECTION, SOLUTION INTRAVENOUS CONTINUOUS PRN
Status: DISCONTINUED | OUTPATIENT
Start: 2024-03-20 | End: 2024-03-21 | Stop reason: HOSPADM

## 2024-03-20 RX ORDER — DEXTROSE MONOHYDRATE 25 G/50ML
25-50 INJECTION, SOLUTION INTRAVENOUS
Status: DISCONTINUED | OUTPATIENT
Start: 2024-03-20 | End: 2024-03-22 | Stop reason: HOSPADM

## 2024-03-20 RX ORDER — CETIRIZINE HYDROCHLORIDE 5 MG/1
5-10 TABLET ORAL DAILY PRN
COMMUNITY

## 2024-03-20 RX ORDER — FLUMAZENIL 0.1 MG/ML
0.2 INJECTION, SOLUTION INTRAVENOUS
Status: DISCONTINUED | OUTPATIENT
Start: 2024-03-20 | End: 2024-03-21 | Stop reason: HOSPADM

## 2024-03-20 RX ORDER — IOPAMIDOL 755 MG/ML
90 INJECTION, SOLUTION INTRAVASCULAR ONCE
Status: COMPLETED | OUTPATIENT
Start: 2024-03-20 | End: 2024-03-20

## 2024-03-20 RX ORDER — HEPARIN SODIUM 10000 [USP'U]/100ML
0-5000 INJECTION, SOLUTION INTRAVENOUS CONTINUOUS
Status: DISCONTINUED | OUTPATIENT
Start: 2024-03-20 | End: 2024-03-22 | Stop reason: HOSPADM

## 2024-03-20 RX ORDER — CALCIUM CARBONATE 500 MG/1
1000 TABLET, CHEWABLE ORAL 4 TIMES DAILY PRN
Status: DISCONTINUED | OUTPATIENT
Start: 2024-03-20 | End: 2024-03-22 | Stop reason: HOSPADM

## 2024-03-20 RX ADMIN — HEPARIN SODIUM 3 BAG: 200 INJECTION, SOLUTION INTRAVENOUS at 22:10

## 2024-03-20 RX ADMIN — FENTANYL CITRATE 50 MCG: 50 INJECTION, SOLUTION INTRAMUSCULAR; INTRAVENOUS at 21:43

## 2024-03-20 RX ADMIN — MIDAZOLAM HYDROCHLORIDE 0.5 MG: 1 INJECTION, SOLUTION INTRAMUSCULAR; INTRAVENOUS at 22:16

## 2024-03-20 RX ADMIN — LIDOCAINE HYDROCHLORIDE 20 ML: 10 INJECTION, SOLUTION INFILTRATION; PERINEURAL at 21:50

## 2024-03-20 RX ADMIN — MIDAZOLAM HYDROCHLORIDE 0.5 MG: 1 INJECTION, SOLUTION INTRAMUSCULAR; INTRAVENOUS at 21:49

## 2024-03-20 RX ADMIN — MIDAZOLAM HYDROCHLORIDE 1 MG: 1 INJECTION, SOLUTION INTRAMUSCULAR; INTRAVENOUS at 21:37

## 2024-03-20 RX ADMIN — IOPAMIDOL 90 ML: 755 INJECTION, SOLUTION INTRAVENOUS at 19:32

## 2024-03-20 RX ADMIN — IOHEXOL 75 ML: 350 INJECTION, SOLUTION INTRAVENOUS at 22:54

## 2024-03-20 RX ADMIN — FENTANYL CITRATE 25 MCG: 50 INJECTION, SOLUTION INTRAMUSCULAR; INTRAVENOUS at 22:33

## 2024-03-20 RX ADMIN — FENTANYL CITRATE 50 MCG: 50 INJECTION, SOLUTION INTRAMUSCULAR; INTRAVENOUS at 21:39

## 2024-03-20 RX ADMIN — FENTANYL CITRATE 25 MCG: 50 INJECTION, SOLUTION INTRAMUSCULAR; INTRAVENOUS at 22:05

## 2024-03-20 RX ADMIN — MIDAZOLAM HYDROCHLORIDE 0.5 MG: 1 INJECTION, SOLUTION INTRAMUSCULAR; INTRAVENOUS at 22:28

## 2024-03-20 RX ADMIN — HEPARIN SODIUM AND DEXTROSE 1800 UNITS/HR: 10000; 5 INJECTION INTRAVENOUS at 21:29

## 2024-03-20 RX ADMIN — MIDAZOLAM HYDROCHLORIDE 1 MG: 1 INJECTION, SOLUTION INTRAMUSCULAR; INTRAVENOUS at 21:41

## 2024-03-20 RX ADMIN — FENTANYL CITRATE 25 MCG: 50 INJECTION, SOLUTION INTRAMUSCULAR; INTRAVENOUS at 22:20

## 2024-03-20 ASSESSMENT — ACTIVITIES OF DAILY LIVING (ADL)
ADLS_ACUITY_SCORE: 35

## 2024-03-20 NOTE — ED PROVIDER NOTES
EMERGENCY DEPARTMENT ENCOUNTER      NAME: Breanna Fischer  AGE: 48 year old female  YOB: 1975  MRN: [id number]  EVALUATION DATE & TIME: No admission date for patient encounter.    PCP: Luciana Franco    ED PROVIDER: Lindsey Reddy PA-C      Chief Complaint   Patient presents with    Leg Pain         FINAL IMPRESSION:  1. Acute saddle pulmonary embolism with acute cor pulmonale (H)          MEDICAL DECISION MAKING:    Pertinent Labs & Imaging studies reviewed. (See chart for details)  48 year old female presents to the Emergency Department for evaluation of left leg pain, chest pressure, palpitations and shortness of breath.  4 days ago the patient started to have some left lower leg discomfort in her calf.  Pain continued and today around 430 she had sudden onset of palpitations, shortness of breath and chest pressure.  She was concerned and presented to the emergency department.  On my evaluation, the patient was significantly tachycardic at 126, hypertensive at 216/118 and SpO2 was 95% on room air.  Patient not in respiratory distress.  Examination with heart in tachycardic rate but regular rhythm and lungs clear to auscultation bilaterally.  Bilateral lower extremities with no significant swelling, redness or warmth.  Left leg with posterior calf tenderness to palpation.  2+ DP pulses bilaterally and distal CMS intact.  Differential diagnosis included PE, DVT, ACS, arrhythmia, electrolyte abnormality, thyroid dysfunction.    Patient has no risk factors for PE however, with the leg pain and sudden onset of chest pressure, shortness of breath and palpitations I do have high suspicion for saddle PE at this time.  I discussed my concerns with the patient and need for emergent CT imaging and she was in agreement and understanding.  I did call over to CT where we obtained a i-STAT creatinine and CT PE study.  I did independently interpreted the CT PE study which showed a large saddle PE which was  confirmed by the radiologist which also showed right heart strain.  Anila MORALES who reviewed imaging and plan is for thrombectomy.  Initiated heparin therapy.  Laboratory evaluation was collected and occluded CBC, BMP, troponin, PTT, INR, TSH, BNP.  CBC without significant derangement.  BMP with slightly high glucose of 148 no other significant derangement.  EKG in sinus tachycardia without any significant ST or T wave changes concerning for ACS.  Initial troponin is positive at 69 however, with PE with right heart strain I do feel this is the likely cause of the elevation.  PTT and INR within normal limits.  TSH within normal limits.  BNP within normal limits.  I updated patient on results and plan of care and she was in agreement understanding.  I spoke with the hospitalist Dr. Clark who accepted for admission as well as the intensivist Dr. Smith was aware the patient is coming to the ICU and under the care of Dr. Clark.  She was taken to straight to the interventional radiology from the emergency department for thrombectomy in stable condition.    Critical Care time was 60 minutes for this patient excluding procedures.     Medical Decision Making  Obtained supplemental history:Supplemental history obtained?: No  Reviewed external records: External records reviewed?: No  Care impacted by chronic illness:Hypertension  Care significantly affected by social determinants of health:N/A  Did you consider but not order tests?: Work up considered but not performed and documented in chart, if applicable  Did you interpret images independently?: Independent interpretation of ECG and images noted in documentation, when applicable.  Consultation discussion with other provider:Did you involve another provider (consultant, MH, pharmacy, etc.)?: I discussed the care with another health care provider, see documentation for details.  Admit.     ED COURSE:  6:40 PM I met with the patient, obtained history, performed an initial exam,  and discussed options and plan for diagnostics and treatment here in the ED.    6:45 PM I staffed the patient with Dr. Urena.     7:38 PM I independently interpreted the CT PE study which confirmed my suspicion for saddle PE.  Interventional radiology was paged.    7:44 PM I spoke with Dr. Bell from [place] radiology who confirmed large saddle PE with right heart strain on CT scan.    7:46 PM I spoke to Dr. Joseph from interventional radiology who I reviewed the CT images and will take the patient for IR procedure.  He would like heparin initiated and this was ordered.    7:50 PM updated patient on plan of care and she was in agreement understanding.  Dr. Urena had already updated her on her results of PE.  Currently, patient is feeling well any significant difficulty breathing or chest pain.    8:38 PM spoke with Dr. Clark who accepted the patient for admission.    9:30 PM I spoke with Dr. Smith the intensivist who is aware of the patient coming to the ICU who will be managed by the hospitalist Dr. Clark.    At the conclusion of the encounter I discussed the results of all of the tests and the disposition. The questions were answered. The patient or family acknowledged understanding and was agreeable with the care plan.     MEDICATIONS GIVEN IN THE EMERGENCY:  Medications   heparin ANTICOAGULANT loading dose for  HIGH INTENSITY TREATMENT* Give BEFORE starting heparin infusion (4,000 Units Intravenous $Given 3/20/24 2208)       NEW PRESCRIPTIONS STARTED AT TODAY'S ER VISIT  New Prescriptions    No medications on file            =================================================================    HPI:    Patient information was obtained from: the patient    Use of Interpretor: N/A         Breanna Fischer is a 48 year old female with a pertinent history of morbid obesity, prediabetes and hypertension who presents to this ED by walk-in for evaluation of calf pain.    The patient reports that she developed pain on the  backside of her left calf on the night of 03/16. She has not noticed any swelling or redness to the area. She has applied heat to her leg to alleviate her pain. The patient states that she can feel the pain moving from her middle to lower calf at times. Additionally, she had two episodes of dizziness upon standing today, 03/20. She also experienced heart palpitations, chest pressure and difficulties breathing at around 4:30 PM today. She otherwise denies having numbness or tingling in her left leg, vomiting or nausea.     The patient denies falling or sustaining any trauma to her left leg. Additionally, she has not recently traveled or had any surgeries. She does not have a history of blood clots in her legs or lungs, does not have a history of cancer and is not taking estrogen.     Patient last ate beans and potatoes around 1 PM.      REVIEW OF SYSTEMS:  Negative unless otherwise stated in the above HPI.       PAST MEDICAL HISTORY:  Past Medical History:   Diagnosis Date    Celiac disease     Hypertension        PAST SURGICAL HISTORY:  Past Surgical History:   Procedure Laterality Date    ABDOMEN SURGERY  1999    Gall bladder    CHOLECYSTECTOMY      ENT SURGERY             CURRENT MEDICATIONS:      Current Facility-Administered Medications:     acetaminophen (TYLENOL) tablet 650 mg, 650 mg, Oral, Q4H PRN **OR** acetaminophen (TYLENOL) Suppository 650 mg, 650 mg, Rectal, Q4H PRN, Milad Clark,     calcium carbonate (TUMS) chewable tablet 1,000 mg, 1,000 mg, Oral, 4x Daily PRN, Milad Clark,     glucose gel 15-30 g, 15-30 g, Oral, Q15 Min PRN **OR** dextrose 50 % injection 25-50 mL, 25-50 mL, Intravenous, Q15 Min PRN **OR** glucagon injection 1 mg, 1 mg, Subcutaneous, Q15 Min PRN, Milad Clark DO    fentaNYL (PF) (SUBLIMAZE) injection 25-50 mcg, 25-50 mcg, Intravenous, Q5 Min PRN, Nathan Joseph MD, 25 mcg at 03/20/24 2205    flumazenil (ROMAZICON) injection 0.2 mg, 0.2 mg, Intravenous, q1 min prn,  Nathan Joseph MD    heparin 2 Units/mL in 0.9% NaCl (500 mL), 1 Bag, TABLE SOLN, Continuous PRN, Nathan Joseph MD    heparin infusion 25,000 units in D5W 250 mL ANTICOAGULANT, 0-5,000 Units/hr, Intravenous, Continuous, Lindsey Reddy PA-C, Last Rate: 18 mL/hr at 03/20/24 2129, 1,800 Units/hr at 03/20/24 2129    hydrALAZINE (APRESOLINE) injection 10 mg, 10 mg, Intravenous, Q4H PRN, Milad Clark DO    [START ON 3/21/2024] insulin aspart (NovoLOG) injection (RAPID ACTING), 1-7 Units, Subcutaneous, TID AC, Milad Clark DO    insulin aspart (NovoLOG) injection (RAPID ACTING), 1-5 Units, Subcutaneous, At Bedtime, Milad Clark DO    lidocaine (LMX4) cream, , Topical, Q1H PRN, Milad Clark DO    lidocaine 1 % 0.1-1 mL, 0.1-1 mL, Other, Q1H PRN, Milad Clark DO    lidocaine 1 % 1-30 mL, 1-30 mL, Intradermal, Once PRN, Nathan Joseph MD    [START ON 3/21/2024] losartan (COZAAR) tablet 25 mg, 25 mg, Oral, Daily, Milad Clark DO    midazolam (VERSED) injection 0.5-2 mg, 0.5-2 mg, Intravenous, Q4 Min PRN, Nathan Joseph MD, 0.5 mg at 03/20/24 2149    naloxone (NARCAN) injection 0.2 mg, 0.2 mg, Intravenous, Q2 Min PRN **OR** naloxone (NARCAN) injection 0.4 mg, 0.4 mg, Intravenous, Q2 Min PRN **OR** naloxone (NARCAN) injection 0.2 mg, 0.2 mg, Intramuscular, Q2 Min PRN **OR** naloxone (NARCAN) injection 0.4 mg, 0.4 mg, Intramuscular, Q2 Min PRN, Nathan Joseph MD    Patient is already receiving anticoagulation with heparin, enoxaparin (LOVENOX), warfarin (COUMADIN)  or other anticoagulant medication, , Does not apply, Continuous PRN, Milad Clark,     senna-docusate (SENOKOT-S/PERICOLACE) 8.6-50 MG per tablet 1 tablet, 1 tablet, Oral, BID PRN **OR** senna-docusate (SENOKOT-S/PERICOLACE) 8.6-50 MG per tablet 2 tablet, 2 tablet, Oral, BID PRN, Milad Clark DO    sodium chloride (PF) 0.9% PF flush 3 mL, 3 mL, Intracatheter, Q8H, Milad Clark,     sodium chloride (PF) 0.9% PF flush 3 mL, 3  mL, Intracatheter, q1 min baljit, Milad Clark DO    Current Outpatient Medications:     cetirizine (ZYRTEC) 5 MG tablet, Take 5-10 mg by mouth daily as needed for allergies, Disp: , Rfl:     losartan (COZAAR) 25 MG tablet, TAKE ONE TABLET BY MOUTH ONCE DAILY, Disp: 90 tablet, Rfl: 0      ALLERGIES:  Allergies   Allergen Reactions    Lisinopril Cough    Tea Tree Oil Rash       FAMILY HISTORY:  Family History   Problem Relation Age of Onset    Obesity Mother     Thyroid Disease Mother     Diabetes Mother     Hypertension Mother     Thyroid Disease Father     Diabetes Father     Hypertension Father     Obesity Father     Breast Cancer Paternal Grandmother     Obesity Sister     Thyroid Disease Sister     Cerebrovascular Disease Sister     Other Cancer Sister     Asthma Sister     Melanoma No family hx of     Skin Cancer No family hx of        SOCIAL HISTORY:   Social History     Socioeconomic History    Marital status:    Tobacco Use    Smoking status: Never    Smokeless tobacco: Never   Substance and Sexual Activity    Alcohol use: No    Drug use: No    Sexual activity: Not Currently     Partners: Male     Birth control/protection: Abstinence   Other Topics Concern    Parent/sibling w/ CABG, MI or angioplasty before 65F 55M? Yes     Comment: Radiation damaged my sister's heart, 2-3 heart attacks in 30       VITALS:  Patient Vitals for the past 24 hrs:   BP Temp Temp src Pulse Resp SpO2 Height Weight   03/20/24 2155 (!) 169/74 -- -- 109 15 91 % -- --   03/20/24 2150 (!) 169/77 -- -- 111 12 93 % -- --   03/20/24 2145 (!) 169/75 -- -- 118 13 90 % -- --   03/20/24 2140 (!) 182/81 -- -- 115 19 94 % -- --   03/20/24 2136 (!) 178/81 -- -- 113 21 94 % -- --   03/20/24 2130 -- -- -- 114 21 94 % -- --   03/20/24 2125 (!) 192/81 -- -- 115 20 94 % -- --   03/20/24 2120 (!) 202/100 -- -- 116 25 96 % -- --   03/20/24 2115 (!) 202/82 -- -- 115 15 -- -- --   03/20/24 2110 (!) 179/80 -- -- 115 14 95 % -- --   03/20/24 2030  "(!) 189/84 -- -- 119 27 96 % -- --   03/20/24 2015 (!) 196/95 -- -- 120 22 93 % -- --   03/20/24 2000 (!) 198/94 -- -- 119 26 95 % -- --   03/20/24 1945 (!) 188/91 -- -- 119 26 96 % -- --   03/20/24 1900 (!) 159/93 -- -- 119 26 94 % -- --   03/20/24 1845 (!) 172/102 -- -- 118 21 95 % -- --   03/20/24 1830 (!) 193/105 -- -- -- -- -- -- --   03/20/24 1827 -- -- -- -- -- -- 1.727 m (5' 8\") 127 kg (280 lb)   03/20/24 1826 (!) 216/118 97.3  F (36.3  C) Rectal (!) 126 16 95 % -- --       PHYSICAL EXAM    Constitutional: Well developed, Well nourished, NAD  HENT: Normocephalic, Atraumatic, Bilateral external ears normal, Oropharynx normal, mucous membranes moist, Nose normal.   Neck: Normal range of motion, No tenderness, Supple, No stridor.  Eyes: PERRL, EOMI, Conjunctiva normal, No discharge.   Respiratory: Normal breath sounds, No respiratory distress, No wheezing, Speaks full sentences easily. No cough.  Cardiovascular: Normal heart rate, Regular rhythm, No murmurs, No rubs, No gallops. Chest wall nontender.  GI: Soft, No tenderness, No masses, No flank tenderness. No rebound or guarding.  Musculoskeletal: 2+ DP pulses. No edema. No cyanosis, No clubbing. Good range of motion in all major joints. No tenderness to palpation or major deformities noted. Tenderness to palpation to the posterior left calf without significant swelling, erythema or warmth.  Integument: Warm, Dry, No erythema, No rash. No petechiae.  Neurologic: Alert & oriented x 3, Normal motor function, Normal sensory function, No focal deficits noted. Normal gait.  Psychiatric: Affect normal, Judgment normal, Mood normal. Cooperative.    LAB:  All pertinent labs reviewed and interpreted.  Recent Results (from the past 24 hour(s))   CBC (+ platelets, no diff)    Collection Time: 03/20/24  6:47 PM   Result Value Ref Range    WBC Count 8.0 4.0 - 11.0 10e3/uL    RBC Count 5.23 (H) 3.80 - 5.20 10e6/uL    Hemoglobin 15.8 (H) 11.7 - 15.7 g/dL    Hematocrit 47.3 " (H) 35.0 - 47.0 %    MCV 90 78 - 100 fL    MCH 30.2 26.5 - 33.0 pg    MCHC 33.4 31.5 - 36.5 g/dL    RDW 12.1 10.0 - 15.0 %    Platelet Count 265 150 - 450 10e3/uL   Basic metabolic panel    Collection Time: 03/20/24  6:47 PM   Result Value Ref Range    Sodium 139 135 - 145 mmol/L    Potassium 4.0 3.4 - 5.3 mmol/L    Chloride 103 98 - 107 mmol/L    Carbon Dioxide (CO2) 23 22 - 29 mmol/L    Anion Gap 13 7 - 15 mmol/L    Urea Nitrogen 15.6 6.0 - 20.0 mg/dL    Creatinine 0.70 0.51 - 0.95 mg/dL    GFR Estimate >90 >60 mL/min/1.73m2    Calcium 9.4 8.6 - 10.0 mg/dL    Glucose 148 (H) 70 - 99 mg/dL   Troponin T, High Sensitivity (now)    Collection Time: 03/20/24  6:47 PM   Result Value Ref Range    Troponin T, High Sensitivity 69 (H) <=14 ng/L   PTT    Collection Time: 03/20/24  6:47 PM   Result Value Ref Range    aPTT 26 22 - 38 Seconds   INR    Collection Time: 03/20/24  6:47 PM   Result Value Ref Range    INR 0.99 0.85 - 1.15   TSH with free T4 reflex    Collection Time: 03/20/24  6:47 PM   Result Value Ref Range    TSH 1.91 0.30 - 4.20 uIU/mL   Extra Blue Top Tube    Collection Time: 03/20/24  6:47 PM   Result Value Ref Range    Hold Specimen JIC    Extra Red Top Tube    Collection Time: 03/20/24  6:47 PM   Result Value Ref Range    Hold Specimen JIC    Extra Green Top (Lithium Heparin) Tube    Collection Time: 03/20/24  6:47 PM   Result Value Ref Range    Hold Specimen JIC    Extra Purple Top Tube    Collection Time: 03/20/24  6:47 PM   Result Value Ref Range    Hold Specimen JIC    N terminal pro BNP outpatient    Collection Time: 03/20/24  6:47 PM   Result Value Ref Range    N Terminal Pro BNP Outpatient <36 0 - 900 pg/mL   Hemoglobin A1c    Collection Time: 03/20/24  6:47 PM   Result Value Ref Range    Hemoglobin A1C 6.1 (H) <5.7 %   Creatinine POCT    Collection Time: 03/20/24  7:18 PM   Result Value Ref Range    Creatinine POCT 0.7 0.6 - 1.1 mg/dL    GFR, ESTIMATED POCT >60 >60 mL/min/1.73m2   CT Chest Pulmonary  Embolism w Contrast    Collection Time: 03/20/24  7:32 PM   Result Value Ref Range    Radiologist flags New diagnosis of pulmonary embolism (AA)          RADIOLOGY:  Reviewed all pertinent imaging. Please see official radiology report.  CT Chest Pulmonary Embolism w Contrast   Final Result   Abnormal   IMPRESSION:   1.  Multiple pulmonary emboli bilaterally including large saddle pulmonary embolus. Elevated right heart pressure.         [Critical Result: New diagnosis of pulmonary embolism]      Finding was identified on 3/20/2024 7:40 PM CDT.       Physician assistant Lindsey Reddy was contacted by me on 3/20/2024 7:45 PM CDT and verbalized understanding of the critical result.      IR Pulmonary Angiogram Bilateral    (Results Pending)   US Lower Extremity Venous Duplex Bilateral    (Results Pending)   Echocardiogram Complete    (Results Pending)       PROCEDURES:   None.       I, Dot Brown, am serving as a scribe to document services personally performed by Lindsey Reddy PA-C based on my observation and the provider's statements to me. I, Lindsey Reddy PA-C attest that Dot Brown is acting in a scribe capacity, has observed my performance of the services and has documented them in accordance with my direction.    Lindsey Reddy PA-C  Emergency Medicine  [facility]  3/20/2024       Lindsey Reddy PA-C  03/20/24 2222       Lindsey Reddy PA-C  03/20/24 2222

## 2024-03-20 NOTE — TELEPHONE ENCOUNTER
"Nurse Triage SBAR    Situation: Left calf pain and chest pressure    Background:   -Patient calling  -It is okay to call back and leave a detailed message at this number:    Assessment:     -no difficulty breathing  -no chest pain    Recommendation: Go to ED. Pt is already on her way to ED in [place].     -Plans to follow recommendations  -Call back with and questions, concerns, or any change in symptoms           Reason for Disposition   Difficulty breathing    Additional Information   Negative: SEVERE difficulty breathing (e.g., struggling for each breath, speaks in single words)   Negative: Difficult to awaken or acting confused (e.g., disoriented, slurred speech)   Negative: Shock suspected (e.g., cold/pale/clammy skin, too weak to stand, low BP, rapid pulse)   Negative: Passed out (i.e., lost consciousness, collapsed and was not responding)   Negative: Chest pain lasting longer than 5 minutes and ANY of the following:    history of heart disease  (i.e., heart attack, bypass surgery, angina, angioplasty, CHF; not just a heart murmur)    described as crushing, pressure-like, or heavy    age > 50    age > 30 AND at least one cardiac risk factor (i.e., hypertension, diabetes, obesity, smoker or strong family history of heart disease)    not relieved with nitroglycerin   Negative: Heart beating < 50 beats per minute OR > 140 beats per minute   Negative: Visible sweat on face or sweat dripping down face   Negative: Sounds like a life-threatening emergency to the triager   Negative: SEVERE chest pain   Negative: [1] Chest pain (or \"angina\") comes and goes AND [2] is happening more often (increasing in frequency) or getting worse (increasing in severity)  (Exception: Chest pains that last only a few seconds.)   Negative: Pain also in shoulder(s) or arm(s) or jaw  (Exception: Pain is clearly made worse by movement.)    Protocols used: Chest Pain-A-AH    "

## 2024-03-20 NOTE — LETTER
[facility] ICU [address] [address]  Phone: [phone]  Fax: [phone]    March 22, 2024        Breanna Fischer  [address] [place] [address]    To whom it may concern:    RE: Breanna Fischer    Patient was treated at our hospital and missed work on March 21-22, 2024.  She may return to work on March 25, 2024.    Please contact me for questions or concerns.    Sincerely,    Rachel Read MD   Hospital Medicine Service   [phone]

## 2024-03-21 ENCOUNTER — TRANSFERRED RECORDS (OUTPATIENT)
Dept: HEALTH INFORMATION MANAGEMENT | Facility: CLINIC | Age: 49
End: 2024-03-21

## 2024-03-21 ENCOUNTER — APPOINTMENT (OUTPATIENT)
Dept: CARDIOLOGY | Facility: HOSPITAL | Age: 49
DRG: 270 | End: 2024-03-21
Attending: HOSPITALIST
Payer: COMMERCIAL

## 2024-03-21 ENCOUNTER — APPOINTMENT (OUTPATIENT)
Dept: ULTRASOUND IMAGING | Facility: HOSPITAL | Age: 49
DRG: 270 | End: 2024-03-21
Attending: HOSPITALIST
Payer: COMMERCIAL

## 2024-03-21 ENCOUNTER — APPOINTMENT (OUTPATIENT)
Dept: INTERVENTIONAL RADIOLOGY/VASCULAR | Facility: HOSPITAL | Age: 49
DRG: 270 | End: 2024-03-21
Attending: NURSE PRACTITIONER
Payer: COMMERCIAL

## 2024-03-21 PROBLEM — R73.03 PREDIABETES: Status: ACTIVE | Noted: 2021-11-26

## 2024-03-21 PROBLEM — E66.01 MORBID OBESITY (H): Status: ACTIVE | Noted: 2018-08-18

## 2024-03-21 PROBLEM — I82.411 ACUTE DEEP VEIN THROMBOSIS (DVT) OF FEMORAL VEIN OF RIGHT LOWER EXTREMITY (H): Status: ACTIVE | Noted: 2024-03-21

## 2024-03-21 LAB
ACT BLD: 224 SECONDS (ref 74–150)
ALBUMIN SERPL BCG-MCNC: 3.6 G/DL (ref 3.5–5.2)
ALP SERPL-CCNC: 114 U/L (ref 40–150)
ALT SERPL W P-5'-P-CCNC: 30 U/L (ref 0–50)
ANION GAP SERPL CALCULATED.3IONS-SCNC: 8 MMOL/L (ref 7–15)
AST SERPL W P-5'-P-CCNC: 40 U/L (ref 0–45)
AT III ACT/NOR PPP CHRO: 92 % (ref 85–135)
ATRIAL RATE - MUSE: 118 BPM
B2 GLYCOPROT1 IGG SERPL IA-ACNC: 1.9 U/ML
B2 GLYCOPROT1 IGM SERPL IA-ACNC: 3.5 U/ML
BASOPHILS # BLD AUTO: 0 10E3/UL (ref 0–0.2)
BASOPHILS NFR BLD AUTO: 1 %
BILIRUB SERPL-MCNC: 0.4 MG/DL
BUN SERPL-MCNC: 13 MG/DL (ref 6–20)
CALCIUM SERPL-MCNC: 8.6 MG/DL (ref 8.6–10)
CARDIOLIPIN IGG SER IA-ACNC: <2 GPL-U/ML
CARDIOLIPIN IGG SER IA-ACNC: NEGATIVE
CARDIOLIPIN IGM SER IA-ACNC: 8 MPL-U/ML
CARDIOLIPIN IGM SER IA-ACNC: NEGATIVE
CHLORIDE SERPL-SCNC: 106 MMOL/L (ref 98–107)
CREAT SERPL-MCNC: 0.58 MG/DL (ref 0.51–0.95)
DEPRECATED HCO3 PLAS-SCNC: 25 MMOL/L (ref 22–29)
DIASTOLIC BLOOD PRESSURE - MUSE: 105 MMHG
EGFRCR SERPLBLD CKD-EPI 2021: >90 ML/MIN/1.73M2
EOSINOPHIL # BLD AUTO: 0.1 10E3/UL (ref 0–0.7)
EOSINOPHIL NFR BLD AUTO: 2 %
ERYTHROCYTE [DISTWIDTH] IN BLOOD BY AUTOMATED COUNT: 12 % (ref 10–15)
ERYTHROCYTE [DISTWIDTH] IN BLOOD BY AUTOMATED COUNT: 12.2 % (ref 10–15)
FACTOR 2 INTERPRETATION: NORMAL
FACTOR V INTERPRETATION: NORMAL
GLUCOSE BLDC GLUCOMTR-MCNC: 119 MG/DL (ref 70–99)
GLUCOSE BLDC GLUCOMTR-MCNC: 120 MG/DL (ref 70–99)
GLUCOSE BLDC GLUCOMTR-MCNC: 138 MG/DL (ref 70–99)
GLUCOSE BLDC GLUCOMTR-MCNC: 142 MG/DL (ref 70–99)
GLUCOSE SERPL-MCNC: 130 MG/DL (ref 70–99)
HCT VFR BLD AUTO: 40.2 % (ref 35–47)
HCT VFR BLD AUTO: 42 % (ref 35–47)
HGB BLD-MCNC: 13.4 G/DL (ref 11.7–15.7)
HGB BLD-MCNC: 14.2 G/DL (ref 11.7–15.7)
HOLD SPECIMEN: NORMAL
IMM GRANULOCYTES # BLD: 0 10E3/UL
IMM GRANULOCYTES NFR BLD: 0 %
INTERPRETATION ECG - MUSE: NORMAL
LAB DIRECTOR COMMENTS: NORMAL
LAB DIRECTOR DISCLAIMER: NORMAL
LAB DIRECTOR INTERPRETATION: NORMAL
LAB DIRECTOR METHODOLOGY: NORMAL
LAB DIRECTOR RESULTS: NORMAL
LOCATION OF TASK: NORMAL
LVEF ECHO: NORMAL
LYMPHOCYTES # BLD AUTO: 2.2 10E3/UL (ref 0.8–5.3)
LYMPHOCYTES NFR BLD AUTO: 31 %
MCH RBC QN AUTO: 30.2 PG (ref 26.5–33)
MCH RBC QN AUTO: 30.4 PG (ref 26.5–33)
MCHC RBC AUTO-ENTMCNC: 33.3 G/DL (ref 31.5–36.5)
MCHC RBC AUTO-ENTMCNC: 33.8 G/DL (ref 31.5–36.5)
MCV RBC AUTO: 90 FL (ref 78–100)
MCV RBC AUTO: 91 FL (ref 78–100)
MONOCYTES # BLD AUTO: 0.4 10E3/UL (ref 0–1.3)
MONOCYTES NFR BLD AUTO: 6 %
NEUTROPHILS # BLD AUTO: 4.2 10E3/UL (ref 1.6–8.3)
NEUTROPHILS NFR BLD AUTO: 60 %
NRBC # BLD AUTO: 0 10E3/UL
NRBC BLD AUTO-RTO: 0 /100
P AXIS - MUSE: 77 DEGREES
PLATELET # BLD AUTO: 200 10E3/UL (ref 150–450)
PLATELET # BLD AUTO: 206 10E3/UL (ref 150–450)
POTASSIUM SERPL-SCNC: 4 MMOL/L (ref 3.4–5.3)
PR INTERVAL - MUSE: 156 MS
PROT SERPL-MCNC: 6.6 G/DL (ref 6.4–8.3)
QRS DURATION - MUSE: 86 MS
QT - MUSE: 324 MS
QTC - MUSE: 454 MS
R AXIS - MUSE: 84 DEGREES
RADIOLOGIST FLAGS: ABNORMAL
RBC # BLD AUTO: 4.43 10E6/UL (ref 3.8–5.2)
RBC # BLD AUTO: 4.67 10E6/UL (ref 3.8–5.2)
SODIUM SERPL-SCNC: 139 MMOL/L (ref 135–145)
SPECIMEN DESCRIPTION: NORMAL
SYSTOLIC BLOOD PRESSURE - MUSE: 193 MMHG
T AXIS - MUSE: 65 DEGREES
UFH PPP CHRO-ACNC: 0.47 IU/ML
UFH PPP CHRO-ACNC: 0.5 IU/ML
VENTRICULAR RATE- MUSE: 118 BPM
WBC # BLD AUTO: 7 10E3/UL (ref 4–11)
WBC # BLD AUTO: 9 10E3/UL (ref 4–11)

## 2024-03-21 PROCEDURE — 36415 COLL VENOUS BLD VENIPUNCTURE: CPT | Performed by: HOSPITALIST

## 2024-03-21 PROCEDURE — 250N000011 HC RX IP 250 OP 636: Performed by: RADIOLOGY

## 2024-03-21 PROCEDURE — 272N000117 HC CATH CR2

## 2024-03-21 PROCEDURE — 06CF3ZZ EXTIRPATION OF MATTER FROM RIGHT EXTERNAL ILIAC VEIN, PERCUTANEOUS APPROACH: ICD-10-PCS | Performed by: RADIOLOGY

## 2024-03-21 PROCEDURE — C1769 GUIDE WIRE: HCPCS

## 2024-03-21 PROCEDURE — 99233 SBSQ HOSP IP/OBS HIGH 50: CPT | Performed by: INTERNAL MEDICINE

## 2024-03-21 PROCEDURE — 255N000002 HC RX 255 OP 636: Performed by: INTERNAL MEDICINE

## 2024-03-21 PROCEDURE — 85306 CLOT INHIBIT PROT S FREE: CPT | Performed by: HOSPITALIST

## 2024-03-21 PROCEDURE — 06CM3ZZ EXTIRPATION OF MATTER FROM RIGHT FEMORAL VEIN, PERCUTANEOUS APPROACH: ICD-10-PCS | Performed by: RADIOLOGY

## 2024-03-21 PROCEDURE — 250N000011 HC RX IP 250 OP 636: Performed by: INTERNAL MEDICINE

## 2024-03-21 PROCEDURE — 99255 IP/OBS CONSLTJ NEW/EST HI 80: CPT | Performed by: INTERNAL MEDICINE

## 2024-03-21 PROCEDURE — C1757 CATH, THROMBECTOMY/EMBOLECT: HCPCS

## 2024-03-21 PROCEDURE — 85300 ANTITHROMBIN III ACTIVITY: CPT | Performed by: HOSPITALIST

## 2024-03-21 PROCEDURE — C1725 CATH, TRANSLUMIN NON-LASER: HCPCS

## 2024-03-21 PROCEDURE — 86301 IMMUNOASSAY TUMOR CA 19-9: CPT | Performed by: INTERNAL MEDICINE

## 2024-03-21 PROCEDURE — 93306 TTE W/DOPPLER COMPLETE: CPT | Mod: 26 | Performed by: STUDENT IN AN ORGANIZED HEALTH CARE EDUCATION/TRAINING PROGRAM

## 2024-03-21 PROCEDURE — 82378 CARCINOEMBRYONIC ANTIGEN: CPT | Performed by: INTERNAL MEDICINE

## 2024-03-21 PROCEDURE — 85520 HEPARIN ASSAY: CPT | Performed by: EMERGENCY MEDICINE

## 2024-03-21 PROCEDURE — 85025 COMPLETE CBC W/AUTO DIFF WBC: CPT | Performed by: EMERGENCY MEDICINE

## 2024-03-21 PROCEDURE — 272N000500 HC NEEDLE CR2

## 2024-03-21 PROCEDURE — C8929 TTE W OR WO FOL WCON,DOPPLER: HCPCS

## 2024-03-21 PROCEDURE — 255N000002 HC RX 255 OP 636: Performed by: RADIOLOGY

## 2024-03-21 PROCEDURE — 250N000011 HC RX IP 250 OP 636: Performed by: NURSE PRACTITIONER

## 2024-03-21 PROCEDURE — 93970 EXTREMITY STUDY: CPT

## 2024-03-21 PROCEDURE — 250N000009 HC RX 250: Performed by: HOSPITALIST

## 2024-03-21 PROCEDURE — 86146 BETA-2 GLYCOPROTEIN ANTIBODY: CPT | Performed by: HOSPITALIST

## 2024-03-21 PROCEDURE — 86147 CARDIOLIPIN ANTIBODY EA IG: CPT | Performed by: HOSPITALIST

## 2024-03-21 PROCEDURE — 250N000011 HC RX IP 250 OP 636: Performed by: EMERGENCY MEDICINE

## 2024-03-21 PROCEDURE — 067C3ZZ DILATION OF RIGHT COMMON ILIAC VEIN, PERCUTANEOUS APPROACH: ICD-10-PCS | Performed by: RADIOLOGY

## 2024-03-21 PROCEDURE — 85303 CLOT INHIBIT PROT C ACTIVITY: CPT | Performed by: HOSPITALIST

## 2024-03-21 PROCEDURE — 81241 F5 GENE: CPT | Performed by: HOSPITALIST

## 2024-03-21 PROCEDURE — 200N000001 HC R&B ICU

## 2024-03-21 PROCEDURE — 067F3ZZ DILATION OF RIGHT EXTERNAL ILIAC VEIN, PERCUTANEOUS APPROACH: ICD-10-PCS | Performed by: RADIOLOGY

## 2024-03-21 PROCEDURE — 86304 IMMUNOASSAY TUMOR CA 125: CPT | Performed by: INTERNAL MEDICINE

## 2024-03-21 PROCEDURE — G0452 MOLECULAR PATHOLOGY INTERPR: HCPCS | Mod: 26 | Performed by: STUDENT IN AN ORGANIZED HEALTH CARE EDUCATION/TRAINING PROGRAM

## 2024-03-21 PROCEDURE — 85390 FIBRINOLYSINS SCREEN I&R: CPT | Mod: 26 | Performed by: PATHOLOGY

## 2024-03-21 PROCEDURE — 80053 COMPREHEN METABOLIC PANEL: CPT | Performed by: HOSPITALIST

## 2024-03-21 PROCEDURE — 85347 COAGULATION TIME ACTIVATED: CPT

## 2024-03-21 PROCEDURE — 250N000013 HC RX MED GY IP 250 OP 250 PS 637: Performed by: INTERNAL MEDICINE

## 2024-03-21 PROCEDURE — 272N000302 HC DEVICE INFLATION CR5

## 2024-03-21 PROCEDURE — 85027 COMPLETE CBC AUTOMATED: CPT | Performed by: HOSPITALIST

## 2024-03-21 PROCEDURE — 250N000013 HC RX MED GY IP 250 OP 250 PS 637: Performed by: HOSPITALIST

## 2024-03-21 PROCEDURE — 999N000208 ECHOCARDIOGRAM COMPLETE

## 2024-03-21 PROCEDURE — 85613 RUSSELL VIPER VENOM DILUTED: CPT | Performed by: HOSPITALIST

## 2024-03-21 PROCEDURE — 85520 HEPARIN ASSAY: CPT | Performed by: HOSPITALIST

## 2024-03-21 PROCEDURE — 99152 MOD SED SAME PHYS/QHP 5/>YRS: CPT

## 2024-03-21 RX ORDER — LIDOCAINE 40 MG/G
CREAM TOPICAL
Status: DISCONTINUED | OUTPATIENT
Start: 2024-03-21 | End: 2024-03-21 | Stop reason: HOSPADM

## 2024-03-21 RX ORDER — NALOXONE HYDROCHLORIDE 0.4 MG/ML
0.4 INJECTION, SOLUTION INTRAMUSCULAR; INTRAVENOUS; SUBCUTANEOUS
Status: DISCONTINUED | OUTPATIENT
Start: 2024-03-21 | End: 2024-03-22 | Stop reason: HOSPADM

## 2024-03-21 RX ORDER — KETOROLAC TROMETHAMINE 30 MG/ML
30 INJECTION, SOLUTION INTRAMUSCULAR; INTRAVENOUS EVERY 6 HOURS PRN
Status: DISCONTINUED | OUTPATIENT
Start: 2024-03-21 | End: 2024-03-22 | Stop reason: HOSPADM

## 2024-03-21 RX ORDER — HEPARIN SODIUM 1000 [USP'U]/ML
6000 INJECTION, SOLUTION INTRAVENOUS; SUBCUTANEOUS ONCE
Status: COMPLETED | OUTPATIENT
Start: 2024-03-21 | End: 2024-03-21

## 2024-03-21 RX ORDER — FENTANYL CITRATE 50 UG/ML
25-50 INJECTION, SOLUTION INTRAMUSCULAR; INTRAVENOUS EVERY 5 MIN PRN
Status: DISCONTINUED | OUTPATIENT
Start: 2024-03-21 | End: 2024-03-21 | Stop reason: HOSPADM

## 2024-03-21 RX ORDER — NALOXONE HYDROCHLORIDE 0.4 MG/ML
0.4 INJECTION, SOLUTION INTRAMUSCULAR; INTRAVENOUS; SUBCUTANEOUS
Status: DISCONTINUED | OUTPATIENT
Start: 2024-03-21 | End: 2024-03-21 | Stop reason: HOSPADM

## 2024-03-21 RX ORDER — NICOTINE POLACRILEX 4 MG
15-30 LOZENGE BUCCAL
Status: DISCONTINUED | OUTPATIENT
Start: 2024-03-21 | End: 2024-03-22 | Stop reason: HOSPADM

## 2024-03-21 RX ORDER — TRAMADOL HYDROCHLORIDE 50 MG/1
50 TABLET ORAL EVERY 6 HOURS PRN
Status: DISCONTINUED | OUTPATIENT
Start: 2024-03-21 | End: 2024-03-21

## 2024-03-21 RX ORDER — NALOXONE HYDROCHLORIDE 0.4 MG/ML
0.2 INJECTION, SOLUTION INTRAMUSCULAR; INTRAVENOUS; SUBCUTANEOUS
Status: DISCONTINUED | OUTPATIENT
Start: 2024-03-21 | End: 2024-03-21 | Stop reason: HOSPADM

## 2024-03-21 RX ORDER — DEXTROSE MONOHYDRATE 25 G/50ML
25-50 INJECTION, SOLUTION INTRAVENOUS
Status: DISCONTINUED | OUTPATIENT
Start: 2024-03-21 | End: 2024-03-22 | Stop reason: HOSPADM

## 2024-03-21 RX ORDER — ONDANSETRON 2 MG/ML
4 INJECTION INTRAMUSCULAR; INTRAVENOUS
Status: DISCONTINUED | OUTPATIENT
Start: 2024-03-21 | End: 2024-03-21 | Stop reason: HOSPADM

## 2024-03-21 RX ORDER — OXYCODONE HYDROCHLORIDE 5 MG/1
5 TABLET ORAL EVERY 4 HOURS PRN
Status: DISCONTINUED | OUTPATIENT
Start: 2024-03-21 | End: 2024-03-22 | Stop reason: HOSPADM

## 2024-03-21 RX ORDER — OXYCODONE HYDROCHLORIDE 5 MG/1
10 TABLET ORAL EVERY 4 HOURS PRN
Status: DISCONTINUED | OUTPATIENT
Start: 2024-03-21 | End: 2024-03-22 | Stop reason: HOSPADM

## 2024-03-21 RX ORDER — NALOXONE HYDROCHLORIDE 0.4 MG/ML
0.2 INJECTION, SOLUTION INTRAMUSCULAR; INTRAVENOUS; SUBCUTANEOUS
Status: DISCONTINUED | OUTPATIENT
Start: 2024-03-21 | End: 2024-03-22 | Stop reason: HOSPADM

## 2024-03-21 RX ORDER — KETOROLAC TROMETHAMINE 15 MG/ML
15 INJECTION, SOLUTION INTRAMUSCULAR; INTRAVENOUS ONCE
Status: COMPLETED | OUTPATIENT
Start: 2024-03-21 | End: 2024-03-21

## 2024-03-21 RX ORDER — FLUMAZENIL 0.1 MG/ML
0.2 INJECTION, SOLUTION INTRAVENOUS
Status: DISCONTINUED | OUTPATIENT
Start: 2024-03-21 | End: 2024-03-21 | Stop reason: HOSPADM

## 2024-03-21 RX ADMIN — LOSARTAN POTASSIUM 25 MG: 25 TABLET, FILM COATED ORAL at 08:23

## 2024-03-21 RX ADMIN — FENTANYL CITRATE 50 MCG: 50 INJECTION, SOLUTION INTRAMUSCULAR; INTRAVENOUS at 15:24

## 2024-03-21 RX ADMIN — KETOROLAC TROMETHAMINE 30 MG: 30 INJECTION, SOLUTION INTRAMUSCULAR; INTRAVENOUS at 18:43

## 2024-03-21 RX ADMIN — MIDAZOLAM HYDROCHLORIDE 1 MG: 1 INJECTION, SOLUTION INTRAMUSCULAR; INTRAVENOUS at 15:31

## 2024-03-21 RX ADMIN — HEPARIN SODIUM 6000 UNITS: 1000 INJECTION INTRAVENOUS; SUBCUTANEOUS at 15:28

## 2024-03-21 RX ADMIN — PERFLUTREN 2 ML: 6.52 INJECTION, SUSPENSION INTRAVENOUS at 12:22

## 2024-03-21 RX ADMIN — MIDAZOLAM HYDROCHLORIDE 2 MG: 1 INJECTION, SOLUTION INTRAMUSCULAR; INTRAVENOUS at 15:20

## 2024-03-21 RX ADMIN — TRAMADOL HYDROCHLORIDE 50 MG: 50 TABLET, COATED ORAL at 13:39

## 2024-03-21 RX ADMIN — HEPARIN SODIUM AND DEXTROSE 1800 UNITS/HR: 10000; 5 INJECTION INTRAVENOUS at 23:09

## 2024-03-21 RX ADMIN — HEPARIN SODIUM AND DEXTROSE 1800 UNITS/HR: 10000; 5 INJECTION INTRAVENOUS at 12:00

## 2024-03-21 RX ADMIN — KETOROLAC TROMETHAMINE 15 MG: 15 INJECTION, SOLUTION INTRAMUSCULAR; INTRAVENOUS at 04:12

## 2024-03-21 RX ADMIN — FENTANYL CITRATE 50 MCG: 50 INJECTION, SOLUTION INTRAMUSCULAR; INTRAVENOUS at 15:35

## 2024-03-21 RX ADMIN — LIDOCAINE HYDROCHLORIDE 20 ML: 10 INJECTION, SOLUTION INFILTRATION; PERINEURAL at 15:34

## 2024-03-21 RX ADMIN — IOHEXOL 55 ML: 350 INJECTION, SOLUTION INTRAVENOUS at 16:00

## 2024-03-21 ASSESSMENT — ACTIVITIES OF DAILY LIVING (ADL)
ADLS_ACUITY_SCORE: 30
ADLS_ACUITY_SCORE: 26
ADLS_ACUITY_SCORE: 32
ADLS_ACUITY_SCORE: 26
ADLS_ACUITY_SCORE: 30
ADLS_ACUITY_SCORE: 26
ADLS_ACUITY_SCORE: 30
ADLS_ACUITY_SCORE: 30
ADLS_ACUITY_SCORE: 32
ADLS_ACUITY_SCORE: 32
ADLS_ACUITY_SCORE: 26
ADLS_ACUITY_SCORE: 32
ADLS_ACUITY_SCORE: 32
ADLS_ACUITY_SCORE: 26
ADLS_ACUITY_SCORE: 32
ADLS_ACUITY_SCORE: 26

## 2024-03-21 NOTE — PRE-PROCEDURE
GENERAL PRE-PROCEDURE:   Procedure:  Pulmonary angiogram with thrombectomy  Date/Time:  3/20/2024 9:06 PM    Risks and benefits: Risks, benefits and alternatives were discussed    Consent given by:  Patient  Patient states understanding of procedure being performed: Yes    Patient's understanding of procedure matches consent: Yes    Procedure consent matches procedure scheduled: Yes    Expected level of sedation:  Moderate  Appropriately NPO:  Yes  ASA Class:  3  Mallampati  :  Grade 2- soft palate, base of uvula, tonsillar pillars, and portion of posterior pharyngeal wall visible  Lungs:  Lungs clear with good breath sounds bilaterally  Heart:  Normal heart sounds and rate  History & Physical reviewed:  History and physical reviewed and no updates needed  Statement of review:  I have reviewed the lab findings, diagnostic data, medications, and the plan for sedation

## 2024-03-21 NOTE — PRE-PROCEDURE
GENERAL PRE-PROCEDURE:   Procedure:  RLE mechanical thrombectomy  Date/Time:  3/21/2024 11:35 AM    Risks and benefits: Risks, benefits and alternatives were discussed    Consent given by:  Patient  Patient states understanding of procedure being performed: Yes    Patient's understanding of procedure matches consent: Yes    Procedure consent matches procedure scheduled: Yes    Expected level of sedation:  Moderate  Appropriately NPO:  Yes  ASA Class:  3  Mallampati  :  Grade 2- soft palate, base of uvula, tonsillar pillars, and portion of posterior pharyngeal wall visible  Lungs:  Lungs clear with good breath sounds bilaterally  Heart:  Normal heart sounds and rate  History & Physical reviewed:  History and physical reviewed and no updates needed  Statement of review:  I have reviewed the lab findings, diagnostic data, medications, and the plan for sedation

## 2024-03-21 NOTE — PROGRESS NOTES
[facility]    Medicine Progress Note - Hospitalist Service    Date of Admission:  3/20/2024    Assessment & Plan    Patient is a 48-year-old female with history of hypertension who presented with left calf pain, chest pressure and shortness of breath and found to have saddle PE with right heart strain.      #Large saddle PE.  S/p pulmonary angiogram with thrombectomy 3/20/2024.  On heparin drip.  #Multiple bilateral pulmonary emboli  #Right heart strain  # Right occlusive DVT involving right external iliac and common femoral veins.  No superficial thrombophlebitis.  S/p RLE mechanical thrombectomy 3/21/2024.  Echo 3/21 showed LVEF 60-65% concentric LVH hypertrophy, no WMA, flattened septum consistent with RV pressure/volume overload.  RV mildly dilated, with normal systolic function.  Deep in ICU post pulmonary and right leg thrombectomy was  IV heparin gtt  Unclear etiology, no prior history of blood clots and no family history of clotting disorder, takes no birth control or other hormonal treatments.  Denies smoking.  Admits to sedentary lifestyle due to occupation, not wearing compression stockings.  Hypercoaguable work up on initial blood prior to anticoagulation, results pending  HemOnc consult requested by admitting provider    #History of positive Cologuard in January 2024.  She has colonoscopy scheduled for June.  No personal family history of colon cancer.  C CEA added to a.m. labs.    # Essential hypertension  Resume home losartan  As needed IV hydralazine    #Prediabetes  A1c 6/2023 5.9%, update  Sliding scale insulin    # Morbid obesity due to excessive calories.Body mass index is 42.57 kg/m .  Patient would benefit from lifestyle modifications to obtain healthy weight.  Consider RD consult for education on calorie reduced diet.    Diet: NPO per Anesthesia Guidelines for Procedure/Surgery Except for: Meds    DVT Prophylaxis: heparin drip  Daniel Catheter: Not present  Lines: None  "    Cardiac Monitoring: ACTIVE order. Indication: ICU  Code Status: Full Code      Clinically Significant Risk Factors Present on Admission   # Hypertension: Noted on problem list      # Severe Obesity: Estimated body mass index is 42.57 kg/m  as calculated from the following:    Height as of this encounter: 1.727 m (5' 8\").    Weight as of this encounter: 127 kg (280 lb).           Disposition Plan   Expected Discharge Date: 03/23/2024             Rachel Read MD  Hospitalist Service  [facility]  Securely message with CU[employer] (more info)  Text page via [facility] Paging/Directory   ______________________________________________________________________    Interval History   Patient is new to me.  Chart reviewed.  Patient was seen and examined.  She states feeling grateful and hopeful of treatment successful outcome.  She denies chest pain, cardiac palpitations, lightheadedness.  She does complain of right leg fullness/tenderness.  DVT showed right femoral/external iliac DVTs.  S/p mechanical thrombectomy today.    Physical Exam   Vital Signs: Temp: 97.8  F (36.6  C) Temp src: Oral BP: (!) 145/70 Pulse: 81   Resp: 14 SpO2: 94 % O2 Device: Nasal cannula Oxygen Delivery: 1 LPM  Weight: 280 lbs 0 oz  General: Alert and oriented x 3. Not in obvious distress.  HEENT: NC, AT. Neck- supple, No JVP elevation, lymphadenopathy or thyromegaly. Trachea-central.  Chest: Clear to auscultation bilaterally.  Heart: S1S2 regular. No M/R/G.  Abdomen: Soft. NT, ND. No organomegaly. Bowel sounds- active.  Back: No spine tenderness. No CVA tenderness.  Extremities: No leg swelling. Peripheral pulses 2+ bilaterally.  Neuro: Cranial nerves 1-12 grossly normal. No focal neurological deficit    Medical Decision Making       50 MINUTES SPENT BY ME on the date of service doing chart review, history, exam, documentation & further activities per the note.      Data     I have personally reviewed the following data over the " past 24 hrs:    7.0  \   13.4   / 200     139 106 13.0 /  142 (H)   4.0 25 0.58 \     ALT: 30 AST: 40 AP: 114 TBILI: 0.4   ALB: 3.6 TOT PROTEIN: 6.6 LIPASE: N/A     Trop: 69 (H) BNP: <36     TSH: 1.91 T4: N/A A1C: 6.1 (H)     INR:  0.99 PTT:  26   D-dimer:  N/A Fibrinogen:  N/A       Imaging results reviewed over the past 24 hrs:   Recent Results (from the past 24 hour(s))   CT Chest Pulmonary Embolism w Contrast   Result Value    Radiologist flags New diagnosis of pulmonary embolism (AA)    Narrative    EXAM: CT CHEST PULMONARY EMBOLISM W CONTRAST  LOCATION: [facility]  DATE: 3/20/2024    INDICATION: leg pain, shortness of breath, tachycardia and chest pressure  COMPARISON: None.  TECHNIQUE: CT chest pulmonary angiogram during arterial phase injection of IV contrast. Multiplanar reformats and MIP reconstructions were performed. Dose reduction techniques were used.   CONTRAST: isovue 370 90ml    FINDINGS:  ANGIOGRAM CHEST: Large saddle pulmonary embolus is present. Multiple pulmonary emboli are present bilaterally, right greater than left, extending from the lobar branches into the segmental branches. Pulmonary arteries appear mildly dilated. Thoracic   aorta normal in caliber. No aortic dissection.    HEART: Right atrium and ventricle are mildly dilated. The interatrial septum is bowed to the left. No pericardial effusion. No coronary artery calcification.    MEDIASTINUM: No adenopathy or mass.    LUNGS AND PLEURA: No pulmonary mass, consolidation, or suspicious pulmonary nodule. No pleural effusion or pneumothorax.    LIMITED UPPER ABDOMEN: Prior cholecystectomy.    MUSCULOSKELETAL: Negative.      Impression    IMPRESSION:  1.  Multiple pulmonary emboli bilaterally including large saddle pulmonary embolus. Elevated right heart pressure.      [Critical Result: New diagnosis of pulmonary embolism]    Finding was identified on 3/20/2024 7:40 PM CDT.     Physician assistant Lindsey Reddy was  contacted by me on 3/20/2024 7:45 PM CDT and verbalized understanding of the critical result.   IR Pulmonary Angiogram Bilateral    Narrative    [facility]  LOCATION: [facility]  DATE: 03/20/2024    PROCEDURE:   1.  SELECTIVE AND SUPERSELECTIVE BILATERAL PULMONARY ARTERIOGRAPHY.  2.  EXTIRPATION OF MATTER FROM THE BILATERAL PULMONARY ARTERIES, PERCUTANEOUS APPROACH.  3.  INTRA-ARTERIAL PRESSURE MEASUREMENTS.  4.  ULTRASOUND GUIDANCE FOR VASCULAR ACCESS.  5.  CLOSURE DEVICE.  6.  MODERATE SEDATION.    INTERVENTIONAL RADIOLOGIST: Nathan Joseph M.D.    INDICATION: 48-year-old female with bilateral pulmonary emboli including saddle embolus as well as with CT evidence of possible right heart strain. Pulmonary embolectomy is requested.    CONSENT: The risks, benefits and alternatives of the stated procedure were discussed with the patient in detail. All questions were answered. Informed consent was given to proceed with the procedure.    MODERATE SEDATION: Versed 3.5 mg IV; Fentanyl 175 mcg IV. During the time out, immediately prior to the administration of medications, the patient was reassessed for adequacy to receive conscious sedation.  Under physician supervision, Versed and   fentanyl were administered for moderate sedation. Pulse oximetry, heart rate and blood pressure were continuously monitored by an independent trained observer. The physician spent 53 minutes of face-to-face sedation time with the patient.    CONTRAST: 75 mL Omni 350  ANTIBIOTICS: None.  ADDITIONAL MEDICATIONS: 4000 units IV heparin    FLUOROSCOPIC TIME: 8.6 minutes.  RADIATION DOSE: Air Kerma: 205 mGy.    COMPLICATIONS: No immediate complications.    STERILE BARRIER TECHNIQUE: Maximum sterile barrier technique was used. Cutaneous antisepsis was performed at the operative site with application of 2% chlorhexidine and large sterile drape. Prior to the procedure, the  and assistant performed   hand hygiene and wore hat,  mask, sterile gown, and sterile gloves during the entire procedure.    PROCEDURE:    The procedure, including the risks, benefits, and alternatives to the procedure itself were discussed with the patient. When all of their questions were answered informed written and verbal consent was obtained. The patient was then brought to the   Interventional Radiology suite, placed in a supine position, and the patient's right groin was sterilely prepped and draped. The right common femoral vein was noted to be ultrasound patent. After giving local anesthesia with lidocaine, the right common   femoral vein was punctured with a 21 gauge needle, under ultrasound guidance with a permanent image stored. A 0.018 inch wire advanced through the needle into the external iliac artery under fluoroscopic guidance. The needle was then exchanged over the   wire for a 4 American coaxial dilator. The inner 3 American dilator and 0.018 inch wire were then exchanged for a 0.035 inch guidewire. The outer 4 American dilator was then exchanged over the guidewire for a 6 American vascular sheath.      Utilizing preclose technique 2 Perclose suture devices were deployed at the right common femoral venous access site in standard fashion. The 6 American sheath was removed over a 0.035 inch Amplatz wire. Serial dilatation was performed to 20 American followed   by a 24 American dry seal sheath.    A 6 American Grollman-type catheter was advanced over a 0.035 inch Bentson wire to the level of the right atrium followed by the right ventricle where pressure measurements were obtained. The catheter was then manipulated into the right pulmonary artery   where pressure measurements were obtained. Digital subtraction right sided angiography was performed.    A 0.035 inch 1 cm floppy tip Amplatz wire was manipulated into the right lower lobe segmental branch (greater than third order). A 24 American FlowTriever catheter was advanced into the main right pulmonary artery and  primary percutaneous transluminal   mechanical thrombectomy (extirpation of matter) was performed with removal of a large amount of thrombus from the main right and right lower and right upper lobe pulmonary arteries. Pressure measurement was obtained.     The catheter was retracted in the main pulmonary artery. A 5 Iraqi KMP catheter, with the aid of a 0.035 inch angled Glidewire, was directed through the 24 Iraqi catheter and directed into the left pulmonary artery. Pressure measurements were obtained.   A pulmonary arteriogram was obtained. Next the Amplatz wire was removed from the left pulmonary artery and advanced through the KMP catheter into the left pulmonary artery. Following this the KMP catheter was exchanged for the dilator of the 24 Iraqi   catheter. This was advanced into the left pulmonary artery. The 24 Iraqi FlowTriever catheter was advanced into the main left pulmonary artery and primary percutaneous transluminal mechanical thrombectomy (extirpation of matter) was performed with   removal of a minimal amount of thrombus from the left mid and left lower pulmonary arteries.     Pressure measurements were obtained throughout as the catheter was removed.    The catheters and sheaths were removed. Hemostasis was achieved via the previously placed Perclose suture devices.    FINDINGS:  Ultrasound demonstrates a patent and fully compressible right common femoral vein. A permanent image was stored.    The digital subtraction pulmonary arteriography shows thrombus within the bilateral pulmonary arteries.         Impression    IMPRESSION:      1.  Primary percutaneous transluminal mechanical thrombectomy (extirpation of matter) from the bilateral pulmonary arterial systems as detailed above.    PULMONARY ARTERIAL PRESSURES (mmHg):  Right atrium prior to thrombectomy: 5  right atrium following thrombectomy: 4    Right ventricle prior to thrombectomy: 23  right ventricle following thrombectomy: 13    Main  pulmonary artery prior to thrombectomy: 28  Main pulmonary artery following thrombectomy: 18    Right pulmonary artery prior to thrombectomy: 31  Right pulmonary artery following thrombectomy: 16    Left pulmonary artery prior to thrombectomy: 26  Left pulmonary artery following thrombectomy: 10                 US Lower Extremity Venous Duplex Bilateral   Result Value    Radiologist flags (AA)     Right external iliac/common femoral DVT, and left popliteal, posterior tibial and peroneal DVT.    Narrative    EXAM: US LOWER EXTREMITY VENOUS DUPLEX BILATERAL  LOCATION: [facility]  DATE: 3/21/2024    INDICATION: Saddle PE, LLE swelling.  COMPARISON: CT pulmonary angiogram 03/20/2024.  TECHNIQUE: Venous Duplex ultrasound of bilateral lower extremities with and without compression, augmentation and duplex. Color flow and spectral Doppler with waveform analysis performed.    FINDINGS: Exam includes the common femoral, femoral, popliteal veins as well as segmentally visualized deep calf veins and greater saphenous vein.     RIGHT: Occlusive DVT involving the right external iliac and the common femoral veins. No superficial thrombophlebitis. No popliteal cyst.    LEFT: Occlusive DVT involving the popliteal vein extending into the posterior tibial and peroneal veins to the mid/distal calf. No superficial thrombophlebitis. Elongated left popliteal cyst measures 12.2 x 3.1 x 0.7 cm.      Impression    IMPRESSION:  1.  On the right, occlusive DVT involving the external iliac and common femoral veins.    2.  On the left, occlusive DVT involving the popliteal vein extending into the posterior tibial and peroneal veins to the mid/distal calf.    3.  Elongated left popliteal cyst.      [Critical Result: Right external iliac/common femoral DVT, and left popliteal, posterior tibial and peroneal DVT.    Finding was identified on 3/21/2024 1:07 AM CDT.     Dr. Nunez was contacted by me on 3/21/2024 1:41 AM CDT and  verbalized understanding of the critical result.    Echocardiogram Complete   Result Value    LVEF  60-65%    Narrative    [id number]  [id number]  [id number]  [id number]^REUBEN^[place] [address]     Name: KIRK MARQUES  MRN: [id number]  : 1975  Study Date: 2024 11:09 AM  Age: 48 yrs  Gender: Female  Patient Location: [facility]  Reason For Study: Pulmonary Embolism  Ordering Physician: HOAGN AELXIS  Performed By: WENDIE     BSA: 2.4 m2  Height: 68 in  Weight: 280 lb  HR: 82  BP: 137/67 mmHg  ______________________________________________________________________________  Procedure  Complete Portable Echo Adult. Definity (NDC #93617-777) given intravenously.  Adequate quality two-dimensional was performed and interpreted.  ______________________________________________________________________________  Interpretation Summary     The left ventricle is normal in size. There is mild concentric left  ventricular hypertrophy.  Left ventricular systolic function is normal. The visual ejection fraction is  60-65%. No regional wall motion abnormalities noted.  Flattened septum is consistent with RV pressure/volume overload.     The right ventricle is mildly dilated. The right ventricular systolic function  is normal.  Normal left atrial size. Right atrial size is normal.  IVC diameter <2.1 cm collapsing >50% with sniff suggests a normal RA pressure  of 3 mmHg.     Compared to previous study from 2022 the RV appears more dilated, there  is septal flattening consistent with RV pressure/volume overload.  ______________________________________________________________________________  Left Ventricle  The left ventricle is normal in size. There is mild concentric left  ventricular hypertrophy. Left ventricular systolic function is normal. The  visual ejection fraction is 60-65%. Left ventricular diastolic function is  indeterminate. No regional wall motion abnormalities noted.  Flattened septum  is consistent with RV pressure/volume overload.     Right Ventricle  The right ventricle is mildly dilated. The right ventricular systolic function  is normal.     Atria  Normal left atrial size. Right atrial size is normal.     Mitral Valve  Mitral valve leaflets appear normal. There is mild mitral annular  calcification. There is trace mitral regurgitation. There is no mitral valve  stenosis.     Tricuspid Valve  Tricuspid valve leaflets appear normal. There is trace tricuspid  regurgitation. Right ventricular systolic pressure could not be approximated  due to inadequate tricuspid regurgitation.     Aortic Valve  The aortic valve is trileaflet. Aortic valve leaflets appear normal. No aortic  regurgitation is present. No aortic stenosis is present.     Pulmonic Valve  The pulmonic valve is not well visualized. There is trace pulmonic valvular  regurgitation.     Vessels  The aorta root is normal. IVC diameter <2.1 cm collapsing >50% with sniff  suggests a normal RA pressure of 3 mmHg.     Pericardium  There is no pericardial effusion.     Rhythm  Sinus rhythm was noted.  ______________________________________________________________________________  MMode/2D Measurements & Calculations     IVSd: 1.2 cm  LVIDd: 4.0 cm  LVIDs: 2.7 cm  LVPWd: 1.2 cm  FS: 33.2 %  LV mass(C)d: 168.1 grams  LV mass(C)dI: 71.2 grams/m2  Ao root diam: 4.1 cm  asc Aorta Diam: 3.4 cm  LVOT diam: 2.4 cm  LVOT area: 4.5 cm2  Ao root diam index Ht(cm/m): 2.4  Ao root diam index BSA (cm/m2): 1.7  Asc Ao diam index BSA (cm/m2): 1.4  Asc Ao diam index Ht(cm/m): 2.0  LA Volume (BP): 62.1 ml     LA Volume Index (BP): 26.3 ml/m2  LA Volume Indexed (AL/bp): 27.4 ml/m2  RWT: 0.59     Time Measurements  MM HR: 91.0 BPM     Doppler Measurements & Calculations  MV E max enrico: 40.4 cm/sec  MV A max enrico: 65.0 cm/sec  MV E/A: 0.62  MV dec slope: 173.0 cm/sec2  MV dec time: 0.23 sec  Ao V2 max: 122.0 cm/sec  Ao max P.0 mmHg  Ao V2 mean:  76.0 cm/sec  Ao mean PG: 3.0 mmHg  Ao V2 VTI: 19.2 cm  TITI(I,D): 3.7 cm2  TITI(V,D): 3.3 cm2  LV V1 max PG: 3.2 mmHg  LV V1 max: 89.0 cm/sec  LV V1 VTI: 15.5 cm  SV(LVOT): 70.1 ml  SI(LVOT): 29.7 ml/m2  PA acc time: 0.06 sec  Pulm Sys Neal: 47.0 cm/sec  Pulm Tellez Neal: 37.1 cm/sec  Pulm A Revs Neal: 25.0 cm/sec  Pulm S/D: 1.3  AV Neal Ratio (DI): 0.73  TITI Index (cm2/m2): 1.5  E/E': 4.3  E/E' av.0  Lateral E/e': 3.7  Medial E/e': 4.3  Peak E' Neal: 9.5 cm/sec  RV S Neal: 10.6 cm/sec     ______________________________________________________________________________  Report approved by: Bethany Medina 2024 12:34 PM

## 2024-03-21 NOTE — PROGRESS NOTES
Interventional Radiology - Pre-Procedure Note:  Inpatient - [facility]  03/21/2024     Procedure Requested: RLE mechanical thrombectomy  Requested by: Dr. Ahuja    History and Physical Reviewed: H&P documented within 30 days (by Milad Clark,   on 3/20/24).  I have personally reviewed the patient's medical history and have updated the medical record as necessary.    Brief HPI: Breanna Fischer is a 48 year old female with past medical Hx of HTN, obesity presented to ED 3/20 for CP and SOB. Diagnostic work up revealed bilateral pulmonary emboli including saddle embolus as well as with CT evidence of possible right heart strain. S/p pulmonary mechanical thrombectomy. Further imagined demonstrated Bilateral LE DVT. Right DVT is Occlusive DVT involving the right external iliac and the common femoral veins. No superficial thrombophlebitis. Overall patient is feeling better today. She denies SOB. Does endorse left calf pain and sternal pain. Improves with position change. No issues with right groin access site overnight. Patient is tolerating heparin gtt. She has not mobilized today. On RA. Hemodynamically stable this AM.      IMAGING:  EXAM: US LOWER EXTREMITY VENOUS DUPLEX BILATERAL  LOCATION: [facility]  DATE: 3/21/2024     INDICATION: Saddle PE, LLE swelling.  COMPARISON: CT pulmonary angiogram 03/20/2024.  TECHNIQUE: Venous Duplex ultrasound of bilateral lower extremities with and without compression, augmentation and duplex. Color flow and spectral Doppler with waveform analysis performed.     FINDINGS: Exam includes the common femoral, femoral, popliteal veins as well as segmentally visualized deep calf veins and greater saphenous vein.      RIGHT: Occlusive DVT involving the right external iliac and the common femoral veins. No superficial thrombophlebitis. No popliteal cyst.     LEFT: Occlusive DVT involving the popliteal vein extending into the posterior  tibial and peroneal veins to the mid/distal calf. No superficial thrombophlebitis. Elongated left popliteal cyst measures 12.2 x 3.1 x 0.7 cm.                                                                      IMPRESSION:  1.  On the right, occlusive DVT involving the external iliac and common femoral veins.     2.  On the left, occlusive DVT involving the popliteal vein extending into the posterior tibial and peroneal veins to the mid/distal calf.     3.  Elongated left popliteal cyst.        [Critical Result: Right external iliac/common femoral DVT, and left popliteal, posterior tibial and peroneal DVT.     Finding was identified on 3/21/2024 1:07 AM CDT.      Dr. Nunez was contacted by me on 3/21/2024 1:41 AM CDT and verbalized understanding of the critical result.       [facility] [facility]  LOCATION: [facility]  DATE: 03/20/2024     PROCEDURE:   1.  SELECTIVE AND SUPERSELECTIVE BILATERAL PULMONARY ARTERIOGRAPHY.  2.  EXTIRPATION OF MATTER FROM THE BILATERAL PULMONARY ARTERIES, PERCUTANEOUS APPROACH.  3.  INTRA-ARTERIAL PRESSURE MEASUREMENTS.  4.  ULTRASOUND GUIDANCE FOR VASCULAR ACCESS.  5.  CLOSURE DEVICE.  6.  MODERATE SEDATION.     INTERVENTIONAL RADIOLOGIST: Nathan Joseph M.D.     INDICATION: 48-year-old female with bilateral pulmonary emboli including saddle embolus as well as with CT evidence of possible right heart strain. Pulmonary embolectomy is requested.     CONSENT: The risks, benefits and alternatives of the stated procedure were discussed with the patient in detail. All questions were answered. Informed consent was given to proceed with the procedure.     MODERATE SEDATION: Versed 3.5 mg IV; Fentanyl 175 mcg IV. During the time out, immediately prior to the administration of medications, the patient was reassessed for adequacy to receive conscious sedation.  Under physician supervision, Versed and   fentanyl were administered for moderate sedation. Pulse oximetry, heart rate and blood  pressure were continuously monitored by an independent trained observer. The physician spent 53 minutes of face-to-face sedation time with the patient.     CONTRAST: 75 mL Omni 350  ANTIBIOTICS: None.  ADDITIONAL MEDICATIONS: 4000 units IV heparin     FLUOROSCOPIC TIME: 8.6 minutes.  RADIATION DOSE: Air Kerma: 205 mGy.     COMPLICATIONS: No immediate complications.     STERILE BARRIER TECHNIQUE: Maximum sterile barrier technique was used. Cutaneous antisepsis was performed at the operative site with application of 2% chlorhexidine and large sterile drape. Prior to the procedure, the  and assistant performed   hand hygiene and wore hat, mask, sterile gown, and sterile gloves during the entire procedure.     PROCEDURE:    The procedure, including the risks, benefits, and alternatives to the procedure itself were discussed with the patient. When all of their questions were answered informed written and verbal consent was obtained. The patient was then brought to the   Interventional Radiology suite, placed in a supine position, and the patient's right groin was sterilely prepped and draped. The right common femoral vein was noted to be ultrasound patent. After giving local anesthesia with lidocaine, the right common   femoral vein was punctured with a 21 gauge needle, under ultrasound guidance with a permanent image stored. A 0.018 inch wire advanced through the needle into the external iliac artery under fluoroscopic guidance. The needle was then exchanged over the   wire for a 4 [place] Lucian coaxial dilator. The inner 3 [place] Lucian dilator and 0.018 inch wire were then exchanged for a 0.035 inch guidewire. The outer 4 [place] Lucian dilator was then exchanged over the guidewire for a 6 [place] Lucian vascular sheath.       Utilizing preclose technique 2 Perclose suture devices were deployed at the right common femoral venous access site in standard fashion. The 6 [place] Lucian sheath was removed over a 0.035 inch Amplatz wire. Serial  dilatation was performed to 20 Palauan followed   by a 24 Palauan dry seal sheath.     A 6 Palauan Grollman-type catheter was advanced over a 0.035 inch Bentson wire to the level of the right atrium followed by the right ventricle where pressure measurements were obtained. The catheter was then manipulated into the right pulmonary artery   where pressure measurements were obtained. Digital subtraction right sided angiography was performed.     A 0.035 inch 1 cm floppy tip Amplatz wire was manipulated into the right lower lobe segmental branch (greater than third order). A 24 Palauan FlowTriever catheter was advanced into the main right pulmonary artery and primary percutaneous transluminal   mechanical thrombectomy (extirpation of matter) was performed with removal of a large amount of thrombus from the main right and right lower and right upper lobe pulmonary arteries. Pressure measurement was obtained.      The catheter was retracted in the main pulmonary artery. A 5 Palauan KMP catheter, with the aid of a 0.035 inch angled Glidewire, was directed through the 24 Palauan catheter and directed into the left pulmonary artery. Pressure measurements were obtained.   A pulmonary arteriogram was obtained. Next the Amplatz wire was removed from the left pulmonary artery and advanced through the KMP catheter into the left pulmonary artery. Following this the KMP catheter was exchanged for the dilator of the 24 Palauan   catheter. This was advanced into the left pulmonary artery. The 24 Palauan FlowTriever catheter was advanced into the main left pulmonary artery and primary percutaneous transluminal mechanical thrombectomy (extirpation of matter) was performed with   removal of a minimal amount of thrombus from the left mid and left lower pulmonary arteries.      Pressure measurements were obtained throughout as the catheter was removed.     The catheters and sheaths were removed. Hemostasis was achieved via the previously placed  "Perclose suture devices.     FINDINGS:  Ultrasound demonstrates a patent and fully compressible right common femoral vein. A permanent image was stored.     The digital subtraction pulmonary arteriography shows thrombus within the bilateral pulmonary arteries.                                                                          IMPRESSION:       1.  Primary percutaneous transluminal mechanical thrombectomy (extirpation of matter) from the bilateral pulmonary arterial systems as detailed above.     PULMONARY ARTERIAL PRESSURES (mmHg):  Right atrium prior to thrombectomy: 5  right atrium following thrombectomy: 4     Right ventricle prior to thrombectomy: 23  right ventricle following thrombectomy: 13     Main pulmonary artery prior to thrombectomy: 28  Main pulmonary artery following thrombectomy: 18     Right pulmonary artery prior to thrombectomy: 31  Right pulmonary artery following thrombectomy: 16     Left pulmonary artery prior to thrombectomy: 26  Left pulmonary artery following thrombectomy: 10    NPO: since MN  ANTICOAGULANTS: heparin gtt-no hold required  ANTIBIOTICS: not indicated    ALLERGIES  Allergies   Allergen Reactions    Lisinopril Cough    Tea Tree Oil Rash         LABS:  INR   Date Value Ref Range Status   03/20/2024 0.99 0.85 - 1.15 Final      Hemoglobin   Date Value Ref Range Status   03/21/2024 13.4 11.7 - 15.7 g/dL Final   08/18/2018 13.3 11.7 - 15.7 g/dL Final     Platelet Count   Date Value Ref Range Status   03/21/2024 200 150 - 450 10e3/uL Final   08/18/2018 224 150 - 450 10e9/L Final     Creatinine   Date Value Ref Range Status   03/21/2024 0.58 0.51 - 0.95 mg/dL Final   06/05/2020 0.55 0.52 - 1.04 mg/dL Final     Potassium   Date Value Ref Range Status   03/21/2024 4.0 3.4 - 5.3 mmol/L Final   07/01/2022 4.5 3.4 - 5.3 mmol/L Final   06/05/2020 3.7 3.4 - 5.3 mmol/L Final         EXAM:  /67   Pulse 93   Temp 97.9  F (36.6  C) (Oral)   Resp 23   Ht 1.727 m (5' 8\")   Wt 127 kg " (280 lb)   SpO2 94%   BMI 42.57 kg/m    General: Stable. In no acute distress.    Neuro: Alert and oriented x 3. No focal deficits.  Psych: Appropriate mood and affect. Linear/coherent thought process.   Resp: Normal respirations. Lungs clear to auscultation bilaterally. RA  Cardio: S1S2, regular rate and rhythm, without murmur, clicks or rubs  Abdomen: Soft, non-distended, non-tender.  Vascular: +2/4 bilateral femoral pulses, +2/4 bilateral dorsalis pedis pulses, +2/4 bilateral posterior tibial pulses.  R groin site CDI. No evidence of hematoma.   Skin: Warm and dry. Without excoriations, ecchymosis, erythema, lesions or open sores on Bilateral legs. Lower ext are compressible. Minimal swelling noted.      Pre-Sedation Assessment:  Mallampati Airway Classification:  II - Faucial pillars and soft palate may be seen, but uvula is masked by the base of the tongue  Previous reaction to anesthesia/sedation:  No  Sedation plan based on assessment: Moderate (conscious) sedation  ASA Classification: Class 3 - SEVERE SYSTEMIC DISEASE, DEFINITE FUNCTIONAL LIMITATIONS.   Code Status: Full Code intra procedure, per discussion with patient.         ASSESSMENT/PLAN:   Breanna Fischer is a 48 year old female with past medical Hx of HTN, obesity presented to ED 3/20 for CP and SOB. Diagnostic work up revealed bilateral pulmonary emboli including saddle embolus as well as with CT evidence of possible right heart strain. S/p pulmonary mechanical thrombectomy. Further imagined demonstrated Bilateral LE DVT. Right DVT is Occlusive DVT involving the right external iliac and the common femoral veins. Patient meets criteria for RLE mechanical thrombectomy.      NPO  Plan for RLE venogram with possible intervention including mechanical thrombectomy with sedation    Procedural education reviewed with patient/family in detail including, but not limited to risks, benefits and alternatives with understanding verbalized by  patient/family.    Total time spent on the date of the encounter: 70 minutes.  Discussed with EVIN Velarde CNP  Interventional Radiology

## 2024-03-21 NOTE — INTERVAL H&P NOTE
"I have reviewed the surgical (or preoperative) H&P that is linked to this encounter, and examined the patient. There are no significant changes    Clinical Conditions Present on Arrival:  Clinically Significant Risk Factors Present on Admission                  # Severe Obesity: Estimated body mass index is 42.57 kg/m  as calculated from the following:    Height as of this encounter: 1.727 m (5' 8\").    Weight as of this encounter: 127 kg (280 lb).       "

## 2024-03-21 NOTE — ED PROVIDER NOTES
"EMERGENCY DEPARTMENT MIDLEVEL SUPERVISORY NOTE    Date/Time: 3/20/2024 7:49 PM    I am seeing this patient along with Lindsey Reddy PA-C.  I have seen and evaluated the patient independently at bedside and agree with the FRACISCO's history, assessment and plan.  I personally saw the patient and performed a substantive portion of the visit including all aspects of the medical decision making.      BRIEF HPI    Breanna Fischer is a 48 year old female with a pertinent history of morbid obesity, prediabetes and hypertension who presents to this ED by walk-in for evaluation of calf pain, which started on 3/16. Patient also notes 2 episodes of dizziness upon standing today and heart palpitations, chest pressure and difficulties breathing at around 4:30 PM today.     BRIEF PHYSICAL EXAM  Vitals: /63   Pulse 83   Temp 98.1  F (36.7  C) (Oral)   Resp 14   Ht 1.727 m (5' 8\")   Wt 127 kg (280 lb)   SpO2 90%   BMI 42.57 kg/m    General: Appears in no acute distress, awake, alert, interactive.  Eyes: Conjunctivae clear.   HENT: Atraumatic. MMM.   Neck: Full AROM.  Cardiovascular: Tachycardic, regular.   Respiratory/Chest: Slightly increased work of breathing. Tachypneic, Speaking in full sentences.  Abdomen: Non-distended.  Musculoskeletal: Normal appearing extremities without obvious deformities or signs of trauma. TTP left calf.   Skin: Normal color. No visible rash or diaphoresis.  Neurologic: Alert. Face symmetric, moves all extremities spontaneously. Speech clear.  Psychiatric: Affect appropriate, cooperative.    EKG  Performed at: 1845  Impression: Sinus tachycardia. No acute ST changes. No evidence of strain. Normal axis. Normal intervals. No previous for comparison.   Rate: 118  Rhythm: Sinus  QRS Interval: 86  QTc Interval: 454  Comparison: None  I have independently reviewed and interpreted the EKG(s) documented above.    ED COURSE  6:45 PM I received the patient report from the FRACISCO, Lindsey Reddy PA-C. I " agree with their assessment and plan of management, and I will see the patient.  7:40 PM I rechecked the patient and updated with results of CT scan and plans for admission.   7:50 PM I met with the patient to introduce myself, gather additional history, perform my initial exam, and discuss the plan.   9:30 PM Intensivist and hospitalist now both aware of patient.     MEDICAL DECISION MAKING    Pertinent Labs and Imaging reviewed (see chart for details)    Breanna Fischer is a 48 year old year old who presents for evaluation of chest pain, palpitations, dyspnea, and left leg pain.  Patient reports onset of symptoms 4 days ago when she started to experience some discomfort in her left calf.  Around 430 this afternoon, she had sudden onset of palpitations, dyspnea, and chest pressure.  On arrival, patient was tachycardic, hypertensive, but otherwise stable.  Presentation and vital sign abnormalities were certainly concerning for PE/DVT.  Also considered ACS/ischemia, unstable angina, pericarditis, myocarditis, CHF, viral illness, anemia, electrolyte derangement, arrhythmia, thyroid dysfunction, coagulopathy.    Patient was initially evaluated by PA who placed orders for labs, EKG, and expedited CT scan.  I-STAT creatinine was obtained and patient was sent over to radiology for scan.  I independently reviewed preliminary results which did show a large saddle PE.  This was confirmed by radiologist who did call the department with results and also noted evidence of right heart strain.  Both myself and RICHI Portillo met with the patient to update her with these results.      PA discussed the case with interventional radiology who reviewed images and agreed with plan for thrombectomy.  Patient was started on a heparin drip as well.    EKG with sinus tachycardia but no clear ischemic changes.  Initial troponin elevated at 69, likely related to right heart strain in the setting of PE.  BMP overall unremarkable.  CBC without  leukocytosis or acute anemia.  BMP within normal limits.    PA discussed case with hospitalist and intensivist.  Patient was transported directly from the ED to interventional radiology for thrombectomy with plans for transfer to the floor/ICU thereafter.      At conclusion of encounter I discussed results of all of tests and recommendation for disposition. All questions were answered. Patient acknowledged understanding and was agreeable with care plan.     Please see midlevel note for additional details. I personally saw the patient and performed a substantive portion of the visit including all aspects of the medical decision making.     FINAL IMPRESSION    Acute saddle pulmonary embolism with acute cor pulmonale (H)      CRITICAL CARE  Critical care 45 minutes excluding separately billable procedures.  Includes bedside management, time reviewing test results, review of records, discussing the case with staff, documenting the medical record and time spent with family members (or surrogate decision makers) discussing specific treatment issues.    I, Leni Melendez, am serving as a scribe to document services personally performed by Lindsey Urena MD, based on my observations and the provider's statements to me.  I, Lindsey Urena MD, attest that Leni Melendez is acting in a scribe capacity, has observed my performance of the services and has documented them in accordance with my direction.      Lindsey Urena MD  Emergency Medicine  3/20/2024 7:49 PM      Lindsey Urena MD  03/21/24 [id number]

## 2024-03-21 NOTE — PHARMACY-ADMISSION MEDICATION HISTORY
Pharmacist Admission Medication History    Admission medication history is complete. The information provided in this note is only as accurate as the sources available at the time of the update.    Information Source(s): Patient and CareEverywhere/SureScripts (with family present) via in-person    Pertinent Information: none    Changes made to PTA medication list:  Added: None  Deleted: triamcinolone 0.025% external ointment (pt not taking, original rx from March 2023)  Changed: added strength/sig to cetirizine    Allergies reviewed with patient and updates made in EHR: yes    Medication History Completed By: Nicollette McMann, RPH 3/20/2024 8:01 PM    PTA Med List   Medication Sig Last Dose    cetirizine (ZYRTEC) 5 MG tablet Take 5-10 mg by mouth daily as needed for allergies 3/20/2024 at 0800 (x1 dose)    losartan (COZAAR) 25 MG tablet TAKE ONE TABLET BY MOUTH ONCE DAILY 3/20/2024 at 0800

## 2024-03-21 NOTE — SEDATION DOCUMENTATION
Patient Name: Breanna Fischer  Medical Record Number: [id number]  Today's Date: 3/20/2024    Procedure: angiogram with thrombectomy for PE  Proceduralist: Dr. Joseph    Procedure Start: 21:48  Procedure end: 22:41  Sedation medications administered: 3.5 mg midazolam and 175 mcg fentanyl   Sedation time: 53 minutes    Report given to: Alicia [facility]HEL[facility] ICU    Other Notes: Pt arrived to IR room 1 from ER. Consent reviewed. Pt denies any questions or concerns regarding procedure. Pt positioned supine and monitored per protocol. Pt tolerated procedure without any noted complications. VSS on RA post procedure. Pt transferred to ICU.

## 2024-03-21 NOTE — PLAN OF CARE
Goal Outcome Evaluation:      Plan of Care Reviewed With: patient    Overall Patient Progress: improvingOverall Patient Progress: improving       [facility] - ICU    RN Progress Note:            Pertinent Assessments:      Please refer to flowsheet rows for full assessment     Pt remains on bedrest pending ultrasound results.  Pt NPO.  Gave prn Toradol x1 for pain.  Vitals stable          Key Events - This Shift:     Heparin with in range x1            Barriers to Discharge / Downgrade:     Multiple blood clots may need futher intervention        Point of Contact Update YES-OR-NO: No   If No, reason: with patient upon admission   Name:Akin   Phone Number:see chart   Summary of Conversation: Plan of Care

## 2024-03-21 NOTE — PROCEDURES
Interventional Radiology Post-Procedure Note    Procedure: Pulmonary angiogram with thrombectomy.    Attending: Nathan Joseph MD    Findings: Elevated pulmonary arterial pressures. Saddle embolus with successful thrombectomy. Resultant decrease in the patient's PA pressures and stabilization of hemodynamics.    Plan: Bedrest x 2 hours. Continue heparinization

## 2024-03-21 NOTE — H&P
"[facility]    History and Physical - Hospitalist Service       Date of Admission:  3/20/2024    Assessment & Plan    Patient is a 48-year-old female with history of hypertension who presented with left calf pain, chest pressure and shortness of breath and found to have saddle PE with right heart strain.      #Large saddle PE  #Multiple bilateral pulmonary emboli  #Right heart strain  #L calf pain, presumed DVT  IR consulted and patient going emergently for thrombectomy  Admit to ICU bed post-procedure  IV heparin gtt  Unclear etiology, no prior history of blood clots and no family history of clotting disorder, takes no birth control or other hormonal treatments  Hypercoaguable work up on initial blood prior to anticoagulation  HemOnc consult  BLE doppler  Echo    #Hypertension  Resume home losartan  As needed IV hydralazine    #Prediabetes  A1c 6/2023 5.9%, update  Sliding scale insulin            Diet: NPO per Anesthesia Guidelines for Procedure/Surgery Except for: Meds    Daniel Catheter: Not present  Lines: None     Cardiac Monitoring: ACTIVE order. Indication: ICU  Code Status: Full Code      Clinically Significant Risk Factors Present on Admission                  # Hypertension: Noted on problem list      # Severe Obesity: Estimated body mass index is 42.57 kg/m  as calculated from the following:    Height as of this encounter: 1.727 m (5' 8\").    Weight as of this encounter: 127 kg (280 lb).              Disposition Plan      Expected Discharge Date: 03/22/2024                  Milad Clark DO  Hospitalist Service  [facility]  Securely message with [employer] (more info)  Text page via [employer] Paging/Directory     ______________________________________________________________________    Chief Complaint   LLE swelling    History is obtained from the patient, electronic health record, and emergency department physician    History of Present Illness   Patient is a " 48-year-old female with history of hypertension who presents to the emergency department with left calf pain and swelling for the last few days.  She had episodes of dizziness upon standing with heart palpitations, chest pressure and difficulty breathing this afternoon around 4:30 PM.  Found to have saddle pulmonary embolism with right heart strain.  Patient denies any prior history of blood clots and is not aware of any family history of blood clots.  She takes no birth control or other hormonal treatments.      Past Medical History    Past Medical History:   Diagnosis Date    Celiac disease     Hypertension        Past Surgical History   Past Surgical History:   Procedure Laterality Date    ABDOMEN SURGERY  1999    Gall bladder    CHOLECYSTECTOMY      ENT SURGERY         Prior to Admission Medications   Prior to Admission Medications   Prescriptions Last Dose Informant Patient Reported? Taking?   cetirizine (ZYRTEC) 5 MG tablet 3/20/2024 at 0800 (x1 dose) Self Yes Yes   Sig: Take 5-10 mg by mouth daily as needed for allergies   losartan (COZAAR) 25 MG tablet 3/20/2024 at 0800 Self No Yes   Sig: TAKE ONE TABLET BY MOUTH ONCE DAILY      Facility-Administered Medications: None           Physical Exam   Vital Signs: Temp: 97.3  F (36.3  C) Temp src: Rectal BP: (!) 189/84 Pulse: 119   Resp: 27 SpO2: 96 % O2 Device: None (Room air)    Weight: 280 lbs 0 oz    General Appearance:  No acute distress  Respiratory: Nonlabored breathing, clear to auscultation bilaterally  Cardiovascular: Regular rate and rhythm  Extremities: Trace bilateral lower extremity nonpitting edema, left worse than right  Neuro: Alert and oriented x 3, normal speech      Medical Decision Making             Data

## 2024-03-21 NOTE — PROGRESS NOTES
Cross cover    Radiologist Dr Frederick called and informed DVT in both legs.    Reviewed chart. Pt has PE and presumed DVT on Heparin gtt.    EXAM: US LOWER EXTREMITY VENOUS DUPLEX BILATERAL  LOCATION: [facility]  DATE: 3/21/2024     INDICATION: Saddle PE, LLE swelling.  COMPARISON: CT pulmonary angiogram 03/20/2024.  TECHNIQUE: Venous Duplex ultrasound of bilateral lower extremities with and without compression, augmentation and duplex. Color flow and spectral Doppler with waveform analysis performed.     FINDINGS: Exam includes the common femoral, femoral, popliteal veins as well as segmentally visualized deep calf veins and greater saphenous vein.      RIGHT: Occlusive DVT involving the right external iliac and the common femoral veins. No superficial thrombophlebitis. No popliteal cyst.     LEFT: Occlusive DVT involving the popliteal vein extending into the posterior tibial and peroneal veins to the mid/distal calf. No superficial thrombophlebitis. Elongated left popliteal cyst measures 12.2 x 3.1 x 0.7 cm.                                                                      IMPRESSION:  1.  On the right, occlusive DVT involving the external iliac and common femoral veins.     2.  On the left, occlusive DVT involving the popliteal vein extending into the posterior tibial and peroneal veins to the mid/distal calf.     3.  Elongated left popliteal cyst.     Plan: Continue Heparin gtt and defer to AM rounder for further management.

## 2024-03-21 NOTE — PROCEDURES
Vascular and Interventional Radiology Procedure Note    Procedure: RLE thrombectomy.    Attending: Usha Ahuja MD    Medications: See Epic medication log    Complications:  No immediate complications.    Brief Findings: Successful right CFV/EIV thrombectomy with removal of all thrombus.  Please refer to the dictated procedure note for specific details.    Plan: 2 hours bedrest post sheath removal, heparin continued throughout, transition to DOAC, OK to step down from ICU, purse string suture to be removed tomorrow      Usha Ahuja MD, [facility]  Vascular and Interventional Physician  Vascular Medicine  Internal Medicine  Pager: [phone]  Clinic: [phone]

## 2024-03-21 NOTE — CONSULTS
[facility] Hematology and Oncology Inpatient Consult Note    Patient: Breanna Fischer  MRN: [id number]  Date of Service: 3/21/2024      Reason for Visit    I was consulted by Dr. Clark regarding large saddle PE, unknown etiology    Assessment/Plan    #.  Acute saddle embolus s/p mechanical thrombectomy  #.  Acute occlusive DVT of the external iliac and common femoral vein on the right, popliteal vein extending into the posterior tibial and peroneal vein to the mid/distal calf on the left lower extremity, status post mechanical thrombectomy of the right lower extremity  #.  Overweight    I reviewed the pathophysiology of venous thrombosis including provoke versus unprovoked etiologies.  Her clinical history suggest unprovoked etiology.  Hereditary and acquired thrombophilia workup were in process.  So far cardiolipin antibody and beta-2 glycoprotein levels were normal.  Antithrombin III level was also normal.  Other tests are pending.  I also recommended to update cancer screening.  I also explained to her that there are good percentage of the patient that we are not able to identify the provoking event even though after thorough search.    She is currently on IV heparin.  I discussed that she will be transition to oral anticoagulant when able. Due to the extent of life-threatening venous thromboembolism, I would recommend extended anticoagulation therapy, meaning no stop date of anticoagulation as long as he has excess risk of bleeding.  I discussed about warfarin versus direct oral anticoagulants (preferred) and their pros and cons.      I discussed about long-term follow-up with ongoing use of anticoagulation therapy.  I will arrange follow-up with me at [facility].     ______________________________________________________________________________    History  Ms. Breanna Fischer is a very pleasant 48 year old female presented with left calf pain and swelling for the last few days along with episodes of  dizziness, are palpitation, or chest pressure and difficulty breathing.  She was found to have a saddle pulmonary emboli and elevated pulmonary pressure.   She emergently underwent pulmonary angiogram with thrombectomy (3/20/2024) and right CFV/EIV thrombectomy with removal of thrombus (3/21/2024).  She is currently on IV heparin.  She tolerates IV heparin well without intolerance of bleeding.  She reported that she feels some discomfort in her calf.  No sensation changes.    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  The nature of her job is very sedentary.  She described that she had to be in a meeting with the client from 9-12 without even a very restroom break sometime.      She does not smoke.  She does not take any hormone replacement therapy or birth control.  No injury.  She does not have personal history of DVT or PE.  No family history of DVT or PE.  Her sister has some type of lymphoma in a [place] age and she had cardiac complication related to prior chemotherapy and radiation exposure.  Her sister is on long-term anticoagulation because of that and she did not believe that it was related to VTE.    Review of systems.  Apart from describing in history, the remainder of comprehensive ROS was negative.      Past History  Past Medical History:   Diagnosis Date    Celiac disease     Hypertension      Past Surgical History:   Procedure Laterality Date    ABDOMEN SURGERY  1999    Gall bladder    CHOLECYSTECTOMY      ENT SURGERY      IR MECH VENOUS THROMB  3/21/2024    IR PULMONARY ANGIOGRAM BILATERAL  3/20/2024     Family History   Problem Relation Age of Onset    Obesity Mother     Thyroid Disease Mother     Diabetes Mother     Hypertension Mother     Thyroid Disease Father     Diabetes Father     Hypertension Father     Obesity Father     Breast Cancer Paternal Grandmother     Obesity Sister     Thyroid Disease Sister     Cerebrovascular Disease Sister     Other Cancer Sister   "   Asthma Sister     Melanoma No family hx of     Skin Cancer No family hx of      Social History     Socioeconomic History    Marital status:      Spouse name: None    Number of children: None    Years of education: None    Highest education level: None   Tobacco Use    Smoking status: Never    Smokeless tobacco: Never   Substance and Sexual Activity    Alcohol use: No    Drug use: No    Sexual activity: Not Currently     Partners: Male     Birth control/protection: Abstinence   Other Topics Concern    Parent/sibling w/ CABG, MI or angioplasty before 65F 55M? Yes     Comment: Radiation damaged my sister's heart, 2-3 heart attacks in 30       Allergies    Allergies   Allergen Reactions    Lisinopril Cough    Tea Tree Oil Rash          Physical Exam    /63   Pulse 83   Temp 98.1  F (36.7  C) (Oral)   Resp 14   Ht 1.727 m (5' 8\")   Wt 127 kg (280 lb)   SpO2 90%   BMI 42.57 kg/m        General: alert, awake, not in acute distress  HEENT: Head: Normal, normocephalic, atraumatic.  Eye: Normal external eye, conjunctiva, lids cornea, CARINA.  Nose: Normal external nose, mucus membranes and septum.  Pharynx: Normal buccal mucosa. Normal pharynx.  Neck / Thyroid: Supple, no masses, nodes, nodules or enlargement.  Lymphatics: No abnormally enlarged lymph nodes.  Chest: Normal chest wall and respirations. Clear to auscultation.  Heart: S1 S2 RRR, no murmur.   Abdomen: abdomen is soft without significant tenderness, masses, organomegaly or guarding  Extremities: normal strength, tone, and muscle mass  Skin: normal. no rash or abnormalities  CNS: non focal.      Lab Results  Recent Results (from the past 24 hour(s))   Glucose by meter    Collection Time: 03/20/24 11:32 PM   Result Value Ref Range    GLUCOSE BY METER POCT 199 (H) 70 - 99 mg/dL   US Lower Extremity Venous Duplex Bilateral    Collection Time: 03/21/24  1:04 AM   Result Value Ref Range    Radiologist flags (AA)      Right external iliac/common " femoral DVT, and left popliteal, posterior tibial and peroneal DVT.   CBC with platelets    Collection Time: 03/21/24  2:22 AM   Result Value Ref Range    WBC Count 9.0 4.0 - 11.0 10e3/uL    RBC Count 4.67 3.80 - 5.20 10e6/uL    Hemoglobin 14.2 11.7 - 15.7 g/dL    Hematocrit 42.0 35.0 - 47.0 %    MCV 90 78 - 100 fL    MCH 30.4 26.5 - 33.0 pg    MCHC 33.8 31.5 - 36.5 g/dL    RDW 12.2 10.0 - 15.0 %    Platelet Count 206 150 - 450 10e3/uL   Heparin Unfractionated Anti Xa Level    Collection Time: 03/21/24  2:22 AM   Result Value Ref Range    Anti Xa Unfractionated Heparin 0.50 For Reference Range, See Comment IU/mL   Antithrombin III    Collection Time: 03/21/24  2:22 AM   Result Value Ref Range    Antithrombin III 92 85 - 135 %   Beta 2 Glycoprotein 1 Antibody IgM    Collection Time: 03/21/24  2:22 AM   Result Value Ref Range    Beta 2 Glycoprotein 1 Antibody IgM 3.5 <7.0 U/mL   Cardiolipin Lacy IgG and IgM    Collection Time: 03/21/24  2:22 AM   Result Value Ref Range    Cardiolipin Lacy IgG Instrument Value <2.0 <10.0 GPL-U/mL    Cardiolipin Antibody IgG Negative Negative    Cardiolipin Lacy IgM Instrument Value 8.0 <10.0 MPL-U/mL    Cardiolipin Antibody IgM Negative Negative   Comprehensive metabolic panel    Collection Time: 03/21/24  6:34 AM   Result Value Ref Range    Sodium 139 135 - 145 mmol/L    Potassium 4.0 3.4 - 5.3 mmol/L    Carbon Dioxide (CO2) 25 22 - 29 mmol/L    Anion Gap 8 7 - 15 mmol/L    Urea Nitrogen 13.0 6.0 - 20.0 mg/dL    Creatinine 0.58 0.51 - 0.95 mg/dL    GFR Estimate >90 >60 mL/min/1.73m2    Calcium 8.6 8.6 - 10.0 mg/dL    Chloride 106 98 - 107 mmol/L    Glucose 130 (H) 70 - 99 mg/dL    Alkaline Phosphatase 114 40 - 150 U/L    AST 40 0 - 45 U/L    ALT 30 0 - 50 U/L    Protein Total 6.6 6.4 - 8.3 g/dL    Albumin 3.6 3.5 - 5.2 g/dL    Bilirubin Total 0.4 <=1.2 mg/dL   CBC with platelets and differential    Collection Time: 03/21/24  6:34 AM   Result Value Ref Range    WBC Count 7.0 4.0 - 11.0  10e3/uL    RBC Count 4.43 3.80 - 5.20 10e6/uL    Hemoglobin 13.4 11.7 - 15.7 g/dL    Hematocrit 40.2 35.0 - 47.0 %    MCV 91 78 - 100 fL    MCH 30.2 26.5 - 33.0 pg    MCHC 33.3 31.5 - 36.5 g/dL    RDW 12.0 10.0 - 15.0 %    Platelet Count 200 150 - 450 10e3/uL    % Neutrophils 60 %    % Lymphocytes 31 %    % Monocytes 6 %    % Eosinophils 2 %    % Basophils 1 %    % Immature Granulocytes 0 %    NRBCs per 100 WBC 0 <1 /100    Absolute Neutrophils 4.2 1.6 - 8.3 10e3/uL    Absolute Lymphocytes 2.2 0.8 - 5.3 10e3/uL    Absolute Monocytes 0.4 0.0 - 1.3 10e3/uL    Absolute Eosinophils 0.1 0.0 - 0.7 10e3/uL    Absolute Basophils 0.0 0.0 - 0.2 10e3/uL    Absolute Immature Granulocytes 0.0 <=0.4 10e3/uL    Absolute NRBCs 0.0 10e3/uL   Glucose by meter    Collection Time: 03/21/24  7:37 AM   Result Value Ref Range    GLUCOSE BY METER POCT 120 (H) 70 - 99 mg/dL   Heparin Unfractionated Anti Xa Level    Collection Time: 03/21/24 10:47 AM   Result Value Ref Range    Anti Xa Unfractionated Heparin 0.47 For Reference Range, See Comment IU/mL   Extra Red Top Tube (LAB USE ONLY)    Collection Time: 03/21/24 10:48 AM   Result Value Ref Range    Hold Specimen JIC    Echocardiogram Complete    Collection Time: 03/21/24 12:00 PM   Result Value Ref Range    LVEF  60-65%    Glucose by meter    Collection Time: 03/21/24 12:15 PM   Result Value Ref Range    GLUCOSE BY METER POCT 142 (H) 70 - 99 mg/dL   Activated clotting time celite, POCT    Collection Time: 03/21/24  3:42 PM   Result Value Ref Range    Activated Clotting Time (Celite) POCT 224 (H) 74 - 150 seconds   Glucose by meter    Collection Time: 03/21/24  4:51 PM   Result Value Ref Range    GLUCOSE BY METER POCT 119 (H) 70 - 99 mg/dL   Glucose by meter    Collection Time: 03/21/24  7:48 PM   Result Value Ref Range    GLUCOSE BY METER POCT 138 (H) 70 - 99 mg/dL        Imaging Results    US Lower Extremity Venous Duplex Bilateral    Result Date: 3/21/2024  EXAM: US LOWER EXTREMITY  VENOUS DUPLEX BILATERAL LOCATION: [facility] DATE: 3/21/2024 INDICATION: Saddle PE, LLE swelling. COMPARISON: CT pulmonary angiogram 03/20/2024. TECHNIQUE: Venous Duplex ultrasound of bilateral lower extremities with and without compression, augmentation and duplex. Color flow and spectral Doppler with waveform analysis performed. FINDINGS: Exam includes the common femoral, femoral, popliteal veins as well as segmentally visualized deep calf veins and greater saphenous vein. RIGHT: Occlusive DVT involving the right external iliac and the common femoral veins. No superficial thrombophlebitis. No popliteal cyst. LEFT: Occlusive DVT involving the popliteal vein extending into the posterior tibial and peroneal veins to the mid/distal calf. No superficial thrombophlebitis. Elongated left popliteal cyst measures 12.2 x 3.1 x 0.7 cm.     IMPRESSION: 1.  On the right, occlusive DVT involving the external iliac and common femoral veins. 2.  On the left, occlusive DVT involving the popliteal vein extending into the posterior tibial and peroneal veins to the mid/distal calf. 3.  Elongated left popliteal cyst. [Critical Result: Right external iliac/common femoral DVT, and left popliteal, posterior tibial and peroneal DVT. Finding was identified on 3/21/2024 1:07 AM CDT. Dr. Nunez was contacted by me on 3/21/2024 1:41 AM CDT and verbalized understanding of the critical result.     IR Pulmonary Angiogram Bilateral    Result Date: 3/20/2024  [place] [facility] LOCATION: [facility] DATE: 03/20/2024 PROCEDURE: 1.  SELECTIVE AND SUPERSELECTIVE BILATERAL PULMONARY ARTERIOGRAPHY. 2.  EXTIRPATION OF MATTER FROM THE BILATERAL PULMONARY ARTERIES, PERCUTANEOUS APPROACH. 3.  INTRA-ARTERIAL PRESSURE MEASUREMENTS. 4.  ULTRASOUND GUIDANCE FOR VASCULAR ACCESS. 5.  CLOSURE DEVICE. 6.  MODERATE SEDATION. INTERVENTIONAL RADIOLOGIST: Nathan Joseph M.D. INDICATION: 48-year-old female with bilateral pulmonary emboli  including saddle embolus as well as with CT evidence of possible right heart strain. Pulmonary embolectomy is requested. CONSENT: The risks, benefits and alternatives of the stated procedure were discussed with the patient in detail. All questions were answered. Informed consent was given to proceed with the procedure. MODERATE SEDATION: Versed 3.5 mg IV; Fentanyl 175 mcg IV. During the time out, immediately prior to the administration of medications, the patient was reassessed for adequacy to receive conscious sedation.  Under physician supervision, Versed and fentanyl were administered for moderate sedation. Pulse oximetry, heart rate and blood pressure were continuously monitored by an independent trained observer. The physician spent 53 minutes of face-to-face sedation time with the patient. CONTRAST: 75 mL Omni 350 ANTIBIOTICS: None. ADDITIONAL MEDICATIONS: 4000 units IV heparin FLUOROSCOPIC TIME: 8.6 minutes. RADIATION DOSE: Air Kerma: 205 mGy. COMPLICATIONS: No immediate complications. STERILE BARRIER TECHNIQUE: Maximum sterile barrier technique was used. Cutaneous antisepsis was performed at the operative site with application of 2% chlorhexidine and large sterile drape. Prior to the procedure, the  and assistant performed hand hygiene and wore hat, mask, sterile gown, and sterile gloves during the entire procedure. PROCEDURE:  The procedure, including the risks, benefits, and alternatives to the procedure itself were discussed with the patient. When all of their questions were answered informed written and verbal consent was obtained. The patient was then brought to the Interventional Radiology suite, placed in a supine position, and the patient's right groin was sterilely prepped and draped. The right common femoral vein was noted to be ultrasound patent. After giving local anesthesia with lidocaine, the right common femoral vein was punctured with a 21 gauge needle, under ultrasound guidance with  a permanent image stored. A 0.018 inch wire advanced through the needle into the external iliac artery under fluoroscopic guidance. The needle was then exchanged over the wire for a 4 [place] coaxial dilator. The inner 3 [place] dilator and 0.018 inch wire were then exchanged for a 0.035 inch guidewire. The outer 4 [place] dilator was then exchanged over the guidewire for a 6 [place] vascular sheath.  Utilizing preclose technique 2 Perclose suture devices were deployed at the right common femoral venous access site in standard fashion. The 6 [place] sheath was removed over a 0.035 inch Amplatz wire. Serial dilatation was performed to 20 [place] followed  by a 24 [place] dry seal sheath. A 6 [place] Grollman-type catheter was advanced over a 0.035 inch Bentson wire to the level of the right atrium followed by the right ventricle where pressure measurements were obtained. The catheter was then manipulated into the right pulmonary artery where pressure measurements were obtained. Digital subtraction right sided angiography was performed. A 0.035 inch 1 cm floppy tip Amplatz wire was manipulated into the right lower lobe segmental branch (greater than third order). A 24 [place] FlowTriever catheter was advanced into the main right pulmonary artery and primary percutaneous transluminal mechanical thrombectomy (extirpation of matter) was performed with removal of a large amount of thrombus from the main right and right lower and right upper lobe pulmonary arteries. Pressure measurement was obtained. The catheter was retracted in the main pulmonary artery. A 5 [place]ian KMP catheter, with the aid of a 0.035 inch angled Glidewire, was directed through the 24 [place] catheter and directed into the left pulmonary artery. Pressure measurements were obtained.  A pulmonary arteriogram was obtained. Next the Amplatz wire was removed from the left pulmonary artery and advanced through the KMP catheter into the left pulmonary artery. Following  this the KMP catheter was exchanged for the dilator of the 24 [place] catheter. This was advanced into the left pulmonary artery. The 24 [place] FlowTriever catheter was advanced into the main left pulmonary artery and primary percutaneous transluminal mechanical thrombectomy (extirpation of matter) was performed with removal of a minimal amount of thrombus from the left mid and left lower pulmonary arteries. Pressure measurements were obtained throughout as the catheter was removed. The catheters and sheaths were removed. Hemostasis was achieved via the previously placed Perclose suture devices. FINDINGS: Ultrasound demonstrates a patent and fully compressible right common femoral vein. A permanent image was stored. The digital subtraction pulmonary arteriography shows thrombus within the bilateral pulmonary arteries.     IMPRESSION:  1.  Primary percutaneous transluminal mechanical thrombectomy (extirpation of matter) from the bilateral pulmonary arterial systems as detailed above. PULMONARY ARTERIAL PRESSURES (mmHg): Right atrium prior to thrombectomy: 5 right atrium following thrombectomy: 4 Right ventricle prior to thrombectomy: 23 right ventricle following thrombectomy: 13 Main pulmonary artery prior to thrombectomy: 28 Main pulmonary artery following thrombectomy: 18 Right pulmonary artery prior to thrombectomy: 31 Right pulmonary artery following thrombectomy: 16 Left pulmonary artery prior to thrombectomy: 26 Left pulmonary artery following thrombectomy: 10     CT Chest Pulmonary Embolism w Contrast    Result Date: 3/20/2024  EXAM: CT CHEST PULMONARY EMBOLISM W CONTRAST LOCATION: [facility] DATE: 3/20/2024 INDICATION: leg pain, shortness of breath, tachycardia and chest pressure COMPARISON: None. TECHNIQUE: CT chest pulmonary angiogram during arterial phase injection of IV contrast. Multiplanar reformats and MIP reconstructions were performed. Dose reduction techniques were used.  CONTRAST: isovue 370 90ml FINDINGS: ANGIOGRAM CHEST: Large saddle pulmonary embolus is present. Multiple pulmonary emboli are present bilaterally, right greater than left, extending from the lobar branches into the segmental branches. Pulmonary arteries appear mildly dilated. Thoracic aorta normal in caliber. No aortic dissection. HEART: Right atrium and ventricle are mildly dilated. The interatrial septum is bowed to the left. No pericardial effusion. No coronary artery calcification. MEDIASTINUM: No adenopathy or mass. LUNGS AND PLEURA: No pulmonary mass, consolidation, or suspicious pulmonary nodule. No pleural effusion or pneumothorax. LIMITED UPPER ABDOMEN: Prior cholecystectomy. MUSCULOSKELETAL: Negative.     IMPRESSION: 1.  Multiple pulmonary emboli bilaterally including large saddle pulmonary embolus. Elevated right heart pressure. [Critical Result: New diagnosis of pulmonary embolism] Finding was identified on 3/20/2024 7:40 PM CDT. Physician assistant Linsdey Reddy was contacted by me on 3/20/2024 7:45 PM CDT and verbalized understanding of the critical result.       Signed by: Arianna Zhu MD

## 2024-03-22 VITALS
TEMPERATURE: 98 F | OXYGEN SATURATION: 96 % | SYSTOLIC BLOOD PRESSURE: 128 MMHG | HEIGHT: 68 IN | WEIGHT: 280 LBS | HEART RATE: 66 BPM | DIASTOLIC BLOOD PRESSURE: 65 MMHG | BODY MASS INDEX: 42.44 KG/M2 | RESPIRATION RATE: 16 BRPM

## 2024-03-22 LAB
ANION GAP SERPL CALCULATED.3IONS-SCNC: 9 MMOL/L (ref 7–15)
BUN SERPL-MCNC: 17.4 MG/DL (ref 6–20)
CALCIUM SERPL-MCNC: 8.7 MG/DL (ref 8.6–10)
CANCER AG125 SERPL-ACNC: 3 U/ML
CANCER AG19-9 SERPL IA-ACNC: 3 U/ML
CEA SERPL-MCNC: <0.6 NG/ML
CHLORIDE SERPL-SCNC: 105 MMOL/L (ref 98–107)
CREAT SERPL-MCNC: 0.65 MG/DL (ref 0.51–0.95)
DEPRECATED HCO3 PLAS-SCNC: 24 MMOL/L (ref 22–29)
DRVVT SCREEN RATIO: 1.03
EGFRCR SERPLBLD CKD-EPI 2021: >90 ML/MIN/1.73M2
ERYTHROCYTE [DISTWIDTH] IN BLOOD BY AUTOMATED COUNT: 12.1 % (ref 10–15)
GLUCOSE BLDC GLUCOMTR-MCNC: 122 MG/DL (ref 70–99)
GLUCOSE BLDC GLUCOMTR-MCNC: 130 MG/DL (ref 70–99)
GLUCOSE SERPL-MCNC: 124 MG/DL (ref 70–99)
HCT VFR BLD AUTO: 38.7 % (ref 35–47)
HEPZYMED PTT RATIO: 0.97
HEPZYMED PTT-LA: 36 SECONDS (ref 31–45)
HEPZYMED THROMBIN TIME: 18.6 SECONDS (ref 15.7–21.7)
HGB BLD-MCNC: 12.7 G/DL (ref 11.7–15.7)
INR PPP: 1.06 (ref 0.85–1.15)
LA PPP-IMP: NEGATIVE
LUPUS INTERPRETATION: ABNORMAL
MCH RBC QN AUTO: 29.9 PG (ref 26.5–33)
MCHC RBC AUTO-ENTMCNC: 32.8 G/DL (ref 31.5–36.5)
MCV RBC AUTO: 91 FL (ref 78–100)
PATIENT PTT-LA: 81 SECONDS (ref 31–45)
PLATELET # BLD AUTO: 197 10E3/UL (ref 150–450)
POTASSIUM SERPL-SCNC: 4 MMOL/L (ref 3.4–5.3)
RBC # BLD AUTO: 4.25 10E6/UL (ref 3.8–5.2)
SODIUM SERPL-SCNC: 138 MMOL/L (ref 135–145)
THROMBIN TIME: >60 SECONDS (ref 13–19)
UFH PPP CHRO-ACNC: 0.63 IU/ML
WBC # BLD AUTO: 5.4 10E3/UL (ref 4–11)

## 2024-03-22 PROCEDURE — 99239 HOSP IP/OBS DSCHRG MGMT >30: CPT | Performed by: INTERNAL MEDICINE

## 2024-03-22 PROCEDURE — 250N000011 HC RX IP 250 OP 636: Performed by: INTERNAL MEDICINE

## 2024-03-22 PROCEDURE — 36415 COLL VENOUS BLD VENIPUNCTURE: CPT | Performed by: INTERNAL MEDICINE

## 2024-03-22 PROCEDURE — 80048 BASIC METABOLIC PNL TOTAL CA: CPT | Performed by: INTERNAL MEDICINE

## 2024-03-22 PROCEDURE — 250N000013 HC RX MED GY IP 250 OP 250 PS 637: Performed by: HOSPITALIST

## 2024-03-22 PROCEDURE — 85520 HEPARIN ASSAY: CPT | Performed by: INTERNAL MEDICINE

## 2024-03-22 PROCEDURE — 250N000013 HC RX MED GY IP 250 OP 250 PS 637: Performed by: INTERNAL MEDICINE

## 2024-03-22 PROCEDURE — 85027 COMPLETE CBC AUTOMATED: CPT | Performed by: INTERNAL MEDICINE

## 2024-03-22 RX ORDER — APIXABAN 5 MG (74)
KIT ORAL
Qty: 1 EACH | Refills: 0 | Status: SHIPPED | OUTPATIENT
Start: 2024-03-22 | End: 2024-04-21

## 2024-03-22 RX ORDER — IBUPROFEN 400 MG/1
400 TABLET, FILM COATED ORAL EVERY 6 HOURS PRN
COMMUNITY
Start: 2024-03-22 | End: 2024-04-06

## 2024-03-22 RX ORDER — ACETAMINOPHEN 325 MG/1
650 TABLET ORAL EVERY 4 HOURS PRN
COMMUNITY
Start: 2024-03-22 | End: 2024-04-06

## 2024-03-22 RX ORDER — IBUPROFEN 400 MG/1
400 TABLET, FILM COATED ORAL EVERY 6 HOURS PRN
Status: DISCONTINUED | OUTPATIENT
Start: 2024-03-22 | End: 2024-03-22 | Stop reason: HOSPADM

## 2024-03-22 RX ADMIN — LOSARTAN POTASSIUM 25 MG: 25 TABLET, FILM COATED ORAL at 08:13

## 2024-03-22 RX ADMIN — APIXABAN 10 MG: 5 TABLET, FILM COATED ORAL at 11:40

## 2024-03-22 RX ADMIN — KETOROLAC TROMETHAMINE 30 MG: 30 INJECTION, SOLUTION INTRAMUSCULAR; INTRAVENOUS at 00:32

## 2024-03-22 ASSESSMENT — ACTIVITIES OF DAILY LIVING (ADL)
ADLS_ACUITY_SCORE: 24
ADLS_ACUITY_SCORE: 23
ADLS_ACUITY_SCORE: 24
ADLS_ACUITY_SCORE: 24
ADLS_ACUITY_SCORE: 23

## 2024-03-22 NOTE — DISCHARGE SUMMARY
"[facility]  Hospitalist Discharge Summary      Date of Admission:  3/20/2024  Date of Discharge:  3/22/2024  Discharging Provider: Rachel Read MD  Discharge Service: Hospitalist Service  Discharge Diagnoses   Acute saddle pulmonary embolism  Acute RV strain due to massive pulmonary embolism  Right occlusive DVT involving right external iliac and common femoral veins   S/p pulmonary thrombectomy  S/p right femoral thrombectomy  Essential hypertension  Prediabetes  Morbid obesity  History of positive Cologuard in January 2024    Clinically Significant Risk Factors   # Severe Obesity: Estimated body mass index is 42.57 kg/m  as calculated from the following:    Height as of this encounter: 1.727 m (5' 8\").    Weight as of this encounter: 127 kg (280 lb).     Follow-ups Needed After Discharge   Follow-up Appointments    Follow-up and recommended labs and tests      Follow up with primary care provider, Luciana Franco, within 7 days to   evaluate medication change and for hospital follow- up.  The following   labs/tests are recommended: CBC, BMP, INR.      Unresulted Labs Ordered in the Past 30 Days of this Admission       Date and Time Order Name Status Description    3/21/2024  9:17 AM Cancer antigen 19-9 In process     3/20/2024  9:01 PM Protein S Antigen Free In process     3/20/2024  9:01 PM Protein C chromogenic In process         These results will be followed up by me    Discharge Disposition   Discharged to home  Condition at discharge: Stable    Hospital Course   Patient is a 48-year-old female with history of hypertension who presented with left calf pain, chest pressure and shortness of breath and found to have saddle PE with right heart strain.       #Large saddle PE.  S/p pulmonary angiogram with thrombectomy 3/20/2024.  S/p pulmonary thrombectomy.  On heparin drip.  Transition to Eliquis.    #Multiple bilateral pulmonary emboli.  #Right heart strain due to massive pulmonary " embolism.  # Right occlusive DVT involving right s/p thrombectomy.     No superficial thrombophlebitis.  S/p RLE mechanical thrombectomy 3/21/2024.  Echo 3/21 showed LVEF 60-65% concentric LVH hypertrophy, no WMA, flattened septum consistent with RV pressure/volume overload.  RV mildly dilated, with normal systolic function.  Unclear etiology, no prior history of blood clots and no family history of clotting disorder, takes no birth control or other hormonal treatments.  Denies smoking.  Admits to sedentary lifestyle due to occupation, not wearing compression stockings.  Hypercoaguable work up on initial blood prior to anticoagulation, results pending.  She was seen by hematology oncology, recommended 3 months follow-up in the clinic.     #History of positive Cologuard in January 2024.  She has colonoscopy scheduled for June.  Advised to reschedule colonoscopy earlier than June.  No personal family history of colon cancer.  CEA normal.     # Essential hypertension  Resume home losartan  As needed IV hydralazine     #Prediabetes  A1c 6/2023 5.9%, update  Sliding scale insulin     # Morbid obesity due to excessive calories.Body mass index is 42.57 kg/m .  Patient would benefit from lifestyle modifications to obtain healthy weight.  Consider RD consult for education on calorie reduced diet.    Consultations This Hospital Stay   PHARMACY IP CONSULT  HEMATOLOGY & ONCOLOGY IP CONSULT  PHARMACY LIAISON FOR MEDICATION COVERAGE CONSULT    Code Status   Full Code    Time Spent on this Encounter   I, Rachel Read MD, personally saw the patient today and spent greater than 30 minutes discharging this patient.       Rachel Read MD  [address] [address]  Phone: [phone]  Fax: [phone]  ______________________________________________________________________    Physical Exam   Vital Signs: Temp: 98  F (36.7  C) Temp src: Oral BP: 126/60 Pulse: 70    Resp: 15 SpO2: 93 % O2 Device: None (Room air)    Weight: 280 lbs 0 oz  General: Alert and oriented x 3. Not in obvious distress.  HEENT: NC, AT. Neck- supple, No JVP elevation, lymphadenopathy or thyromegaly. Trachea-central.  Chest: Clear to auscultation bilaterally.  Heart: S1S2 regular. No M/R/G.  Abdomen: Soft. NT, ND. No organomegaly. Bowel sounds- active.  Back: No spine tenderness. No CVA tenderness.  Extremities: No leg swelling. Peripheral pulses 2+ bilaterally.  Neuro: Cranial nerves 1-12 grossly normal. No focal neurological deficit       Primary Care Physician   Luciana Franco    Discharge Orders      Brief Discharge Instructions    Mechanical Thrombectomy Procedure:  This procedure is used to help when people have a bloodclot(s) blocking one or more of the large blood vessels within the body. It involves inserting a catheter (thin flexible tube) with a device into the blood stream so that clot within blood vessel can be removed. Thistreatment can reduce the long-term effects of clot burden. Please follow the below instructions as you recover:    Care instructions after angiogram procedure:  -  If you received sedation for yourprocedure, do not drive or operate heavy machinery for the rest of the day.  -  Do not lift objects greater than 10 pounds for 3 days following the procedure.  -  Avoid excessive exercise and straining for 3 days.    Avoid tub baths, pools, hot tubs and Jacuzzis for 3 days or until procedure site is well healed.   -  You may shower beginning tomorrow. Do not scrub procedure site until well healed; pat dry.  -  Return to yournormal activities as you tolerate after the 3 day restriction.  -  You can expect to return to work 1-3 days after your procedure - depending on the nature of your profession.  -  It is normal to have sometenderness and minimal swelling at procedure puncture site. A small area of discoloration may be present. Tenderness typically subsides in 1-2 days. A small  knot may also be present at puncture site for 6-8 weeks. This canbe a normal part of the healing process.     Please seek medical evaluation for:  - If you develop fevers (greater than 101 F (38.3C)).  - If you develop increasing pain, redness, purulent drainage,tenderness, or swelling at procedure site.   - If you experience any bleeding from procedure/puncture site: lie down, firmly apply pressure to puncture site and call 911.  - Seek emergent evaluation if you experienceany new leg/arm pain, discoloration or numbness.  - Increasing shortness of breath.  - Increasing swelling or pain within your arms/legs.     Reason for your hospital stay    Saddle pulmonary embolism, occlusive right femoral vein DVT     Follow-up and recommended labs and tests     Follow up with primary care provider, Luciana Franco, within 7 days to evaluate medication change and for hospital follow- up.  The following labs/tests are recommended: CBC, BMP, INR.     Activity    Your activity upon discharge: activity as tolerated     Diet    Follow this diet upon discharge: Orders Placed This Encounter      Combination Diet Regular Diet; Moderate Consistent Carb (60 g CHO per Meal) Diet, calorie reduced 1800 Alfonso/day     Significant Results and Procedures   Results for orders placed or performed during the hospital encounter of 03/20/24   CT Chest Pulmonary Embolism w Contrast     Value    Radiologist flags New diagnosis of pulmonary embolism (AA)    Narrative    EXAM: CT CHEST PULMONARY EMBOLISM W CONTRAST  LOCATION: [facility]  DATE: 3/20/2024    INDICATION: leg pain, shortness of breath, tachycardia and chest pressure  COMPARISON: None.  TECHNIQUE: CT chest pulmonary angiogram during arterial phase injection of IV contrast. Multiplanar reformats and MIP reconstructions were performed. Dose reduction techniques were used.   CONTRAST: isovue 370 90ml    FINDINGS:  ANGIOGRAM CHEST: Large saddle pulmonary embolus is  present. Multiple pulmonary emboli are present bilaterally, right greater than left, extending from the lobar branches into the segmental branches. Pulmonary arteries appear mildly dilated. Thoracic   aorta normal in caliber. No aortic dissection.    HEART: Right atrium and ventricle are mildly dilated. The interatrial septum is bowed to the left. No pericardial effusion. No coronary artery calcification.    MEDIASTINUM: No adenopathy or mass.    LUNGS AND PLEURA: No pulmonary mass, consolidation, or suspicious pulmonary nodule. No pleural effusion or pneumothorax.    LIMITED UPPER ABDOMEN: Prior cholecystectomy.    MUSCULOSKELETAL: Negative.      Impression    IMPRESSION:  1.  Multiple pulmonary emboli bilaterally including large saddle pulmonary embolus. Elevated right heart pressure.      [Critical Result: New diagnosis of pulmonary embolism]    Finding was identified on 3/20/2024 7:40 PM CDT.     Physician assistant Lindsey Reddy was contacted by me on 3/20/2024 7:45 PM CDT and verbalized understanding of the critical result.   IR Pulmonary Angiogram Bilateral    Narrative    [place] [facility]  LOCATION: [facility]  DATE: 03/20/2024    PROCEDURE:   1.  SELECTIVE AND SUPERSELECTIVE BILATERAL PULMONARY ARTERIOGRAPHY.  2.  EXTIRPATION OF MATTER FROM THE BILATERAL PULMONARY ARTERIES, PERCUTANEOUS APPROACH.  3.  INTRA-ARTERIAL PRESSURE MEASUREMENTS.  4.  ULTRASOUND GUIDANCE FOR VASCULAR ACCESS.  5.  CLOSURE DEVICE.  6.  MODERATE SEDATION.    INTERVENTIONAL RADIOLOGIST: Nathan Joseph M.D.    INDICATION: 48-year-old female with bilateral pulmonary emboli including saddle embolus as well as with CT evidence of possible right heart strain. Pulmonary embolectomy is requested.    CONSENT: The risks, benefits and alternatives of the stated procedure were discussed with the patient in detail. All questions were answered. Informed consent was given to proceed with the procedure.    MODERATE SEDATION: Versed 3.5  mg IV; Fentanyl 175 mcg IV. During the time out, immediately prior to the administration of medications, the patient was reassessed for adequacy to receive conscious sedation.  Under physician supervision, Versed and   fentanyl were administered for moderate sedation. Pulse oximetry, heart rate and blood pressure were continuously monitored by an independent trained observer. The physician spent 53 minutes of face-to-face sedation time with the patient.    CONTRAST: 75 mL Omni 350  ANTIBIOTICS: None.  ADDITIONAL MEDICATIONS: 4000 units IV heparin    FLUOROSCOPIC TIME: 8.6 minutes.  RADIATION DOSE: Air Kerma: 205 mGy.    COMPLICATIONS: No immediate complications.    STERILE BARRIER TECHNIQUE: Maximum sterile barrier technique was used. Cutaneous antisepsis was performed at the operative site with application of 2% chlorhexidine and large sterile drape. Prior to the procedure, the  and assistant performed   hand hygiene and wore hat, mask, sterile gown, and sterile gloves during the entire procedure.    PROCEDURE:    The procedure, including the risks, benefits, and alternatives to the procedure itself were discussed with the patient. When all of their questions were answered informed written and verbal consent was obtained. The patient was then brought to the   Interventional Radiology suite, placed in a supine position, and the patient's right groin was sterilely prepped and draped. The right common femoral vein was noted to be ultrasound patent. After giving local anesthesia with lidocaine, the right common   femoral vein was punctured with a 21 gauge needle, under ultrasound guidance with a permanent image stored. A 0.018 inch wire advanced through the needle into the external iliac artery under fluoroscopic guidance. The needle was then exchanged over the   wire for a 4 Prydeinig coaxial dilator. The inner 3 Prydeinig dilator and 0.018 inch wire were then exchanged for a 0.035 inch guidewire. The outer 4 Prydeinig  dilator was then exchanged over the guidewire for a 6 Bruneian vascular sheath.      Utilizing preclose technique 2 Perclose suture devices were deployed at the right common femoral venous access site in standard fashion. The 6 Bruneian sheath was removed over a 0.035 inch Amplatz wire. Serial dilatation was performed to 20 Bruneian followed   by a 24 Bruneian dry seal sheath.    A 6 Bruneian Grollman-type catheter was advanced over a 0.035 inch Bentson wire to the level of the right atrium followed by the right ventricle where pressure measurements were obtained. The catheter was then manipulated into the right pulmonary artery   where pressure measurements were obtained. Digital subtraction right sided angiography was performed.    A 0.035 inch 1 cm floppy tip Amplatz wire was manipulated into the right lower lobe segmental branch (greater than third order). A 24 Bruneian FlowTriever catheter was advanced into the main right pulmonary artery and primary percutaneous transluminal   mechanical thrombectomy (extirpation of matter) was performed with removal of a large amount of thrombus from the main right and right lower and right upper lobe pulmonary arteries. Pressure measurement was obtained.     The catheter was retracted in the main pulmonary artery. A 5 Bruneian KMP catheter, with the aid of a 0.035 inch angled Glidewire, was directed through the 24 Bruneian catheter and directed into the left pulmonary artery. Pressure measurements were obtained.   A pulmonary arteriogram was obtained. Next the Amplatz wire was removed from the left pulmonary artery and advanced through the KMP catheter into the left pulmonary artery. Following this the KMP catheter was exchanged for the dilator of the 24 Bruneian   catheter. This was advanced into the left pulmonary artery. The 24 Bruneian FlowTriever catheter was advanced into the main left pulmonary artery and primary percutaneous transluminal mechanical thrombectomy (extirpation of matter)  was performed with   removal of a minimal amount of thrombus from the left mid and left lower pulmonary arteries.     Pressure measurements were obtained throughout as the catheter was removed.    The catheters and sheaths were removed. Hemostasis was achieved via the previously placed Perclose suture devices.    FINDINGS:  Ultrasound demonstrates a patent and fully compressible right common femoral vein. A permanent image was stored.    The digital subtraction pulmonary arteriography shows thrombus within the bilateral pulmonary arteries.         Impression    IMPRESSION:      1.  Primary percutaneous transluminal mechanical thrombectomy (extirpation of matter) from the bilateral pulmonary arterial systems as detailed above.    PULMONARY ARTERIAL PRESSURES (mmHg):  Right atrium prior to thrombectomy: 5  right atrium following thrombectomy: 4    Right ventricle prior to thrombectomy: 23  right ventricle following thrombectomy: 13    Main pulmonary artery prior to thrombectomy: 28  Main pulmonary artery following thrombectomy: 18    Right pulmonary artery prior to thrombectomy: 31  Right pulmonary artery following thrombectomy: 16    Left pulmonary artery prior to thrombectomy: 26  Left pulmonary artery following thrombectomy: 10                 US Lower Extremity Venous Duplex Bilateral     Value    Radiologist flags (AA)     Right external iliac/common femoral DVT, and left popliteal, posterior tibial and peroneal DVT.    Narrative    EXAM: US LOWER EXTREMITY VENOUS DUPLEX BILATERAL  LOCATION: [facility]  DATE: 3/21/2024    INDICATION: Saddle PE, LLE swelling.  COMPARISON: CT pulmonary angiogram 03/20/2024.  TECHNIQUE: Venous Duplex ultrasound of bilateral lower extremities with and without compression, augmentation and duplex. Color flow and spectral Doppler with waveform analysis performed.    FINDINGS: Exam includes the common femoral, femoral, popliteal veins as well as segmentally  visualized deep calf veins and greater saphenous vein.     RIGHT: Occlusive DVT involving the right external iliac and the common femoral veins. No superficial thrombophlebitis. No popliteal cyst.    LEFT: Occlusive DVT involving the popliteal vein extending into the posterior tibial and peroneal veins to the mid/distal calf. No superficial thrombophlebitis. Elongated left popliteal cyst measures 12.2 x 3.1 x 0.7 cm.      Impression    IMPRESSION:  1.  On the right, occlusive DVT involving the external iliac and common femoral veins.    2.  On the left, occlusive DVT involving the popliteal vein extending into the posterior tibial and peroneal veins to the mid/distal calf.    3.  Elongated left popliteal cyst.      [Critical Result: Right external iliac/common femoral DVT, and left popliteal, posterior tibial and peroneal DVT.    Finding was identified on 3/21/2024 1:07 AM CDT.     Dr. Nunez was contacted by me on 3/21/2024 1:41 AM CDT and verbalized understanding of the critical result.    [facility] Venous Thromb    Narrative    LOCATION: [facility]  DATE: 3/21/2024    PROCEDURE: RIGHT LOWER EXTREMITY VENOGRAPHY, INFERIOR VENACAVOGRAM, ULTRASOUND GUIDANCE FOR VASCULAR ACCESS, BALLOON ANGIOPLASTY OF THE RIGHT EXTERNAL ILIAC AND COMMON ILIAC VEINS, MECHANICAL THROMBECTOMY/EXTIRPATION OF MATTER OF THE RIGHT COMMON FEMORAL   AND EXTERNAL ILIAC VEINS    INTERVENTIONAL RADIOLOGIST: Usha Auhja MD    INDICATION: 48-year-old female with right lower extremity iliofemoral deep vein thrombosis, plan for venography with thrombectomy.    CONSENT: The risks, benefits and alternatives of the procedure were discussed with the patient  in detail. All questions were answered. Informed consent was given to proceed with the procedure.    MODERATE SEDATION: Versed 3 mg IV; Fentanyl 100 mcg IV. During the time out, immediately prior to the administration of medications, the patient was reassessed for adequacy to  receive conscious sedation.  Under physician supervision, Versed and fentanyl   were administered for moderate sedation. Pulse oximetry, heart rate and blood pressure were continuously monitored by an independent trained observer. The physician spent 30 minutes of face-to-face sedation time with the patient.    CONTRAST: 55 cc Omnipaque    FLUOROSCOPIC TIME: 5.7 minutes.  RADIATION DOSE: Air Kerma: 1079 mGy.    COMPLICATIONS: No immediate complications.    UNIVERSAL PROTOCOL: The operative site was marked and any prior imaging was reviewed. Required items including blood products, implants, devices and special equipment was made available. Patient identity was confirmed either verbally, with demographic   information, hospital assigned identification or other identification markers. A timeout was performed immediately prior to the procedure.    STERILE BARRIER TECHNIQUE: Maximum sterile barrier technique was used. Cutaneous antisepsis was performed at the operative site with application of 2% chlorhexidine and large sterile drape. Prior to the procedure, the  and assistant performed   hand hygiene and wore hat, mask, sterile gown, and sterile gloves during the entire procedure.    PROCEDURE:    RIGHT: The right popliteal vein was punctured under real-time ultrasound guidance. A micropuncture access set was advanced into the vessel. An Amplatz wire was advanced into the vessel and track dilatation was performed. Over the wire exchange was made   for a 13F Inari ClotTreiver sheath. A right lower extremity venogram was performed.    Over the wire exchange was made for a ClotTreiver BOLD basket and mechanical thrombectomy of the right common femoral and external iliac veins was performed with removal of a moderate volume of acute appearing thrombus. Post thrombectomy venography   demonstrates persistent vessel narrowing of the right common iliac and external iliac veins. Over-the-wire exchange is made for a 16  mm x 4 cm angioplasty balloon and angioplasty of the right common iliac and external iliac veins was performed. Final   imaging demonstrates brisk in line flow through the popliteal vein, into the femoral/common femoral veins and iliac venous system. There is no significant residual stenosis or thrombosis.    At this point, the procedure was considered complete. The sheath was removed and hemostasis was achieved with the assistance of a pursestring suture at the venotomy site.    FINDINGS:  Ultrasound shows an anechoic and compressible right popliteal vein. A permanent image was stored to the patient's medical record.    Right lower extremity venography demonstrates patent above-knee popliteal vein and femoral vein. There is near occlusive thrombus within the right common femoral vein with extension into the right external iliac vein. The right common iliac vein appears   diminutive but patent. Inferior vena cava is patent.      Impression    IMPRESSION:    1. Successful mechanical thrombectomy of the right common femoral and external iliac veins with removal of a moderate volume of acute appearing thrombus.  2. Post procedure venography demonstrates brisk in line flow through the right lower extremity into the iliac system and inferior vena cava.    PLAN:  Plan to remove the pursestring suture on 3/22/2024. Resume full dose anticoagulation and transition to a direct oral anticoagulant.   Echocardiogram Complete     Value    LVEF  60-65%    Narrative    [id number]  [id number]  [id number]  [id number]^HOANG^[place] [address]     Name: KIRK MARQUES  MRN: [id number]  : 1975  Study Date: 2024 11:09 AM  Age: 48 yrs  Gender: Female  Patient Location: [facility]  Reason For Study: Pulmonary Embolism  Ordering Physician: HOANG ALEXIS  Performed By: WENDIE     BSA: 2.4 m2  Height: 68 in  Weight: 280 lb  HR: 82  BP: 137/67  mmHg  ______________________________________________________________________________  Procedure  Complete Portable Echo Adult. Definity (NDC #98251-585) given intravenously.  Adequate quality two-dimensional was performed and interpreted.  ______________________________________________________________________________  Interpretation Summary     The left ventricle is normal in size. There is mild concentric left  ventricular hypertrophy.  Left ventricular systolic function is normal. The visual ejection fraction is  60-65%. No regional wall motion abnormalities noted.  Flattened septum is consistent with RV pressure/volume overload.     The right ventricle is mildly dilated. The right ventricular systolic function  is normal.  Normal left atrial size. Right atrial size is normal.  IVC diameter <2.1 cm collapsing >50% with sniff suggests a normal RA pressure  of 3 mmHg.     Compared to previous study from 8/21/2022 the RV appears more dilated, there  is septal flattening consistent with RV pressure/volume overload.  ______________________________________________________________________________  Left Ventricle  The left ventricle is normal in size. There is mild concentric left  ventricular hypertrophy. Left ventricular systolic function is normal. The  visual ejection fraction is 60-65%. Left ventricular diastolic function is  indeterminate. No regional wall motion abnormalities noted. Flattened septum  is consistent with RV pressure/volume overload.     Right Ventricle  The right ventricle is mildly dilated. The right ventricular systolic function  is normal.     Atria  Normal left atrial size. Right atrial size is normal.     Mitral Valve  Mitral valve leaflets appear normal. There is mild mitral annular  calcification. There is trace mitral regurgitation. There is no mitral valve  stenosis.     Tricuspid Valve  Tricuspid valve leaflets appear normal. There is trace tricuspid  regurgitation. Right ventricular  systolic pressure could not be approximated  due to inadequate tricuspid regurgitation.     Aortic Valve  The aortic valve is trileaflet. Aortic valve leaflets appear normal. No aortic  regurgitation is present. No aortic stenosis is present.     Pulmonic Valve  The pulmonic valve is not well visualized. There is trace pulmonic valvular  regurgitation.     Vessels  The aorta root is normal. IVC diameter <2.1 cm collapsing >50% with sniff  suggests a normal RA pressure of 3 mmHg.     Pericardium  There is no pericardial effusion.     Rhythm  Sinus rhythm was noted.  ______________________________________________________________________________  MMode/2D Measurements & Calculations     IVSd: 1.2 cm  LVIDd: 4.0 cm  LVIDs: 2.7 cm  LVPWd: 1.2 cm  FS: 33.2 %  LV mass(C)d: 168.1 grams  LV mass(C)dI: 71.2 grams/m2  Ao root diam: 4.1 cm  asc Aorta Diam: 3.4 cm  LVOT diam: 2.4 cm  LVOT area: 4.5 cm2  Ao root diam index Ht(cm/m): 2.4  Ao root diam index BSA (cm/m2): 1.7  Asc Ao diam index BSA (cm/m2): 1.4  Asc Ao diam index Ht(cm/m): 2.0  LA Volume (BP): 62.1 ml     LA Volume Index (BP): 26.3 ml/m2  LA Volume Indexed (AL/bp): 27.4 ml/m2  RWT: 0.59     Time Measurements  MM HR: 91.0 BPM     Doppler Measurements & Calculations  MV E max neal: 40.4 cm/sec  MV A max neal: 65.0 cm/sec  MV E/A: 0.62  MV dec slope: 173.0 cm/sec2  MV dec time: 0.23 sec  Ao V2 max: 122.0 cm/sec  Ao max P.0 mmHg  Ao V2 mean: 76.0 cm/sec  Ao mean PG: 3.0 mmHg  Ao V2 VTI: 19.2 cm  TITI(I,D): 3.7 cm2  TITI(V,D): 3.3 cm2  LV V1 max PG: 3.2 mmHg  LV V1 max: 89.0 cm/sec  LV V1 VTI: 15.5 cm  SV(LVOT): 70.1 ml  SI(LVOT): 29.7 ml/m2  PA acc time: 0.06 sec  Pulm Sys Neal: 47.0 cm/sec  Pulm Tellez Neal: 37.1 cm/sec  Pulm A Revs Neal: 25.0 cm/sec  Pulm S/D: 1.3  AV Neal Ratio (DI): 0.73  TITI Index (cm2/m2): 1.5  E/E': 4.3  E/E' av.0  Lateral E/e': 3.7  Medial E/e': 4.3  Peak E' Neal: 9.5 cm/sec  RV S Neal: 10.6 cm/sec      ______________________________________________________________________________  Report approved by: Bethany Medina 03/21/2024 12:34 PM             Discharge Medications   Current Discharge Medication List        START taking these medications    Details   acetaminophen (TYLENOL) 325 MG tablet Take 2 tablets (650 mg) by mouth every 4 hours as needed for mild pain or other (and adjunct with moderate or severe pain or per patient request)    Associated Diagnoses: Acute deep vein thrombosis (DVT) of femoral vein of right lower extremity (H)      apixaban ANTICOAGULANT (ELIQUIS) 5 MG tablet Take 1 tablet (5 mg) by mouth 2 times daily for 90 days  Qty: 60 tablet, Refills: 0    Associated Diagnoses: Acute saddle pulmonary embolism with acute cor pulmonale (H); Acute deep vein thrombosis (DVT) of femoral vein of right lower extremity (H)      Apixaban Starter Pack (ELIQUIS DVT/PE STARTER PACK) 5 MG TBPK Take 10 mg by mouth 2 times daily for 7 days, THEN 5 mg 2 times daily for 23 days.  Qty: 1 each, Refills: 0    Associated Diagnoses: Acute saddle pulmonary embolism with acute cor pulmonale (H); Acute deep vein thrombosis (DVT) of femoral vein of right lower extremity (H)      ibuprofen (ADVIL/MOTRIN) 400 MG tablet Take 1 tablet (400 mg) by mouth every 6 hours as needed for inflammatory pain    Associated Diagnoses: Acute deep vein thrombosis (DVT) of femoral vein of right lower extremity (H)           CONTINUE these medications which have NOT CHANGED    Details   cetirizine (ZYRTEC) 5 MG tablet Take 5-10 mg by mouth daily as needed for allergies      losartan (COZAAR) 25 MG tablet TAKE ONE TABLET BY MOUTH ONCE DAILY  Qty: 90 tablet, Refills: 0    Comments: Patient due for follow up appointment in January.  Please have patient call and get scheduled. Thanks!  Associated Diagnoses: Benign essential hypertension           Allergies   Allergies   Allergen Reactions    Lisinopril Cough    Tea Tree Oil Rash

## 2024-03-22 NOTE — PLAN OF CARE
[facility] - ICU    RN Progress Note:            Pertinent Assessments:      Please refer to flowsheet rows for full assessment     Heparin drip infusing per orders. CMS intact to BLE. Pulses palpable. Toradol administered for dull pain with good results. Will continue to monitor.           Key Events - This Shift:          Barriers to Discharge / Downgrade:     Likely downgrade today

## 2024-03-22 NOTE — CONSULTS
3/22 Test claim for DOAC'S  Both Eliquis and Xarelto are covered 100% no cost for the patient.   Thank you for allowing me to help with your patient  Caterina Griffin [facility]  Pharmacy Discharge Liaison [facility]

## 2024-03-22 NOTE — PLAN OF CARE
Problem: Adult Inpatient Plan of Care  Goal: Plan of Care Review  Description: The Plan of Care Review/Shift note should be completed every shift.  The Outcome Evaluation is a brief statement about your assessment that the patient is improving, declining, or no change.  This information will be displayed automatically on your shift  note.  Outcome: Progressing   Goal Outcome Evaluation:    [facility] - ICU    RN Progress Note:            Pertinent Assessments:      Please refer to flowsheet rows for full assessment     Vitals stable, hemodynamic stable. Bilateral LE DVT, Mechanical thrombectomy of occlusive DVT from right external Iliac and common femoral veins this afternoon. PRN Toradol given for pain after procedure, effective. Remained on heparin drip. Sheath is still in place of popliteal vein behind R knee, intact with dry gauze.             Key Events - This Shift:     Mechanical thrombectomy of occlusive DVT from right external Iliac and common femoral veins this afternoon           Barriers to downgraded/discharge:     Ready to be downgraded      Harpal Brown RN

## 2024-03-22 NOTE — PROGRESS NOTES
"  Interventional Radiology - Progress Note  Inpatient - [facility]  03/22/2024     S:  Patient doing well well. Reports feeling 90% better today. Denies CP or SOB. Denies leg pain. No issues post procedure. Right calf site clean and dry. Discussed plan of care.        O:  /60 (BP Location: Right leg)   Pulse 70   Temp 98  F (36.7  C) (Oral)   Resp 15   Ht 1.727 m (5' 8\")   Wt 127 kg (280 lb)   SpO2 93%   BMI 42.57 kg/m    General: Stable. In no acute distress.    Neuro: Alert and oriented x 3. No focal deficits.  Psych: Appropriate mood and affect. Linear/coherent thought process.   Resp: Normal respirations. RA.  Cardio: S1S2, regular rate and rhythm,   Abdomen: Soft, non-distended.  Extremities: Mild RLE swelling. Bilateral legs compressible  Vascular: R groin site CDI. Bilateral  +2/4 bilateral dorsalis pedis pulses, +2/4 bilateral posterior tibial pulses.    Skin:  normal   MSK: No gross motor weakness. Moves all extremities equally. Sensation intact. Proprioception intact.      IMAGING:  LOCATION: [facility]  DATE: 3/21/2024     PROCEDURE: RIGHT LOWER EXTREMITY VENOGRAPHY, INFERIOR VENACAVOGRAM, ULTRASOUND GUIDANCE FOR VASCULAR ACCESS, BALLOON ANGIOPLASTY OF THE RIGHT EXTERNAL ILIAC AND COMMON ILIAC VEINS, MECHANICAL THROMBECTOMY/EXTIRPATION OF MATTER OF THE RIGHT COMMON FEMORAL   AND EXTERNAL ILIAC VEINS     INTERVENTIONAL RADIOLOGIST: Usha Ahuja MD     INDICATION: 48-year-old female with right lower extremity iliofemoral deep vein thrombosis, plan for venography with thrombectomy.     CONSENT: The risks, benefits and alternatives of the procedure were discussed with the patient  in detail. All questions were answered. Informed consent was given to proceed with the procedure.     MODERATE SEDATION: Versed 3 mg IV; Fentanyl 100 mcg IV. During the time out, immediately prior to the administration of medications, the patient was reassessed for " adequacy to receive conscious sedation.  Under physician supervision, Versed and fentanyl   were administered for moderate sedation. Pulse oximetry, heart rate and blood pressure were continuously monitored by an independent trained observer. The physician spent 30 minutes of face-to-face sedation time with the patient.     CONTRAST: 55 cc Omnipaque     FLUOROSCOPIC TIME: 5.7 minutes.  RADIATION DOSE: Air Kerma: 1079 mGy.     COMPLICATIONS: No immediate complications.     UNIVERSAL PROTOCOL: The operative site was marked and any prior imaging was reviewed. Required items including blood products, implants, devices and special equipment was made available. Patient identity was confirmed either verbally, with demographic   information, hospital assigned identification or other identification markers. A timeout was performed immediately prior to the procedure.     STERILE BARRIER TECHNIQUE: Maximum sterile barrier technique was used. Cutaneous antisepsis was performed at the operative site with application of 2% chlorhexidine and large sterile drape. Prior to the procedure, the  and assistant performed   hand hygiene and wore hat, mask, sterile gown, and sterile gloves during the entire procedure.     PROCEDURE:    RIGHT: The right popliteal vein was punctured under real-time ultrasound guidance. A micropuncture access set was advanced into the vessel. An Amplatz wire was advanced into the vessel and track dilatation was performed. Over the wire exchange was made   for a 13F Inari ClotTreiver sheath. A right lower extremity venogram was performed.     Over the wire exchange was made for a ClotTreiver BOLD basket and mechanical thrombectomy of the right common femoral and external iliac veins was performed with removal of a moderate volume of acute appearing thrombus. Post thrombectomy venography   demonstrates persistent vessel narrowing of the right common iliac and external iliac veins. Over-the-wire exchange  is made for a 16 mm x 4 cm angioplasty balloon and angioplasty of the right common iliac and external iliac veins was performed. Final   imaging demonstrates brisk in line flow through the popliteal vein, into the femoral/common femoral veins and iliac venous system. There is no significant residual stenosis or thrombosis.     At this point, the procedure was considered complete. The sheath was removed and hemostasis was achieved with the assistance of a pursestring suture at the venotomy site.     FINDINGS:  Ultrasound shows an anechoic and compressible right popliteal vein. A permanent image was stored to the patient's medical record.     Right lower extremity venography demonstrates patent above-knee popliteal vein and femoral vein. There is near occlusive thrombus within the right common femoral vein with extension into the right external iliac vein. The right common iliac vein appears   diminutive but patent. Inferior vena cava is patent.                                                                      IMPRESSION:    1. Successful mechanical thrombectomy of the right common femoral and external iliac veins with removal of a moderate volume of acute appearing thrombus.  2. Post procedure venography demonstrates brisk in line flow through the right lower extremity into the iliac system and inferior vena cava.     PLAN:  Plan to remove the pursestring suture on 3/22/2024. Resume full dose anticoagulation and transition to a direct oral anticoagulant.    LABS:  Recent Labs   Lab 03/22/24  0428 03/21/24  0634 03/21/24  0222 03/20/24  1918 03/20/24  1847   WBC 5.4 7.0 9.0  --  8.0   HGB 12.7 13.4 14.2  --  15.8*    200 206  --  265   INR  --   --  1.06  --  0.99   CR 0.65 0.58  --  0.7 0.70   BILITOTAL  --  0.4  --   --   --    ALKPHOS  --  114  --   --   --    AST  --  40  --   --   --    ALT  --  30  --   --   --            A:  Breanna Fischer is a 48 year old female with past medical Hx of HTN, obesity  presented to ED 3/20 for CP and SOB. Diagnostic work up revealed bilateral pulmonary emboli including saddle embolus as well as with CT evidence of possible right heart strain. US demonstrated bilateral LE DVT. S/p pulmonary mechanical thrombectomy 3/20 S. S/p RLE mechanical thrombectomy 3/21. Stable post procedures. Clinically stable    P:    - Plan for IR tech to remove pursestring sutures and apply new dressing today.  - Monitor access site site for bleeding prior to discharge  - Transition to oral anticoagulation per hematology recommendations.  - Post venogram/mechanical thrombectomy cares discussed with patient. Questions answered. Venogram/mechanicalthrombectomy D/C instructions entered into navigator.  - No IR follow up recommended  - The above discussedwith patient. Questions answered  - OK to discharge from IR prescriptive once patient has successfully transitioned to oral anticoagulation and medically stable.   - Please contact IR with questions or concerns.        Total time spent on the date of the encounter: 45 minutes.    EVIN ALCALA CNP  Interventional Radiology  [phone]

## 2024-03-22 NOTE — CONSULTS
Both Eliquis and Xarelto covered 100% no cost for the patient.   ,Caterina Griffin [facility]  Pharmacy Discharge Liaison [facility]

## 2024-03-22 NOTE — PROGRESS NOTES
Breanna is discharging to home. Son will pick her up. IV's removed. All belongings returned and sent with patient. Medications will be delivered from [facility] discharge pharmacy. All discharge instructions completed and reviewed with patient.

## 2024-03-23 ENCOUNTER — MYC MEDICAL ADVICE (OUTPATIENT)
Dept: FAMILY MEDICINE | Facility: CLINIC | Age: 49
End: 2024-03-23
Payer: COMMERCIAL

## 2024-03-24 ENCOUNTER — PATIENT OUTREACH (OUTPATIENT)
Dept: CARE COORDINATION | Facility: CLINIC | Age: 49
End: 2024-03-24
Payer: COMMERCIAL

## 2024-03-24 NOTE — PROGRESS NOTES
Connected Care Resource Center Contact  [facility]/Voicemail     Clinical Data: Post-Discharge Outreach     Outreach attempted x 2.  Left message on patient's voicemail, providing [facility]'s central phone number of [phone] ([phone]) for questions/concerns and/or to schedule an appt with an [facility] provider, if they do not have a PCP.      Plan:  [facility] will do no further outreaches at this time.       THERESA Watson  Connected Care Resource Center, [facility]    *Connected Care Resource Team does NOT follow patient ongoing. Referrals are identified based on internal discharge reports and the outreach is to ensure patient has an understanding of their discharge instructions.

## 2024-03-25 NOTE — TELEPHONE ENCOUNTER
I want the patient sooner please. OK to get her in on my Thursday same day slot . Will teams message Estefany MCKEE to help.     Thank you  Luciana Franco MD on 3/25/2024 at 11:03 AM

## 2024-03-25 NOTE — TELEPHONE ENCOUNTER
Pt has appointment scheduled for 3/28/24 with PCP.     Marcella Medina RN on 3/25/2024 at 1:53 PM

## 2024-03-26 LAB
PROT C ACT/NOR PPP CHRO: 111 % (ref 70–170)
PROT S FREE AG ACT/NOR PPP IA: 84 % (ref 55–125)

## 2024-03-28 ENCOUNTER — OFFICE VISIT (OUTPATIENT)
Dept: FAMILY MEDICINE | Facility: CLINIC | Age: 49
End: 2024-03-28
Payer: COMMERCIAL

## 2024-03-28 VITALS
SYSTOLIC BLOOD PRESSURE: 133 MMHG | TEMPERATURE: 98.3 F | BODY MASS INDEX: 42.44 KG/M2 | HEIGHT: 68 IN | DIASTOLIC BLOOD PRESSURE: 83 MMHG | OXYGEN SATURATION: 97 % | RESPIRATION RATE: 14 BRPM | WEIGHT: 280 LBS | HEART RATE: 70 BPM

## 2024-03-28 DIAGNOSIS — G44.219 EPISODIC TENSION-TYPE HEADACHE, NOT INTRACTABLE: ICD-10-CM

## 2024-03-28 DIAGNOSIS — I10 BENIGN ESSENTIAL HYPERTENSION: ICD-10-CM

## 2024-03-28 DIAGNOSIS — I82.411 ACUTE DEEP VEIN THROMBOSIS (DVT) OF FEMORAL VEIN OF RIGHT LOWER EXTREMITY (H): ICD-10-CM

## 2024-03-28 DIAGNOSIS — R25.2 LEG CRAMPS: ICD-10-CM

## 2024-03-28 DIAGNOSIS — K90.0 CELIAC DISEASE: ICD-10-CM

## 2024-03-28 DIAGNOSIS — E66.01 MORBID OBESITY (H): ICD-10-CM

## 2024-03-28 DIAGNOSIS — I26.02 ACUTE SADDLE PULMONARY EMBOLISM WITH ACUTE COR PULMONALE (H): Primary | ICD-10-CM

## 2024-03-28 LAB
ANION GAP SERPL CALCULATED.3IONS-SCNC: 13 MMOL/L (ref 7–15)
BUN SERPL-MCNC: 16.3 MG/DL (ref 6–20)
CALCIUM SERPL-MCNC: 9.4 MG/DL (ref 8.6–10)
CHLORIDE SERPL-SCNC: 104 MMOL/L (ref 98–107)
CREAT SERPL-MCNC: 0.69 MG/DL (ref 0.51–0.95)
DEPRECATED HCO3 PLAS-SCNC: 20 MMOL/L (ref 22–29)
EGFRCR SERPLBLD CKD-EPI 2021: >90 ML/MIN/1.73M2
GLUCOSE SERPL-MCNC: 128 MG/DL (ref 70–99)
IRON BINDING CAPACITY (ROCHE): 234 UG/DL (ref 240–430)
IRON SATN MFR SERPL: 28 % (ref 15–46)
IRON SERPL-MCNC: 65 UG/DL (ref 37–145)
MAGNESIUM SERPL-MCNC: 2 MG/DL (ref 1.7–2.3)
POTASSIUM SERPL-SCNC: 4.3 MMOL/L (ref 3.4–5.3)
SODIUM SERPL-SCNC: 137 MMOL/L (ref 135–145)
VIT B12 SERPL-MCNC: 411 PG/ML (ref 232–1245)
VIT D+METAB SERPL-MCNC: 20 NG/ML (ref 20–50)

## 2024-03-28 PROCEDURE — 82306 VITAMIN D 25 HYDROXY: CPT | Performed by: INTERNAL MEDICINE

## 2024-03-28 PROCEDURE — 82607 VITAMIN B-12: CPT | Performed by: INTERNAL MEDICINE

## 2024-03-28 PROCEDURE — 36415 COLL VENOUS BLD VENIPUNCTURE: CPT | Performed by: INTERNAL MEDICINE

## 2024-03-28 PROCEDURE — 99496 TRANSJ CARE MGMT HIGH F2F 7D: CPT | Performed by: INTERNAL MEDICINE

## 2024-03-28 PROCEDURE — 80048 BASIC METABOLIC PNL TOTAL CA: CPT | Performed by: INTERNAL MEDICINE

## 2024-03-28 PROCEDURE — 83735 ASSAY OF MAGNESIUM: CPT | Performed by: INTERNAL MEDICINE

## 2024-03-28 PROCEDURE — 83540 ASSAY OF IRON: CPT | Performed by: INTERNAL MEDICINE

## 2024-03-28 PROCEDURE — 83550 IRON BINDING TEST: CPT | Performed by: INTERNAL MEDICINE

## 2024-03-28 RX ORDER — GABAPENTIN 100 MG/1
100 CAPSULE ORAL DAILY PRN
Qty: 30 CAPSULE | Refills: 0 | Status: SHIPPED | OUTPATIENT
Start: 2024-03-28 | End: 2024-09-05

## 2024-03-28 RX ORDER — LOSARTAN POTASSIUM 25 MG/1
25 TABLET ORAL DAILY
Qty: 90 TABLET | Refills: 2 | Status: SHIPPED | OUTPATIENT
Start: 2024-03-28 | End: 2024-07-09

## 2024-03-28 ASSESSMENT — PAIN SCALES - GENERAL: PAINLEVEL: NO PAIN (0)

## 2024-03-28 NOTE — PROGRESS NOTES
Assessment and Plan  1. Acute saddle pulmonary embolism with acute cor pulmonale (H)  2. Acute deep vein thrombosis (DVT) of femoral vein of right lower extremity (H)    Recent hospitalization and discharge from 3/20 - 3/22 from [facility] with diagnosis of Acute Saddle pulmonary embolism , Acute RV strain due to massive PE , Rt Occlusive DVT of Rt external Iliac and Common Femoral V >. S/P Pulmonary and Rt Femoral thrombectomy .     Does have risk factors of Morbid Obesity with BMI 42.5 .   Echo on 3/21 showing LVEF 65% , LVH , flattened septum depicting RV pressures. Does have sedentary lifetyle but no other risk factors , denies FH or OCPs / HRT.  This is Unprovoked PE and may need longterm OAC versus indefinite which we discussed most part of this appointment. To keep up Hemoncology appointment as scheduled in 3 months which pt states she is following the same   - apixaban ANTICOAGULANT (ELIQUIS) 5 MG tablet; Take 1 tablet (5 mg) by mouth 2 times daily  Dispense: 60 tablet; Refill: 1    3. Morbid obesity (H)  Uncontrolled, with BMI at 42 which could be contributing factor given pt sedentary lifestyle as she endorses. Will place referral to weight management as per shared decision.   - Adult Comprehensive Weight Management  Referral; Future    4. Leg cramps  New problem, with bilateral DVTs as mentioned above. Differential diagnosis of electrolyte abnormalities as well as muscle spasms which we will treat with muscle relaxors as needed for improvement.   - Basic metabolic panel  (Ca, Cl, CO2, Creat, Gluc, K, Na, BUN); Future  - Magnesium; Future  - tiZANidine (ZANAFLEX) 4 MG tablet; Take 1 tablet (4 mg) by mouth nightly as needed for muscle spasms  Dispense: 30 tablet; Refill: 1  - Iron and iron binding capacity; Future  - Vitamin B12; Future  - Basic metabolic panel  (Ca, Cl, CO2, Creat, Gluc, K, Na, BUN)  - Magnesium  - Iron and iron binding capacity  - Vitamin B12  - CBC with platelets; Future    5.  Episodic tension-type headache, not intractable  Pt has occassional severe headaches for which she has been having questions on Ibuprofen usage. This does have interaction with current Elquis which is not a good choice. Also contraindications with aspirin , fiorinal or excedrin migraine for increased bleeding risk.   - Will consider as needed gabapentin at this time and notified the pt  - Placed referral to neurology   - gabapentin (NEURONTIN) 100 MG capsule; Take 1 capsule (100 mg) by mouth daily as needed for other (migraine)  Dispense: 30 capsule; Refill: 0  - Adult Neurology  Referral; Future    6. Celiac disease  - Vitamin D Deficiency; Future  - Vitamin D Deficiency    7. Benign essential hypertension  - losartan (COZAAR) 25 MG tablet; Take 1 tablet (25 mg) by mouth daily  Dispense: 90 tablet; Refill: 2     CT PE -     IMPRESSION:  1.  Multiple pulmonary emboli bilaterally including large saddle pulmonary embolus. Elevated right heart pressure.    US DVT -     IMPRESSION:  1.  On the right, occlusive DVT involving the external iliac and common femoral veins.     2.  On the left, occlusive DVT involving the popliteal vein extending into the posterior tibial and peroneal veins to the mid/distal calf.     3.  Elongated left popliteal cyst.        MED REC REQUIRED  Post Medication Reconciliation Status:  Discharge medications reconciled and changed, see notes/orders      Upto 60 minutes spent on reviewing patient chart,  face to face encounter, greater than 50% time spent with plan/cordination of care and documentation as above in my A/P.          Please note that this note consists of symbols derived from keyboarding, dictation and/or voice recognition software. As a result, there may be errors in the script that have gone undetected. Please consider this when interpreting information found in this chart.    Patient Instructions   As discussed, will check electrolytes as well as make sure its repleted.      Will do pertinent work up for your leg cramps.     Will place weight management referral .     ==============================        Return in about 6 weeks (around 5/10/2024), or if symptoms worsen or fail to improve, for If symptoms persist, Follow up of last visit.    Luciana Franco MD  [facility] ANGELA Carlos is a 48 year old, presenting for the following health issues:  Hospital F/U        3/28/2024     8:25 AM   Additional Questions   Roomed by Alison Woodard CMA   Accompanied by self     Via the Health Maintenance questionnaire, the patient has reported the following services have been completed -Mammogram, this information has been sent to the abstraction team.  [facility]       Hospital Follow-up Visit:    Hospital/Nursing Home/IP Rehab Facility: [facility]  Date of Admission: 03/20/24  Date of Discharge: 03/22/24  Reason(s) for Admission: left calf pain, chest pressure and shortness of breath and found to have saddle PE with right heart strain     Was your hospitalization related to COVID-19? No   Problems taking medications regularly:  None  Medication changes since discharge: None  Problems adhering to non-medication therapy:  None    Summary of hospitalization:  [facility] discharge summary reviewed  Diagnostic Tests/Treatments reviewed.  Follow up needed: PCP , Hematology   Other Healthcare Providers Involved in Patient s Care:         Homecare  Update since discharge: improved.         Plan of care communicated with patient    Last seen pt in 7/2023 for annual physical at that time. She is here for Hospital follow up.        Allergies   Allergen Reactions    Lisinopril Cough    Tea Tree Oil Rash        Past Medical History:   Diagnosis Date    Celiac disease     Hypertension        Past Surgical History:   Procedure Laterality Date    ABDOMEN SURGERY  1999    Gall bladder    CHOLECYSTECTOMY      ENT SURGERY      IR [facility]  "VENOUS THROMB  3/21/2024    IR PULMONARY ANGIOGRAM BILATERAL  3/20/2024       Family History   Problem Relation Age of Onset    Obesity Mother     Thyroid Disease Mother     Diabetes Mother     Hypertension Mother     Thyroid Disease Father     Diabetes Father     Hypertension Father     Obesity Father     Breast Cancer Paternal Grandmother     Obesity Sister     Thyroid Disease Sister     Cerebrovascular Disease Sister     Other Cancer Sister     Asthma Sister     Melanoma No family hx of     Skin Cancer No family hx of        Social History     Tobacco Use    Smoking status: Never    Smokeless tobacco: Never   Substance Use Topics    Alcohol use: No        Current Outpatient Medications   Medication    acetaminophen (TYLENOL) 325 MG tablet    [START ON 4/21/2024] apixaban ANTICOAGULANT (ELIQUIS) 5 MG tablet    Apixaban Starter Pack (ELIQUIS DVT/PE STARTER PACK) 5 MG TBPK    cetirizine (ZYRTEC) 5 MG tablet    gabapentin (NEURONTIN) 100 MG capsule    ibuprofen (ADVIL/MOTRIN) 400 MG tablet    losartan (COZAAR) 25 MG tablet    tiZANidine (ZANAFLEX) 4 MG tablet     No current facility-administered medications for this visit.          Review of Systems  Constitutional, HEENT, cardiovascular, pulmonary, GI, , musculoskeletal, neuro, skin, endocrine and psych systems are negative, except as otherwise noted.      Objective    /83 (BP Location: Left arm, Patient Position: Sitting, Cuff Size: Adult Large)   Pulse 70   Temp 98.3  F (36.8  C)   Resp 14   Ht 1.727 m (5' 8\")   Wt 127 kg (280 lb)   SpO2 97%   BMI 42.57 kg/m    Body mass index is 42.57 kg/m .  Physical Exam   GENERAL: alert and no distress  NECK: no adenopathy, no asymmetry, masses, or scars  RESP: lungs clear to auscultation - no rales, rhonchi or wheezes  CV: regular rate and rhythm, normal S1 S2, no S3 or S4, no murmur, click or rub, no peripheral edema  ABDOMEN: soft, nontender, no hepatosplenomegaly, no masses and bowel sounds normal  MS: no " gross musculoskeletal defects noted, + non pitting edema bilaterally . Well healed RT popliteal thrombectomy site and Rt groin catheterization site of pulmonary thrombectomy as well.        Signed Electronically by: Luciana Franco MD

## 2024-03-28 NOTE — PATIENT INSTRUCTIONS
As discussed, will check electrolytes as well as make sure its repleted.     Will do pertinent work up for your leg cramps.     Will place weight management referral .     ==============================

## 2024-03-29 ENCOUNTER — PATIENT OUTREACH (OUTPATIENT)
Dept: ONCOLOGY | Facility: CLINIC | Age: 49
End: 2024-03-29
Payer: COMMERCIAL

## 2024-03-29 ENCOUNTER — TRANSCRIBE ORDERS (OUTPATIENT)
Dept: OTHER | Age: 49
End: 2024-03-29

## 2024-03-29 DIAGNOSIS — I26.99 PULMONARY EMBOLISM (H): ICD-10-CM

## 2024-03-29 DIAGNOSIS — I82.409 DVT (DEEP VENOUS THROMBOSIS) (H): Primary | ICD-10-CM

## 2024-03-29 NOTE — PROGRESS NOTES
[facility]: Hematology                                                                Hem/Onc  Referral reviewed    Adult Oncology/Hematology  Referral [[id number]]  Transcribed by: Jesika Mireles on 03/29/24 1145     Referral Details    Referred By  Referred To   SelfJanuary MD   Fax: [phone]    Diagnoses: Pulmonary embolism (H)   DVT (deep venous thrombosis) (H)   Order: Adult Oncology/Hematology  Referral    Hematology  Dr Arianna Zhu   Comment: patient call/   PE, DVT/   was seen in hospital by Dr Zhu,   pt says discharge papers say follow up in June/   pt would prefer to see Dr Zhu in [facility]      Clinical History (per Nurse review of records provided):    48 year old female patient referred as above    Records Location: [facility]  provider note(s)-- BOOKMARKED    INTERVENTION(S)                                                      -Referral Triage Scheduling Instructions added to Hem/Onc  referral for Patient Access rep ( number below, hours are Monday - Friday 8am - 4:30 pm) who will contact pt in the next 1-2 business days to schedule the consultation.    PLAN                                                      Hematology consult with Dr Zhu      See records team's Pre-Visit encounter documentation for additional records retrieval information.    Larisa Griffiths, RN, BSN, OCN  Hematology/Oncology New Patient Nurse Navigator   [facility]  [phone]

## 2024-04-01 NOTE — TELEPHONE ENCOUNTER
RECORDS STATUS - ALL OTHER DIAGNOSIS      RECORDS RECEIVED FROM: Epic   DATE RECEIVED:    NOTES STATUS DETAILS   OFFICE NOTE from referring provider SELF    OFFICE NOTE from PCP Epic 03/28/24: Dr. Luciana Franco   DISCHARGE SUMMARY from hospital [facility] 03/20/24: [facility]   MEDICATION LIST [facility]    LABS     PATHOLOGY REPORTS     ANYTHING RELATED TO DIAGNOSIS Epic Most recent 03/28/24   IMAGING (NEED IMAGES & REPORT)     ULTRASOUND PACS 03/21/24: US Lower Extrem   CT SCANS PACS 03/20/24: CT Chest

## 2024-04-03 ENCOUNTER — NURSE TRIAGE (OUTPATIENT)
Dept: FAMILY MEDICINE | Facility: CLINIC | Age: 49
End: 2024-04-03
Payer: COMMERCIAL

## 2024-04-03 ENCOUNTER — APPOINTMENT (OUTPATIENT)
Dept: CT IMAGING | Facility: HOSPITAL | Age: 49
End: 2024-04-03
Attending: EMERGENCY MEDICINE
Payer: COMMERCIAL

## 2024-04-03 ENCOUNTER — HOSPITAL ENCOUNTER (EMERGENCY)
Facility: HOSPITAL | Age: 49
Discharge: HOME | End: 2024-04-03
Attending: EMERGENCY MEDICINE | Admitting: EMERGENCY MEDICINE
Payer: COMMERCIAL

## 2024-04-03 ENCOUNTER — PATIENT OUTREACH (OUTPATIENT)
Dept: CARE COORDINATION | Facility: CLINIC | Age: 49
End: 2024-04-03

## 2024-04-03 VITALS
DIASTOLIC BLOOD PRESSURE: 73 MMHG | BODY MASS INDEX: 42.44 KG/M2 | HEART RATE: 84 BPM | SYSTOLIC BLOOD PRESSURE: 144 MMHG | TEMPERATURE: 97.8 F | HEIGHT: 68 IN | OXYGEN SATURATION: 97 % | WEIGHT: 280 LBS | RESPIRATION RATE: 16 BRPM

## 2024-04-03 DIAGNOSIS — G44.209 TENSION HEADACHE: ICD-10-CM

## 2024-04-03 PROCEDURE — 70450 CT HEAD/BRAIN W/O DYE: CPT

## 2024-04-03 PROCEDURE — 250N000013 HC RX MED GY IP 250 OP 250 PS 637: Performed by: EMERGENCY MEDICINE

## 2024-04-03 PROCEDURE — 99285 EMERGENCY DEPT VISIT HI MDM: CPT | Mod: 25

## 2024-04-03 PROCEDURE — 250N000011 HC RX IP 250 OP 636: Mod: JZ | Performed by: EMERGENCY MEDICINE

## 2024-04-03 PROCEDURE — 96374 THER/PROPH/DIAG INJ IV PUSH: CPT

## 2024-04-03 RX ORDER — CYCLOBENZAPRINE HCL 10 MG
10 TABLET ORAL ONCE
Status: COMPLETED | OUTPATIENT
Start: 2024-04-03 | End: 2024-04-03

## 2024-04-03 RX ORDER — METOCLOPRAMIDE HYDROCHLORIDE 5 MG/ML
10 INJECTION INTRAMUSCULAR; INTRAVENOUS ONCE
Status: COMPLETED | OUTPATIENT
Start: 2024-04-03 | End: 2024-04-03

## 2024-04-03 RX ADMIN — METOCLOPRAMIDE 10 MG: 5 INJECTION, SOLUTION INTRAMUSCULAR; INTRAVENOUS at 08:45

## 2024-04-03 RX ADMIN — CYCLOBENZAPRINE HYDROCHLORIDE 10 MG: 10 TABLET, FILM COATED ORAL at 08:41

## 2024-04-03 ASSESSMENT — COLUMBIA-SUICIDE SEVERITY RATING SCALE - C-SSRS
1. IN THE PAST MONTH, HAVE YOU WISHED YOU WERE DEAD OR WISHED YOU COULD GO TO SLEEP AND NOT WAKE UP?: NO
2. HAVE YOU ACTUALLY HAD ANY THOUGHTS OF KILLING YOURSELF IN THE PAST MONTH?: NO
6. HAVE YOU EVER DONE ANYTHING, STARTED TO DO ANYTHING, OR PREPARED TO DO ANYTHING TO END YOUR LIFE?: NO

## 2024-04-03 ASSESSMENT — ACTIVITIES OF DAILY LIVING (ADL)
ADLS_ACUITY_SCORE: 38
ADLS_ACUITY_SCORE: 38

## 2024-04-03 NOTE — ED NOTES
Pt alert and oriented x4. Ambulated independently with a steady gait. Discharged to home with spouse. Instructed to follow up with neurology. Pt given printed rx.

## 2024-04-03 NOTE — DISCHARGE INSTRUCTIONS
Tizanidine, Tylenol as needed for headache.  Given the white matter changes on CT would recommend that you follow-up with neurology routine as outpatient.  Referral provided.

## 2024-04-03 NOTE — ED PROVIDER NOTES
EMERGENCY DEPARTMENT ENCOUNTER      NAME: Breanna Fischer  AGE: 48 year old female  YOB: 1975  MRN: [id number]  EVALUATION DATE & TIME: 4/3/2024  8:28 AM    PCP: Luciana Franco    ED PROVIDER: Nancy Hernandez MD    Chief Complaint   Patient presents with    Headache         FINAL IMPRESSION:  1. Tension headache          ED COURSE & MEDICAL DECISION MAKING:    Pertinent Labs & Imaging studies reviewed. (See chart for details)  48 year old female with history of HTN and recent admission for submassive PE/DVT status post thrombectomy of the PE and right femoral DVT on Eliquis who presents to the Emergency Department for evaluation of 3 days of an occipital headache.  Symptoms seem most consistent with tension headache.  The pain is reproducible with movement and palpation.  However seems how she is newly anticoagulated will obtain neuroimaging to rule out ICH overall my suspicion is low however.  She does not have any fevers, chills and no nuchal rigidity or meningismus on exam to warrant workup for meningitis.    IV established.  Patient given 10 mg Reglan as well as 10 mg Flexeril.  CT head unremarkable for acute pathology though there is evidence of hypoattenuation of the cerebral white matter consistent with either small vessel ischemic disease that seems advanced for age versus a demyelinating process.  I do not think that this is contributing to patient's headache and she felt improved after the above.  Will discharged home with muscle relaxer as needed.  Given outpatient neurology follow-up given the CT findings.  Warning signs return to ED discussed.      ED Course as of 04/03/24 1326   Wed Apr 03, 2024   0925 Head CT w/o contrast  CT head independently interpreted by myself without visualized ICH       Medical Decision Making    History:  Supplemental history from: Family Member/Significant Other  External Record(s) reviewed: Inpatient Record: 3/22/24 discharge summary    Work Up:  Chart  documentation includes differential considered and any EKGs or imaging independently interpreted by provider, see MDM  In additional to work up documented, I considered the following work up: see MDM    External consultation:  Discussion of management with another provider: N/A    Complicating factors:  Care impacted by chronic illness: Anticoagulated State and Hypertension  Care affected by social determinants of health: Access to Medical Care referred to ED    Disposition considerations: Discharge. I prescribed additional prescription strength medication(s) as charted. See documentation for any additional details.        At the conclusion of the encounter I discussed the results of all of the tests and the disposition. The questions were answered. The patient or family acknowledged understanding and was agreeable with the care plan.      MEDICATIONS GIVEN IN THE EMERGENCY:  Medications   metoclopramide (REGLAN) injection 10 mg (10 mg Intravenous $Given 4/3/24 2719)   cyclobenzaprine (FLEXERIL) tablet 10 mg (10 mg Oral $Given 4/3/24 9319)       NEW PRESCRIPTIONS STARTED AT TODAY'S ER VISIT  Discharge Medication List as of 4/3/2024 10:22 AM        START taking these medications    Details   !! tiZANidine (ZANAFLEX) 4 MG tablet Take 1 tablet (4 mg) by mouth 3 times daily as needed for muscle spasms, Disp-6 tablet, R-0, Local Print       !! - Potential duplicate medications found. Please discuss with provider.             =================================================================    HPI    Patient information was obtained from: Patient and son    Use of Intrepreter: N/A      Breanna Fischer is a 48 year old female with pertinent medical history of HTN who presents headache.  6 days ago patient had a frontal/retro-orbital headache that she describes of her usual migraine with associated visual changes, aura/blurry vision.  That then resolved.  Now 3 days ago she developed a occipital headache.  Notes that this is at  "the \"bumps on my skull\" on the occiput and points to the back of the head.  He states it is constant, aching.  Somewhat worse with neck movement.  No visual changes, nausea, vomiting.    Patient just hospitalized here from 3/20 through 3/22 for saddle PE with RV strain due to massive PE status post thrombectomy of the PE in addition to right femoral DVT discharged home on Eliquis and compliant with same.    Called PCP who called in a prescription for tizanidine and gabapentin for the symptoms because of the Eliquis she is limited with NSAIDs and has not been responding to Tylenol.  This is getting done through the [facility] mail order pharmacy and she has yet to get these prescriptions to trial.    Additionally, she simply feels fatigued since the PE, but has not been having any ongoing leg pain, chest pain and notes really only minimal dyspnea.        PAST MEDICAL HISTORY:  Past Medical History:   Diagnosis Date    Celiac disease     Hypertension        PAST SURGICAL HISTORY:  Past Surgical History:   Procedure Laterality Date    ABDOMEN SURGERY  1999    Gall bladder    CHOLECYSTECTOMY      ENT SURGERY      [facility] VENOUS THROMB  3/21/2024    IR PULMONARY ANGIOGRAM BILATERAL  3/20/2024       CURRENT MEDICATIONS:    Prior to Admission Medications   Prescriptions Last Dose Informant Patient Reported? Taking?   Apixaban Starter Pack (ELIQUIS DVT/PE STARTER PACK) 5 MG TBPK   No No   Sig: Take 10 mg by mouth 2 times daily for 7 days, THEN 5 mg 2 times daily for 23 days.   acetaminophen (TYLENOL) 325 MG tablet   No No   Sig: Take 2 tablets (650 mg) by mouth every 4 hours as needed for mild pain or other (and adjunct with moderate or severe pain or per patient request)   apixaban ANTICOAGULANT (ELIQUIS) 5 MG tablet   No No   Sig: Take 1 tablet (5 mg) by mouth 2 times daily for 90 days   cetirizine (ZYRTEC) 5 MG tablet  Self Yes No   Sig: Take 5-10 mg by mouth daily as needed for allergies   gabapentin (NEURONTIN) 100 MG " "capsule   No No   Sig: Take 1 capsule (100 mg) by mouth daily as needed for other (migraine)   ibuprofen (ADVIL/MOTRIN) 400 MG tablet   No No   Sig: Take 1 tablet (400 mg) by mouth every 6 hours as needed for inflammatory pain   losartan (COZAAR) 25 MG tablet   No No   Sig: Take 1 tablet (25 mg) by mouth daily   tiZANidine (ZANAFLEX) 4 MG tablet   No No   Sig: Take 1 tablet (4 mg) by mouth nightly as needed for muscle spasms      Facility-Administered Medications: None       ALLERGIES:  Allergies   Allergen Reactions    Lisinopril Cough    Tea Tree Oil Rash       FAMILY HISTORY:  Family History   Problem Relation Age of Onset    Obesity Mother     Thyroid Disease Mother     Diabetes Mother     Hypertension Mother     Thyroid Disease Father     Diabetes Father     Hypertension Father     Obesity Father     Breast Cancer Paternal Grandmother     Obesity Sister     Thyroid Disease Sister     Cerebrovascular Disease Sister     Other Cancer Sister     Asthma Sister     Melanoma No family hx of     Skin Cancer No family hx of        SOCIAL HISTORY:  Social History     Tobacco Use    Smoking status: Never    Smokeless tobacco: Never   Substance Use Topics    Alcohol use: No    Drug use: No        VITALS:  Patient Vitals for the past 24 hrs:   BP Temp Temp src Pulse Resp SpO2 Height Weight   04/03/24 1030 (!) 144/73 -- -- 84 -- 97 % -- --   04/03/24 1028 -- -- -- 86 16 97 % -- --   04/03/24 0820 (!) 142/95 97.8  F (36.6  C) Oral 104 16 95 % 1.727 m (5' 8\") 127 kg (280 lb)       PHYSICAL EXAM    General Appearance: Well-appearing, well-nourished, no acute distress   Head:  Normocephalic, atraumatic  Eyes:  PERRL, conjunctiva/corneas clear, EOM's intact  ENT:   membranes are moist without pallor  Neck:  Supple, mild tenderness to palpation along the upper cervicals spinous muscles bilaterally as they insert on the skull base.  No palpable spasm.  Cardio:  Regular rate and rhythm  Pulm:  No respiratory distress  Abdomen: " Obese  Extremities: Moves all extremities normally, normal gait  Skin:  Skin warm, dry, no rashes  Neuro:  Alert and oriented ×3, cranial nerves III through XII intact, moving all extremities with 5 out of 5 upper lower extremity strength, no gross sensory defects, and no aphasia or dysarthria     RADIOLOGY/LABS:  Reviewed all pertinent imaging. Please see official radiology report. All pertinent labs reviewed and interpreted.    Results for orders placed or performed during the hospital encounter of 04/03/24   Head CT w/o contrast    Impression    IMPRESSION:  1.  Nonspecific hypoattenuation throughout the cerebral white matter which could represent advanced for age small vessel ischemic disease; suggest correlation with history. Demyelinating process could conceivably have a similar appearance.  2.  No acute intracranial hemorrhage.           Nancy Hernandez MD  Emergency Medicine  [facility] EMERGENCY DEPARTMENT  [facility] [facility] [id number] [phone]  Dept: [phone]     Nancy Hernandez MD  04/03/24 4757

## 2024-04-03 NOTE — ED TRIAGE NOTES
Patient presents here with a headache that has occurred over the past three days. Hx of migraines. She notes an aura of blurred vision, which has cleared. She is locating her pain at the base of her skull and in the occipital area. Intermittent nausea and general sense of not feeling well. She has a history of a saddle PE and DVT to right leg.

## 2024-04-03 NOTE — TELEPHONE ENCOUNTER
Painful to touch its a 4 at the base of skull    Huddled with Dr. Juarez who agrees with RN rec to air on side of caution and patient to go to ER for Eval since imaging may be needed   Reason for Disposition   Unexplained headache that is present > 24 hours    Additional Information   Negative: Difficult to awaken or acting confused (e.g., disoriented, slurred speech)   Negative: Weakness of the face, arm or leg on one side of the body and new-onset   Negative: Numbness of the face, arm or leg on one side of the body and new-onset   Negative: Loss of speech or garbled speech and new-onset   Negative: Passed out (i.e., fainted, collapsed and was not responding)   Negative: Sounds like a life-threatening emergency to the triager   Negative: Followed a head injury within last 3 days   Negative: Traumatic Brain Injury (TBI) is suspected   Negative: Sinus pain of forehead and yellow or green nasal discharge   Negative: Pregnant   Negative: SEVERE headache, states 'worst headache' of life   Negative: SEVERE headache, sudden-onset (i.e., reaching maximum intensity within seconds to 1 hour)   Negative: Severe pain in one eye   Negative: Loss of vision or double vision  (Exception: Same as prior migraines.)   Negative: Patient sounds very sick or weak to the triager   Negative: SEVERE headache (e.g., excruciating) and has had severe headaches before   Negative: SEVERE headache and not relieved by pain meds   Negative: SEVERE headache and vomiting   Negative: SEVERE headache and fever   Negative: New headache and weak immune system (e.g., HIV positive, cancer chemo, splenectomy, organ transplant, chronic steroids)   Negative: Fever present > 3 days (72 hours)   Negative: Patient wants to be seen   Negative: Fever > 103 F (39.4 C)   Negative: Fever > 100.0 F (37.8 C) and has diabetes mellitus or a weak immune system (e.g., HIV positive, cancer chemotherapy, organ transplant, splenectomy, chronic steroids)    Protocols used:  Headache-A-OH

## 2024-04-05 ENCOUNTER — MYC MEDICAL ADVICE (OUTPATIENT)
Dept: FAMILY MEDICINE | Facility: CLINIC | Age: 49
End: 2024-04-05

## 2024-04-05 NOTE — TELEPHONE ENCOUNTER
Please see [facility] PAChart message and advise.   Hospital visit see 4/3/24. Referral pended if needed.     Mariela MORRISON RN  [facility]

## 2024-04-09 NOTE — TELEPHONE ENCOUNTER
Referral faxed to [phone]. Spoke to pt and let her know referral was faxed, pt stated understanding.     Marcella Medina RN on 4/9/2024 at 5:42 PM

## 2024-04-18 NOTE — RESULT ENCOUNTER NOTE
"Your lab work is POSITIVE for Prediabetes. Please follow the diet below for improvement. Will need follow up on this.     All your other labs normal, you may see some highlighted which do not have Clinical significance.    Luciana Franco MD on 2/9/2022    ================================  American Diabetes Association (ADA) nutritional guidelines, which do not give specific total dietary compositional targets except for the following recommendations [1] that are in large part similar to the recommendations for the general population (see \"Healthy diet in adults\"):  ?A diet that includes carbohydrates from fruits, vegetables, whole grains, legumes, and low-fat milk is encouraged.  The ideal amount of carbohydrate intake is uncertain. However, monitoring carbohydrate intake (carbohydrate counting or experience-based estimation) is important in patients with diabetes, as carbohydrate intake directly determines postprandial blood glucose, and appropriate insulin adjustment for identified quantities of carbohydrate is one of the most important factors that can improve glycemic control.  ?A variety of eating patterns (Mediterranean, low fat, low carbohydrate, vegetarian) are acceptable.  ?Fat quality is more important than fat quantity. Trans fats contribute to coronary heart disease, while mono- and polyunsaturated fats (eg, those found in fish, olive oil, nuts) are relatively protective. Saturated fatty acids and different food sources of saturated fat have divergent effects on cardiovascular and metabolic health. Trans fatty acid consumption should be kept as low as possible.  ?Protein intake goals should be individualized but not lower than 0.8 g/kg body weight per day (the recommended daily allowance). Patients should be encouraged to substitute lean meats, fish, eggs, beans, peas, soy products, and nuts and seeds for red meat.  An automatic reduction of dietary protein intake (eg, 15 to 19 percent of calories) " PC to patient who asked for refills on her Metoprolol to go to [facility].  Explained to patient that the Coreg replaced the Metoprolol. Patient has been taking together and might be the reason she is feeling poorly. Patient agrees to not continue to take it.    below usual protein intake in patients who develop diabetic kidney disease is not recommended. The role of dietary protein restriction is uncertain, particularly in view of problems with compliance in patients already being treated with saturated fat and simple carbohydrate restriction. Furthermore, it is uncertain if a low-protein diet is significantly additive to other measures aimed at reducing cardiovascular risk and preserving renal function, such as angiotensin-converting enzyme (ACE) inhibition and aggressive control of blood pressure and blood glucose.  The usual daily intake of protein should be approximately 10 to 20 percent of total caloric intake. Higher levels of dietary protein intake (>20 percent of calories from protein or >1.3 g/kg/day) have been associated with increased albuminuria, more rapid kidney function loss, and cardiovascular disease (CVD) mortality and therefore should be avoided [69].  ?Fiber intake should be at least 14 grams per 1000 calories daily; higher fiber intake may improve glycemic control.  ?A reduced sodium intake of 2300 mg per day with a diet high in fruits, vegetables, and low-fat dairy products is prudent and has demonstrated beneficial effects on blood pressure.  ?Sugar-sweetened beverages should be avoided in order to control glycemia, weight, and reduce risk for CVD and fatty liver. Consumption of foods with added sugar that have the capacity to displace healthier, more nutrient-dense food choices should be minimized. Care should be taken to avoid excess calories from sucrose; however, foods containing sucrose may be substituted for other carbohydrates or covered with insulin or insulin secretagogue medications.  ?Sugar alcohols and non-nutritive sweeteners are safe when consumed within daily levels established by the US Food and Drug Administration (FDA). When calculating carbohydrate content of foods, one-half of the sugar alcohol content can be counted in the total  carbohydrate content of the food. Use of sugar alcohols needs to be balanced with their potential to cause gastrointestinal side effects in sensitive individuals.        AVOID THESE FOODS WITH HIGH GLYCEMIC INDEX      Dietary glycemic indices and glycemic load for the top 20 carbohydrate-contributing foods in the Nurses' Health Study in 1984  Foods Glycemic index*, % Carbohydrate per serving, g Glycemic load per serving  1. Cooked potatoes (mashed or baked)  102 37 38  2. White bread 100 13 13  3. Cold breakfast cereal Varies by cereal Varies by cereal Varies by cereal  4. Dark bread 102 12 12  5. Orange juice 75 20 15  6. Banana 88 27 24  7. White rice 102 45 46  8. Pizza 86 78 68  9. Pasta 71 40 28  10. English muffins 84 26 22  11. Fruit punch 95 44 42  12. Cola 90 39 35  13. Apple 55 21 12  14. Skim milk 46 11 5  15. Pancake 119 56 67  16. Table sugar 84 4 3  17. Jam 91 13 12  18. Cranberry juice 105 19 20  19. French fries 95 35 33  20. Candy 99 28 28    Please let me know if you have any questions.  Luciana Franco MD on 2/9/2022 at 11:08 PM

## 2024-05-08 SDOH — HEALTH STABILITY: PHYSICAL HEALTH: ON AVERAGE, HOW MANY DAYS PER WEEK DO YOU ENGAGE IN MODERATE TO STRENUOUS EXERCISE (LIKE A BRISK WALK)?: 6 DAYS

## 2024-05-08 SDOH — HEALTH STABILITY: PHYSICAL HEALTH: ON AVERAGE, HOW MANY MINUTES DO YOU ENGAGE IN EXERCISE AT THIS LEVEL?: 30 MIN

## 2024-05-08 ASSESSMENT — SOCIAL DETERMINANTS OF HEALTH (SDOH): HOW OFTEN DO YOU GET TOGETHER WITH FRIENDS OR RELATIVES?: TWICE A WEEK

## 2024-05-31 RX ORDER — BISACODYL 5 MG/1
TABLET, DELAYED RELEASE ORAL
Qty: 4 TABLET | Refills: 0 | Status: SHIPPED | OUTPATIENT
Start: 2024-05-31 | End: 2024-07-09

## 2024-06-13 ENCOUNTER — TELEPHONE (OUTPATIENT)
Dept: GASTROENTEROLOGY | Facility: CLINIC | Age: 49
End: 2024-06-13
Payer: COMMERCIAL

## 2024-06-13 NOTE — TELEPHONE ENCOUNTER
Caller: Breanna Fischer   Reason for Reschedule/Cancellation (please be detailed, any staff messages or encounters to note?):     Per pt -- cancel only       Prior to reschedule please review:  Ordering Provider:    Luciana Franco MD      Sedation Determined: moderate   Does patient have any ASC Exclusions, please identify?: n       Notes on Cancelled Procedure:  Procedure:Lower Endoscopy [Colonoscopy]   Date: 06/28/2024  Location:[facility]; [address] [place] [address], [address] Floor, [place] [address]  Surgeon: Leslee         Rescheduled: no

## 2024-06-21 ENCOUNTER — ONCOLOGY VISIT (OUTPATIENT)
Dept: ONCOLOGY | Facility: HOSPITAL | Age: 49
End: 2024-06-21
Attending: INTERNAL MEDICINE
Payer: COMMERCIAL

## 2024-06-21 ENCOUNTER — PRE VISIT (OUTPATIENT)
Dept: ONCOLOGY | Facility: HOSPITAL | Age: 49
End: 2024-06-21
Payer: COMMERCIAL

## 2024-06-21 VITALS
SYSTOLIC BLOOD PRESSURE: 146 MMHG | DIASTOLIC BLOOD PRESSURE: 73 MMHG | HEART RATE: 85 BPM | HEIGHT: 66 IN | WEIGHT: 281 LBS | RESPIRATION RATE: 18 BRPM | BODY MASS INDEX: 45.16 KG/M2 | OXYGEN SATURATION: 97 % | TEMPERATURE: 99.1 F

## 2024-06-21 DIAGNOSIS — I82.411 ACUTE DEEP VEIN THROMBOSIS (DVT) OF FEMORAL VEIN OF RIGHT LOWER EXTREMITY (H): ICD-10-CM

## 2024-06-21 DIAGNOSIS — I26.02 ACUTE SADDLE PULMONARY EMBOLISM WITH ACUTE COR PULMONALE (H): ICD-10-CM

## 2024-06-21 PROCEDURE — 99213 OFFICE O/P EST LOW 20 MIN: CPT | Performed by: INTERNAL MEDICINE

## 2024-06-21 PROCEDURE — 99214 OFFICE O/P EST MOD 30 MIN: CPT | Performed by: INTERNAL MEDICINE

## 2024-06-21 PROCEDURE — G2211 COMPLEX E/M VISIT ADD ON: HCPCS | Performed by: INTERNAL MEDICINE

## 2024-06-21 ASSESSMENT — PAIN SCALES - GENERAL: PAINLEVEL: NO PAIN (0)

## 2024-06-21 NOTE — PROGRESS NOTES
[facility] Hematology and Oncology Progress Note    Patient: Breanna Fischer  MRN: [id number]  Date of Service: Jun 21, 2024         Reason for Visit    Chief Complaint   Patient presents with    Hematology     Acute deep vein thrombosis (DVT) of femoral vein of right lower extremity (H)       Assessment and Plan     Cancer Staging   No matching staging information was found for the patient.      ECOG Performance    0 - Independent     Pain  Pain Score: No Pain (0)    #.  History of acute saddle embolus status post mechanical thrombectomy in 3/2024  #.  History of acute occlusive DVT of the external iliac and common femoral vein on the right and popliteal vein extending into the posterior tibial and peroneal vein to mid/distal calf of the left lower extremity, status post mechanical thrombectomy of the right lower extremity in 3/2024  #.  Overweight  #.  Sedentary lifestyle     Today, I reviewed the thrombophilia workup.  She does not have factor V Leiden or prothrombin gene mutation.  Antiphospholipid panel was negative.  Antithrombin III level were normal.   Given the history of life-threatening nature of DVT, she is recommended extended (no stop date) anticoagulation therapy.  She tolerates Eliquis fairly well at this point.  She agreed with the plan.  I advised her to watch for signs and symptoms of bleeding.   I also recommended her to update on age-appropriate cancer screening.   Recommended annual follow-up for long-term use of anticoagulation therapy.    The longitudinal plan of care for the diagnosis(es)/condition(s) as documented were addressed during this visit. Due to the added complexity in care, I will continue to support Breanna in the subsequent management and with ongoing continuity of care.    Encounter Diagnoses:    Problem List Items Addressed This Visit       Acute saddle pulmonary embolism with acute cor pulmonale (H)    Relevant Medications    apixaban ANTICOAGULANT (ELIQUIS) 5 MG tablet     "Other Relevant Orders    CBC with Platelets & Differential    Comprehensive metabolic panel    INR    Partial thromboplastin time    Acute deep vein thrombosis (DVT) of femoral vein of right lower extremity (H)    Relevant Medications    apixaban ANTICOAGULANT (ELIQUIS) 5 MG tablet    Other Relevant Orders    CBC with Platelets & Differential    Comprehensive metabolic panel    INR    Partial thromboplastin time        CC: Luciana Franco MD   ______________________________________________________________________________    History of Present Illness    Ms. Breanna Fischer presented herself today for follow-up acute VTE.  She is currently on Eliquis.  She does not have any intolerable side effects.  No bleeding.    Review of systems  Apart from describing in HPI, the remainder of comprehensive ROS was negative.    Past History    Past Medical History:   Diagnosis Date    Celiac disease     Hypertension        Past Surgical History:   Procedure Laterality Date    ABDOMEN SURGERY  1999    Gall bladder    CHOLECYSTECTOMY      ENT SURGERY      IR [facility] VENOUS THROMB  3/21/2024    IR PULMONARY ANGIOGRAM BILATERAL  3/20/2024       Physical Exam    BP (!) 146/73   Pulse 85   Temp 99.1  F (37.3  C)   Resp 18   Ht 1.676 m (5' 6\")   Wt 127.5 kg (281 lb)   SpO2 97%   BMI 45.35 kg/m      General: alert, awake, not in acute distress  HEENT: Head: Normal, normocephalic, atraumatic.  Eye: Normal external eye, conjunctiva, lids cornea, CARINA.  Nose: Normal external nose, mucus membranes and septum.  Pharynx: Normal buccal mucosa. Normal pharynx.  Neck / Thyroid: Supple, no masses, nodes, nodules or enlargement.  Lymphatics: No abnormally enlarged lymph nodes.  Chest: Normal chest wall and respirations. Clear to auscultation.  Heart: S1 S2 RRR, no murmur.   Abdomen: abdomen is soft without significant tenderness, masses, organomegaly or guarding  Extremities: normal strength, tone, and muscle mass  Skin: normal. no rash or " abnormalities  CNS: non focal.    Lab Results    No results found for this or any previous visit (from the past 240 hour(s)).      Imaging    No results found.    32 minutes spent on the date of the encounter doing chart review, history and exam, documentation, communication of the treatment plan with the care team and further activities as noted above.    Signed by: Arianna Zhu MD

## 2024-06-21 NOTE — PROGRESS NOTES
"Oncology Rooming Note    June 21, 2024 2:57 PM   Breanna Fischer is a 49 year old female who presents for:    Chief Complaint   Patient presents with    Hematology     Acute deep vein thrombosis (DVT) of femoral vein of right lower extremity (H)     Initial Vitals: BP (!) 146/73   Pulse 85   Temp 99.1  F (37.3  C)   Resp 18   Ht 1.676 m (5' 6\")   Wt 127.5 kg (281 lb)   SpO2 97%   BMI 45.35 kg/m   Estimated body mass index is 45.35 kg/m  as calculated from the following:    Height as of this encounter: 1.676 m (5' 6\").    Weight as of this encounter: 127.5 kg (281 lb). Body surface area is 2.44 meters squared.  No Pain (0) Comment: Data Unavailable   No LMP recorded. Patient is perimenopausal.  Allergies reviewed: Yes  Medications reviewed: Yes    Medications: Medication refills not needed today.  Pharmacy name entered into [employer]:    [id number] - [place], [place] - [address] [facility] [place], [place] - [address] [facility] - [place], [place] - [address] [facility]AC[facility] [place], [place] - [address] [facility]    Frailty Screening:   Is the patient here for a new oncology consult visit in cancer care? 2. No      Clinical concerns: None      Rosa M Zapata LPN             "

## 2024-06-21 NOTE — LETTER
"6/21/2024      Breanna Fischer  [address] [place] [address]      Dear Colleague,    Thank you for referring your patient, Breanna Fischer, to the [facility]. Please see a copy of my visit note below.    Oncology Rooming Note    June 21, 2024 2:57 PM   Breanna Fischer is a 49 year old female who presents for:    Chief Complaint   Patient presents with     Hematology     Acute deep vein thrombosis (DVT) of femoral vein of right lower extremity (H)     Initial Vitals: BP (!) 146/73   Pulse 85   Temp 99.1  F (37.3  C)   Resp 18   Ht 1.676 m (5' 6\")   Wt 127.5 kg (281 lb)   SpO2 97%   BMI 45.35 kg/m   Estimated body mass index is 45.35 kg/m  as calculated from the following:    Height as of this encounter: 1.676 m (5' 6\").    Weight as of this encounter: 127.5 kg (281 lb). Body surface area is 2.44 meters squared.  No Pain (0) Comment: Data Unavailable   No LMP recorded. Patient is perimenopausal.  Allergies reviewed: Yes  Medications reviewed: Yes    Medications: Medication refills not needed today.  Pharmacy name entered into [employer]:    [id number] - [place], [place] - [address] [place] [address] PHARMACY [place], [place] - [address] - [place], [place] - [address] [facility]AC[facility] [place], [place] - [address] [facility]    Frailty Screening:   Is the patient here for a new oncology consult visit in cancer care? 2. No      Clinical concerns: None      Rosa M Zapata LPN               [facility] Progress Note    Patient: Breanna Fischer  MRN: [id number]  Date of Service: Jun 21, 2024         Reason for Visit    Chief Complaint   Patient presents with     Hematology     Acute deep vein thrombosis (DVT) of femoral vein of right lower extremity (H)       Assessment and Plan     Cancer Staging   No matching staging information was found for the patient.      ECOG Performance    0 - " Independent     Pain  Pain Score: No Pain (0)    #.  History of acute saddle embolus status post mechanical thrombectomy in 3/2024  #.  History of acute occlusive DVT of the external iliac and common femoral vein on the right and popliteal vein extending into the posterior tibial and peroneal vein to mid/distal calf of the left lower extremity, status post mechanical thrombectomy of the right lower extremity in 3/2024  #.  Overweight  #.  Sedentary lifestyle     Today, I reviewed the thrombophilia workup.  She does not have factor V Leiden or prothrombin gene mutation.  Antiphospholipid panel was negative.  Antithrombin III level were normal.   Given the history of life-threatening nature of DVT, she is recommended extended (no stop date) anticoagulation therapy.  She tolerates Eliquis fairly well at this point.  She agreed with the plan.  I advised her to watch for signs and symptoms of bleeding.   I also recommended her to update on age-appropriate cancer screening.   Recommended annual follow-up for long-term use of anticoagulation therapy.    The longitudinal plan of care for the diagnosis(es)/condition(s) as documented were addressed during this visit. Due to the added complexity in care, I will continue to support Breanna in the subsequent management and with ongoing continuity of care.    Encounter Diagnoses:    Problem List Items Addressed This Visit       Acute saddle pulmonary embolism with acute cor pulmonale (H)    Relevant Medications    apixaban ANTICOAGULANT (ELIQUIS) 5 MG tablet    Other Relevant Orders    CBC with Platelets & Differential    Comprehensive metabolic panel    INR    Partial thromboplastin time    Acute deep vein thrombosis (DVT) of femoral vein of right lower extremity (H)    Relevant Medications    apixaban ANTICOAGULANT (ELIQUIS) 5 MG tablet    Other Relevant Orders    CBC with Platelets & Differential    Comprehensive metabolic panel    INR    Partial thromboplastin time     "    CC: Luciana Franco MD   ______________________________________________________________________________    History of Present Illness    Ms. Breanna Fischer presented herself today for follow-up acute VTE.  She is currently on Eliquis.  She does not have any intolerable side effects.  No bleeding.    Review of systems  Apart from describing in HPI, the remainder of comprehensive ROS was negative.    Past History    Past Medical History:   Diagnosis Date     Celiac disease      Hypertension        Past Surgical History:   Procedure Laterality Date     ABDOMEN SURGERY  1999    Gall bladder     CHOLECYSTECTOMY       ENT SURGERY       IR [facility] VENOUS THROMB  3/21/2024     IR PULMONARY ANGIOGRAM BILATERAL  3/20/2024       Physical Exam    BP (!) 146/73   Pulse 85   Temp 99.1  F (37.3  C)   Resp 18   Ht 1.676 m (5' 6\")   Wt 127.5 kg (281 lb)   SpO2 97%   BMI 45.35 kg/m      General: alert, awake, not in acute distress  HEENT: Head: Normal, normocephalic, atraumatic.  Eye: Normal external eye, conjunctiva, lids cornea, CARINA.  Nose: Normal external nose, mucus membranes and septum.  Pharynx: Normal buccal mucosa. Normal pharynx.  Neck / Thyroid: Supple, no masses, nodes, nodules or enlargement.  Lymphatics: No abnormally enlarged lymph nodes.  Chest: Normal chest wall and respirations. Clear to auscultation.  Heart: S1 S2 RRR, no murmur.   Abdomen: abdomen is soft without significant tenderness, masses, organomegaly or guarding  Extremities: normal strength, tone, and muscle mass  Skin: normal. no rash or abnormalities  CNS: non focal.    Lab Results    No results found for this or any previous visit (from the past 240 hour(s)).      Imaging    No results found.    32 minutes spent on the date of the encounter doing chart review, history and exam, documentation, communication of the treatment plan with the care team and further activities as noted above.    Signed by: Arianna Zhu MD      Again, thank you for " allowing me to participate in the care of your patient.        Sincerely,        Arianna Zhu MD

## 2024-07-04 SDOH — HEALTH STABILITY: PHYSICAL HEALTH: ON AVERAGE, HOW MANY MINUTES DO YOU ENGAGE IN EXERCISE AT THIS LEVEL?: 30 MIN

## 2024-07-04 SDOH — HEALTH STABILITY: PHYSICAL HEALTH: ON AVERAGE, HOW MANY DAYS PER WEEK DO YOU ENGAGE IN MODERATE TO STRENUOUS EXERCISE (LIKE A BRISK WALK)?: 6 DAYS

## 2024-07-04 ASSESSMENT — SOCIAL DETERMINANTS OF HEALTH (SDOH): HOW OFTEN DO YOU GET TOGETHER WITH FRIENDS OR RELATIVES?: TWICE A WEEK

## 2024-07-09 ENCOUNTER — OFFICE VISIT (OUTPATIENT)
Dept: FAMILY MEDICINE | Facility: CLINIC | Age: 49
End: 2024-07-09
Payer: COMMERCIAL

## 2024-07-09 VITALS
TEMPERATURE: 97.2 F | HEART RATE: 74 BPM | SYSTOLIC BLOOD PRESSURE: 128 MMHG | HEIGHT: 68 IN | RESPIRATION RATE: 16 BRPM | BODY MASS INDEX: 42.03 KG/M2 | WEIGHT: 277.3 LBS | OXYGEN SATURATION: 99 % | DIASTOLIC BLOOD PRESSURE: 87 MMHG

## 2024-07-09 DIAGNOSIS — I26.02 CHRONIC SADDLE PULMONARY EMBOLISM WITH ACUTE COR PULMONALE (H): ICD-10-CM

## 2024-07-09 DIAGNOSIS — Z12.11 SCREEN FOR COLON CANCER: ICD-10-CM

## 2024-07-09 DIAGNOSIS — Z00.00 ROUTINE GENERAL MEDICAL EXAMINATION AT A HEALTH CARE FACILITY: Primary | ICD-10-CM

## 2024-07-09 DIAGNOSIS — Z23 NEED FOR VACCINATION: ICD-10-CM

## 2024-07-09 DIAGNOSIS — K90.0 CELIAC DISEASE: ICD-10-CM

## 2024-07-09 DIAGNOSIS — R73.03 PREDIABETES: ICD-10-CM

## 2024-07-09 DIAGNOSIS — Z13.220 ENCOUNTER FOR SCREENING FOR LIPID DISORDER: ICD-10-CM

## 2024-07-09 DIAGNOSIS — I10 BENIGN ESSENTIAL HYPERTENSION: ICD-10-CM

## 2024-07-09 DIAGNOSIS — I27.82 CHRONIC SADDLE PULMONARY EMBOLISM WITH ACUTE COR PULMONALE (H): ICD-10-CM

## 2024-07-09 DIAGNOSIS — R60.0 BILATERAL EDEMA OF LOWER EXTREMITY: ICD-10-CM

## 2024-07-09 LAB
CHOLEST SERPL-MCNC: 142 MG/DL
FASTING STATUS PATIENT QL REPORTED: ABNORMAL
HBA1C MFR BLD: 6 % (ref 0–5.6)
HDLC SERPL-MCNC: 39 MG/DL
LDLC SERPL CALC-MCNC: 89 MG/DL
NONHDLC SERPL-MCNC: 103 MG/DL
TRIGL SERPL-MCNC: 71 MG/DL

## 2024-07-09 PROCEDURE — 90471 IMMUNIZATION ADMIN: CPT | Performed by: INTERNAL MEDICINE

## 2024-07-09 PROCEDURE — 80061 LIPID PANEL: CPT | Performed by: INTERNAL MEDICINE

## 2024-07-09 PROCEDURE — 90677 PCV20 VACCINE IM: CPT | Performed by: INTERNAL MEDICINE

## 2024-07-09 PROCEDURE — 99396 PREV VISIT EST AGE 40-64: CPT | Mod: 25 | Performed by: INTERNAL MEDICINE

## 2024-07-09 PROCEDURE — 36415 COLL VENOUS BLD VENIPUNCTURE: CPT | Performed by: INTERNAL MEDICINE

## 2024-07-09 PROCEDURE — 83036 HEMOGLOBIN GLYCOSYLATED A1C: CPT | Performed by: INTERNAL MEDICINE

## 2024-07-09 PROCEDURE — 99214 OFFICE O/P EST MOD 30 MIN: CPT | Mod: 25 | Performed by: INTERNAL MEDICINE

## 2024-07-09 RX ORDER — LOSARTAN POTASSIUM 25 MG/1
25 TABLET ORAL DAILY
Qty: 90 TABLET | Refills: 2 | Status: SHIPPED | OUTPATIENT
Start: 2024-07-09

## 2024-07-09 ASSESSMENT — PAIN SCALES - GENERAL: PAINLEVEL: NO PAIN (0)

## 2024-07-09 NOTE — PROGRESS NOTES
Preventive Care Visit  [facility] ANGELA Franco MD, Internal Medicine  Jul 9, 2024        Assessment and Plan  1. Routine general medical examination at a health care facility    Last seen patient in March 2024 for hospital follow-up visit of acute saddle pulmonary embolism on oral anticoagulation, patient supposed to follow hematology whom she has seen recently on June 21, 2024 and she recommended extended course of anticoagulation with Eliquis.      Patient is here for annual physical, on the lab work done in March 2024 showing normal BMP, IFG, normal vitamin D and B12, normal CBC.  Can recheck lipid panel, A1c    - Pneumococcal 20 Valent Conjugate (Prevnar 20)  - Colonoscopy Screening  Referral; Future  - Lipid panel reflex to direct LDL Non-fasting; Future  - REVIEW OF HEALTH MAINTENANCE PROTOCOL ORDERS  - losartan (COZAAR) 25 MG tablet; Take 1 tablet (25 mg) by mouth daily  Dispense: 90 tablet; Refill: 2  - Lymphedema Therapy  Referral; Future  - TD,PF 7+(TENIVAC)  - Hemoglobin A1c; Future  - PRIMARY CARE FOLLOW-UP SCHEDULING; Future    2. Chronic saddle pulmonary embolism with acute cor pulmonale (H)  Chronic stable , no new signs of CHF and recent Oncology visit on 6/2024 for duration of OAC depicting that pt will need to be on the medication for long duration / indefinite    3. Bilateral edema of lower extremity  Ongoing problem, remaining uncontrolled inspite of current compression stockings though pt states that she is doing much better.  Given the patient's morbid obesity shared decision to get this lymphedema therapy started off so that she can feel herself better on mobility and exercise tolerance.  Patient understood and agreed the plan.  - Lymphedema Therapy  Referral; Future    4. Benign essential hypertension  - losartan (COZAAR) 25 MG tablet; Take 1 tablet (25 mg) by mouth daily  Dispense: 90 tablet; Refill: 2    5. Celiac disease  Chronic  problem, stable.  Follows gastroenterology, has upcoming colonoscopy in August 2024.    6. Prediabetes  - Hemoglobin A1c; Future    7. Encounter for screening for lipid disorder  - Lipid panel reflex to direct LDL Non-fasting; Future    8. Screen for colon cancer  - Colonoscopy Screening  Referral; Future    9. Need for vaccination  - Pneumococcal 20 Valent Conjugate (Prevnar 20)  - TD,PF 7+(TENIVAC)         Please note that this note consists of symbols derived from keyboarding, dictation and/or voice recognition software. As a result, there may be errors in the script that have gone undetected. Please consider this when interpreting information found in this chart.    Patient Instructions   As discussed, please do fasting labs placed    Refills will be taken care.       =====================================    Patient Education  Preventive Care Advice   This is general advice given by our system to help you stay healthy. However, your care team may have specific advice just for you. Please talk to your care team about your preventive care needs.  Nutrition  Eat 5 or more servings of fruits and vegetables each day.  Try wheat bread, brown rice and whole grain pasta (instead of white bread, rice, and pasta).  Get enough calcium and vitamin D. Check the label on foods and aim for 100% of the RDA (recommended daily allowance).  Lifestyle  Exercise at least 150 minutes each week  (30 minutes a day, 5 days a week).  Do muscle strengthening activities 2 days a week. These help control your weight and prevent disease.  No smoking.  Wear sunscreen to prevent skin cancer.  Have a dental exam and cleaning every 6 months.  Yearly exams  See your health care team every year to talk about:  Any changes in your health.  Any medicines your care team has prescribed.  Preventive care, family planning, and ways to prevent chronic diseases.  Shots (vaccines)   HPV shots (up to age 26), if you've never had them  before.  Hepatitis B shots (up to age 59), if you've never had them before.  COVID-19 shot: Get this shot when it's due.  Flu shot: Get a flu shot every year.  Tetanus shot: Get a tetanus shot every 10 years.  Pneumococcal, hepatitis A, and RSV shots: Ask your care team if you need these based on your risk.  Shingles shot (for age 50 and up)  General health tests  Diabetes screening:  Starting at age 35, Get screened for diabetes at least every 3 years.  If you are younger than age 35, ask your care team if you should be screened for diabetes.  Cholesterol test: At age 39, start having a cholesterol test every 5 years, or more often if advised.  Bone density scan (DEXA): At age 50, ask your care team if you should have this scan for osteoporosis (brittle bones).  Hepatitis C: Get tested at least once in your life.  STIs (sexually transmitted infections)  Before age 24: Ask your care team if you should be screened for STIs.  After age 24: Get screened for STIs if you're at risk. You are at risk for STIs (including HIV) if:  You are sexually active with more than one person.  You don't use condoms every time.  You or a partner was diagnosed with a sexually transmitted infection.  If you are at risk for HIV, ask about PrEP medicine to prevent HIV.  Get tested for HIV at least once in your life, whether you are at risk for HIV or not.  Cancer screening tests  Cervical cancer screening: If you have a cervix, begin getting regular cervical cancer screening tests starting at age 21.  Breast cancer scan (mammogram): If you've ever had breasts, begin having regular mammograms starting at age 40. This is a scan to check for breast cancer.  Colon cancer screening: It is important to start screening for colon cancer at age 45.  Have a colonoscopy test every 10 years (or more often if you're at risk) Or, ask your provider about stool tests like a FIT test every year or Cologuard test every 3 years.  To learn more about your  testing options, visit:   .  For help making a decision, visit:   https://bit.ly/kx93714.  Prostate cancer screening test: If you have a prostate, ask your care team if a prostate cancer screening test (PSA) at age 55 is right for you.  Lung cancer screening: If you are a current or former smoker ages 50 to 80, ask your care team if ongoing lung cancer screenings are right for you.  For informational purposes only. Not to replace the advice of your health care provider. Copyright   2023 [facility]. All rights reserved. Clinically reviewed by the [facility] Transitions Program. [employer] 266752 - REV 01/24.     Return in about 1 year (around 7/9/2025), or if symptoms worsen or fail to improve, for Follow up of last visit, If symptoms persist, Preventative Visit.    Luciana Franco MD  [facility] ANGELA Carlos is a 49 year old, presenting for the following:  Physical        7/9/2024     8:47 AM   Additional Questions   Roomed by Marcella BAKER        Via the Health Maintenance questionnaire, the patient has reported the following services have been completed -Mammogram: [facility] 2022-08-01, this information has not been sent to the abstraction team.  Health Care Directive  Patient does not have a Health Care Directive or Living Will: Patient states has Advance Directive and will bring in a copy to clinic.    HPI    Would like to talk about Colonoscopy.   HF action plan- wants to know if this a diagnosis or if it was part of her PE.  Would like to talk about ligaments and tendons of her knees.        Medication Followup of Losartan  Taking Medication as prescribed: yes  Side Effects:  None  Medication Helping Symptoms:  yes    Pt would like a year refill of possible.         7/4/2024   General Health   How would you rate your overall physical health? Good   Feel stress (tense, anxious, or unable to sleep) Only a little      (!) STRESS CONCERN      7/4/2024    Nutrition   Three or more servings of calcium each day? Yes   Diet: Gluten-free/reduced   How many servings of fruit and vegetables per day? (!) 2-3   How many sweetened beverages each day? 0-1            7/4/2024   Exercise   Days per week of moderate/strenous exercise 6 days   Average minutes spent exercising at this level 30 min            7/4/2024   Social Factors   Frequency of gathering with friends or relatives Twice a week   Worry food won't last until get money to buy more No   Food not last or not have enough money for food? No   Do you have housing? (Housing is defined as stable permanent housing and does not include staying ouside in a car, in a tent, in an abandoned building, in an overnight shelter, or couch-surfing.) Yes   Are you worried about losing your housing? No   Lack of transportation? No   Unable to get utilities (heat,electricity)? No            7/4/2024   Fall Risk   Fallen 2 or more times in the past year? No   Trouble with walking or balance? No             7/4/2024   Dental   Dentist two times every year? Yes            5/8/2024   TB Screening   Were you born outside of the [place]? No              Today's PHQ-2 Score:       3/28/2024     8:19 AM   PHQ-2 ( 1999 Pfizer)   Q1: Little interest or pleasure in doing things 0   Q2: Feeling down, depressed or hopeless 0   PHQ-2 Score 0   Q1: Little interest or pleasure in doing things Not at all   Q2: Feeling down, depressed or hopeless Not at all   PHQ-2 Score 0         7/4/2024   Substance Use   Alcohol more than 3/day or more than 7/wk No   Do you use any other substances recreationally? No        Social History     Tobacco Use    Smoking status: Never    Smokeless tobacco: Never   Vaping Use    Vaping status: Never Used   Substance Use Topics    Alcohol use: No    Drug use: No           5/8/2024   LAST FHS-7 RESULTS   1st degree relative breast or ovarian cancer No   Any relative bilateral breast cancer No   Any male have breast cancer No    Any ONE woman have BOTH breast AND ovarian cancer No   Any woman with breast cancer before 50yrs No   2 or more relatives with breast AND/OR ovarian cancer No   2 or more relatives with breast AND/OR bowel cancer No           Mammogram Screening - Annual screen due to greater than 20% lifetime risk as estimated by Breast Cancer Risk Calculator        2024   STI Screening   New sexual partner(s) since last STI/HIV test? No        History of abnormal Pap smear: No - age 30-64 HPV with reflex Pap every 5 years recommended        Latest Ref Rng & Units 2022     9:21 AM   PAP / HPV   PAP  Negative for Intraepithelial Lesion or Malignancy (NILM)    HPV 16 DNA Negative Negative    HPV 18 DNA Negative Negative    Other HR HPV Negative Negative      ASCVD Risk   The 10-year ASCVD risk score (Alfa CARLOS, et al., 2019) is: 1.9%    Values used to calculate the score:      Age: 49 years      Sex: Female      Is Non- : No      Diabetic: No      Tobacco smoker: No      Systolic Blood Pressure: 128 mmHg      Is BP treated: Yes      HDL Cholesterol: 40 mg/dL      Total Cholesterol: 151 mg/dL    Reviewed and updated as needed this visit by Provider   Tobacco  Allergies  Meds  Problems  Med Hx  Surg Hx  Fam Hx            Past Medical History:   Diagnosis Date    Celiac disease     Hypertension      Past Surgical History:   Procedure Laterality Date    ABDOMEN SURGERY      Gall bladder    CHOLECYSTECTOMY      ENT SURGERY      IR [facility] VENOUS THROMB  3/21/2024    IR PULMONARY ANGIOGRAM BILATERAL  3/20/2024     OB History    Para Term  AB Living   3 0 0 0 0 3   SAB IAB Ectopic Multiple Live Births   0 0 0 0 0      # Outcome Date GA Lbr Yvon/2nd Weight Sex Type Anes PTL Lv   3             2             1               Lab work is in process  Labs reviewed in EPIC  BP Readings from Last 3 Encounters:   24 128/87   24 (!) 146/73   24 (!)  144/73    Wt Readings from Last 3 Encounters:   07/09/24 125.8 kg (277 lb 4.8 oz)   06/21/24 127.5 kg (281 lb)   04/03/24 127 kg (280 lb)                  Patient Active Problem List   Diagnosis    Morbid obesity (H)    Prediabetes    Low vitamin D level    Benign essential hypertension    Celiac disease    Peripheral edema    Chronic saddle pulmonary embolism with acute cor pulmonale (H)    Acute deep vein thrombosis (DVT) of femoral vein of right lower extremity (H)     Past Surgical History:   Procedure Laterality Date    ABDOMEN SURGERY  1999    Gall bladder    CHOLECYSTECTOMY      ENT SURGERY      IR [facility] VENOUS THROMB  3/21/2024    IR PULMONARY ANGIOGRAM BILATERAL  3/20/2024       Social History     Tobacco Use    Smoking status: Never    Smokeless tobacco: Never   Substance Use Topics    Alcohol use: No     Family History   Problem Relation Age of Onset    Obesity Mother     Thyroid Disease Mother     Diabetes Mother     Hypertension Mother     Thyroid Disease Father     Diabetes Father     Hypertension Father     Obesity Father     Breast Cancer Paternal Grandmother     Obesity Sister     Thyroid Disease Sister     Cerebrovascular Disease Sister     Other Cancer Sister     Asthma Sister     Melanoma No family hx of     Skin Cancer No family hx of          Current Outpatient Medications   Medication Sig Dispense Refill    apixaban ANTICOAGULANT (ELIQUIS) 5 MG tablet Take 1 tablet (5 mg) by mouth 2 times daily 180 tablet 3    cetirizine (ZYRTEC) 5 MG tablet Take 5-10 mg by mouth daily as needed for allergies      losartan (COZAAR) 25 MG tablet Take 1 tablet (25 mg) by mouth daily 90 tablet 2    tiZANidine (ZANAFLEX) 4 MG tablet Take 1 tablet (4 mg) by mouth nightly as needed for muscle spasms 30 tablet 1    gabapentin (NEURONTIN) 100 MG capsule Take 1 capsule (100 mg) by mouth daily as needed for other (migraine) (Patient not taking: Reported on 7/9/2024) 30 capsule 0     Allergies   Allergen Reactions     "Lisinopril Cough    Tea Tree Oil Rash     Recent Labs   Lab Test 03/28/24  0906 03/22/24  0428 03/21/24  0634 03/20/24  1918 03/20/24  1847 07/11/23  0916 06/01/23  0836 07/01/22  1053 07/01/22  1053 02/08/22  0951 02/08/22  0951 06/05/20  0918 07/26/19  0943   A1C  --   --   --   --  6.1*  --  5.9*  --  5.8*   < > 5.9* 6.1* 6.5*   LDL  --   --   --   --   --  89  --   --   --   --  81 88 102*   HDL  --   --   --   --   --  40*  --   --   --   --  38* 36* 42*   TRIG  --   --   --   --   --  109  --   --   --   --  103 86 88   ALT  --   --  30  --   --  16  --   --  20   < > 27  --  24   CR 0.69 0.65 0.58   < > 0.70 0.56  --   --  0.50*   < > 0.57 0.55 0.62   GFRESTIMATED >90 >90 >90   < > >90 >90  --   --  >90   < > >90 >90 >90   GFRESTBLACK  --   --   --   --   --   --   --   --   --   --   --  >90 >90   POTASSIUM 4.3 4.0 4.0  --  4.0 4.3  --    < > 4.5   < > 4.1 3.7 3.5   TSH  --   --   --   --  1.91 1.37  --   --   --   --  1.29  --  0.90    < > = values in this interval not displayed.          Review of Systems  Constitutional, HEENT, cardiovascular, pulmonary, GI, , musculoskeletal, neuro, skin, endocrine and psych systems are negative, except as otherwise noted.     Objective    Exam  /87 (BP Location: Left arm, Patient Position: Sitting, Cuff Size: Adult Large)   Pulse 74   Temp 97.2  F (36.2  C) (Tympanic)   Resp 16   Ht 1.715 m (5' 7.52\")   Wt 125.8 kg (277 lb 4.8 oz)   LMP 04/20/2024 (Approximate)   SpO2 99%   BMI 42.76 kg/m     Estimated body mass index is 42.76 kg/m  as calculated from the following:    Height as of this encounter: 1.715 m (5' 7.52\").    Weight as of this encounter: 125.8 kg (277 lb 4.8 oz).    Physical Exam  GENERAL: alert and no distress  EYES: Eyes grossly normal to inspection, PERRL and conjunctivae and sclerae normal  HENT: ear canals and TM's normal, nose and mouth without ulcers or lesions  NECK: no adenopathy, no asymmetry, masses, or scars  RESP: lungs clear to " auscultation - no rales, rhonchi or wheezes  BREAST: Deferred , Mammogram  CV: regular rate and rhythm, normal S1 S2, no S3 or S4, no murmur, click or rub, no peripheral edema  ABDOMEN: soft, nontender, no hepatosplenomegaly, no masses and bowel sounds normal  MS: no gross musculoskeletal defects noted, + non pitting edema  SKIN: no suspicious lesions or rashes  NEURO: Normal strength and tone, mentation intact and speech normal  PSYCH: mentation appears normal, affect normal/bright        Signed Electronically by: Luciana Franco MD

## 2024-07-09 NOTE — PATIENT INSTRUCTIONS
As discussed, please do fasting labs placed    Refills will be taken care.       =====================================    Patient Education   Preventive Care Advice   This is general advice given by our system to help you stay healthy. However, your care team may have specific advice just for you. Please talk to your care team about your preventive care needs.  Nutrition  Eat 5 or more servings of fruits and vegetables each day.  Try wheat bread, brown rice and whole grain pasta (instead of white bread, rice, and pasta).  Get enough calcium and vitamin D. Check the label on foods and aim for 100% of the RDA (recommended daily allowance).  Lifestyle  Exercise at least 150 minutes each week  (30 minutes a day, 5 days a week).  Do muscle strengthening activities 2 days a week. These help control your weight and prevent disease.  No smoking.  Wear sunscreen to prevent skin cancer.  Have a dental exam and cleaning every 6 months.  Yearly exams  See your health care team every year to talk about:  Any changes in your health.  Any medicines your care team has prescribed.  Preventive care, family planning, and ways to prevent chronic diseases.  Shots (vaccines)   HPV shots (up to age 26), if you've never had them before.  Hepatitis B shots (up to age 59), if you've never had them before.  COVID-19 shot: Get this shot when it's due.  Flu shot: Get a flu shot every year.  Tetanus shot: Get a tetanus shot every 10 years.  Pneumococcal, hepatitis A, and RSV shots: Ask your care team if you need these based on your risk.  Shingles shot (for age 50 and up)  General health tests  Diabetes screening:  Starting at age 35, Get screened for diabetes at least every 3 years.  If you are younger than age 35, ask your care team if you should be screened for diabetes.  Cholesterol test: At age 39, start having a cholesterol test every 5 years, or more often if advised.  Bone density scan (DEXA): At age 50, ask your care team if you should  have this scan for osteoporosis (brittle bones).  Hepatitis C: Get tested at least once in your life.  STIs (sexually transmitted infections)  Before age 24: Ask your care team if you should be screened for STIs.  After age 24: Get screened for STIs if you're at risk. You are at risk for STIs (including HIV) if:  You are sexually active with more than one person.  You don't use condoms every time.  You or a partner was diagnosed with a sexually transmitted infection.  If you are at risk for HIV, ask about PrEP medicine to prevent HIV.  Get tested for HIV at least once in your life, whether you are at risk for HIV or not.  Cancer screening tests  Cervical cancer screening: If you have a cervix, begin getting regular cervical cancer screening tests starting at age 21.  Breast cancer scan (mammogram): If you've ever had breasts, begin having regular mammograms starting at age 40. This is a scan to check for breast cancer.  Colon cancer screening: It is important to start screening for colon cancer at age 45.  Have a colonoscopy test every 10 years (or more often if you're at risk) Or, ask your provider about stool tests like a FIT test every year or Cologuard test every 3 years.  To learn more about your testing options, visit:   .  For help making a decision, visit:   https://bit.ly/dp16483.  Prostate cancer screening test: If you have a prostate, ask your care team if a prostate cancer screening test (PSA) at age 55 is right for you.  Lung cancer screening: If you are a current or former smoker ages 50 to 80, ask your care team if ongoing lung cancer screenings are right for you.  For informational purposes only. Not to replace the advice of your health care provider. Copyright   2023 [employer]. All rights reserved. Clinically reviewed by the [facility] Transitions Program. [employer] [id number] - REV 01/24.

## 2024-07-11 NOTE — RESULT ENCOUNTER NOTE
Your good cholesterol levels are low, please follow the diet below for improvement    Your lab work is POSITIVE for Prediabetes which is causing your symptoms of fatigue. Please follow the diet below for improvement. Will need follow up on this.     Thank you  Luciana Franco MD on 7/11/2024

## 2024-07-12 ENCOUNTER — TELEPHONE (OUTPATIENT)
Dept: ONCOLOGY | Facility: HOSPITAL | Age: 49
End: 2024-07-12
Payer: COMMERCIAL

## 2024-07-12 DIAGNOSIS — M79.602 PAIN IN BOTH UPPER EXTREMITIES: Primary | ICD-10-CM

## 2024-07-12 DIAGNOSIS — M79.601 PAIN IN BOTH UPPER EXTREMITIES: Primary | ICD-10-CM

## 2024-07-12 NOTE — TELEPHONE ENCOUNTER
Breanna called, identified herself using name and . She said she had an appt in March after having been found to have a PE and DVT, however, the symptoms of the DVT were in the opposite leg than the DVT was found. She is now having symptoms again, back of knee right leg. Says her leg feels tight but no swelling. She has been wearing compression socks since March when diagnosed. She remains on Eloquis for lifetime because the reason for the clots were never found. She feels it may have been chalked up to her being overweight. ST also told her that she had a 2% chance of another DVT on thinners. I told her that I could not tell her that she doesn't have another clot so I will need to get with ST and call her back.     I have called Breanna back and explained the response from Dr Zhu which is that Dr. Zhu is ordering joao U/S of her legs since she had clots in both. That will est a baseline going forward since she has been on Eloquis for about 4 mos. She asked if this means she doesn't have to go to the ED and I responded, correct. I told her she could get it done and if there is any concern, they won't let her leave so she would have her answers right then and lay all her fears to rest. She accepted the answer. I gave her the number to central scheduling. She thanked me for my help and all the support. YVETTE Rodriguez RN, OCN, CBCN

## 2024-07-13 ENCOUNTER — HOSPITAL ENCOUNTER (OUTPATIENT)
Dept: ULTRASOUND IMAGING | Facility: HOSPITAL | Age: 49
Discharge: HOME | End: 2024-07-13
Attending: INTERNAL MEDICINE | Admitting: INTERNAL MEDICINE
Payer: COMMERCIAL

## 2024-07-13 DIAGNOSIS — M79.605 PAIN IN BOTH LOWER EXTREMITIES: ICD-10-CM

## 2024-07-13 DIAGNOSIS — M79.604 PAIN IN BOTH LOWER EXTREMITIES: ICD-10-CM

## 2024-07-13 PROCEDURE — 93970 EXTREMITY STUDY: CPT

## 2024-07-17 ENCOUNTER — PATIENT OUTREACH (OUTPATIENT)
Dept: ONCOLOGY | Facility: HOSPITAL | Age: 49
End: 2024-07-17
Payer: COMMERCIAL

## 2024-07-17 NOTE — PROGRESS NOTES
Thank call our nurse triage line from VIVIANA Amado with [facility] digestive [facility].  Ingris requesting 2-day hold of Eliquis prior to Breanna's colonoscopy.  Colonoscopy is scheduled August 16, 2024 and hold of Eliquis should start August 14, 2024.  Bridging as needed.  If less than a 2-day hold is required they will need most recent creatinine level and date it was drawn.  Request sent to Paige Garner CNP as Dr. Zhu is out of clinic at this time.    Per Paige Garner CNP:    Paige Garner, APRN Pia Cavazos RN  2 day hold is sufficient. Hold day prior and day of procedure. Can resume day after per [facility], if no bleeding.    Call to [facility] to give verbal order to VIVIANA Browning. Nallely verified order with verbal readback.    Pia Gore RN  07/18/24  8:17 AM

## 2024-08-03 ENCOUNTER — HEALTH MAINTENANCE LETTER (OUTPATIENT)
Age: 49
End: 2024-08-03

## 2024-08-16 ENCOUNTER — TRANSFERRED RECORDS (OUTPATIENT)
Dept: HEALTH INFORMATION MANAGEMENT | Facility: CLINIC | Age: 49
End: 2024-08-16
Payer: COMMERCIAL

## 2024-09-05 ASSESSMENT — MIGRAINE DISABILITY ASSESSMENT (MIDAS)
HOW OFTEN WERE SOCIAL ACTIVITIES MISSED DUE TO HEADACHES: 0
HOW MANY DAYS DID YOU MISS WORK OR SCHOOL BECAUSE OF HEADACHES: 0
TOTAL SCORE: 0
HOW MANY DAYS IN THE PAST 3 MONTHS HAVE YOU HAD A HEADACHE: 1
HOW MANY DAYS WAS HOUSEWORK PRODUCTIVITY CUT IN HALF DUE TO HEADACHES: 0
HOW MANY DAYS WAS YOUR PRODUCTIVITY CUT IN HALF BECAUSE OF HEADACHES: 0
ON A SCALE FROM 0-10 ON AVERAGE HOW PAINFUL WERE HEADACHES: 2
HOW MANY DAYS DID YOU NOT DO HOUSEWORK BECAUSE OF HEADACHES: 0

## 2024-09-05 ASSESSMENT — HEADACHE IMPACT TEST (HIT 6)
HOW OFTEN HAVE YOU FELT FED UP OR IRRITATED BECAUSE OF YOUR HEADACHES: NEVER
HIT6 TOTAL SCORE: 44
HOW OFTEN DO HEADACHES LIMIT YOUR DAILY ACTIVITIES: RARELY
WHEN YOU HAVE HEADACHES HOW OFTEN IS THE PAIN SEVERE: RARELY
HOW OFTEN DID HEADACHS LIMIT CONCENTRATION ON WORK OR DAILY ACTIVITY: NEVER
HOW OFTEN HAVE YOU FELT TOO TIRED TO WORK BECAUSE OF YOUR HEADACHES: NEVER
WHEN YOU HAVE A HEADACHE HOW OFTEN DO YOU WISH YOU COULD LIE DOWN: SOMETIMES

## 2024-09-05 NOTE — PROGRESS NOTES
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  She reports excessive fatigue, bladder symptoms and in the past 1 episode of right arm numbness that raise the possibility of demyelinating disease.  I have recommended:    1.  MRI brain, cervical and thoracic spine with and without contrast to rule out demyelinating disease  2.  Lab work to include angiotensin-converting enzyme, DANTE, copper, folate, MMA, RPR, B1 and vitamin E  3.  Patient is claustrophobic and will require Ativan before the MRI scan  4.  Follow-up after above tests    Thank you again for this referral, please feel free to contact me if you have any questions.    Preston Chris MD  [facility]     REASON FOR CONSULTATION Headache        HISTORY OF PRESENT ILLNESS     We have been requested by Dr. 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  She was in her usual state of health until 2024 when she started having retro-orbital discomfort.  This was associated with some visual symptoms and also had some occipital pain.  She ended up going to the ER and received Reglan and Flexeril.  She had a CT of the head that showed hypoattenuation in white matter and that she was told to  follow-up with neurology.  She does report excessive fatigue at time and also has urinary frequency.  In the past she had 1 episode of right arm numbness that resolved spontaneously.  She does have some balance issues.  There is no history of any focal weakness, optic neuritis.     PROBLEM LIST   Patient Active Problem List   Diagnosis    Morbid obesity (H)    Prediabetes    Low vitamin D level    Benign essential hypertension    Celiac disease    Peripheral edema    Chronic saddle pulmonary embolism with acute cor pulmonale (H)    Acute deep vein thrombosis (DVT) of femoral vein of right lower extremity (H)    Benign neoplasm of transverse colon         PAST MEDICAL & SURGICAL HISTORY     Past Medical History:   Patient  has a past medical history of Celiac disease and Hypertension.    Surgical History:  She  has a past surgical history that includes Cholecystectomy; Abdomen surgery (1999); ENT surgery; IR Pulmonary Angiogram Bilateral (3/20/2024); and IR [facility] Venous Thromb (3/21/2024).     SOCIAL HISTORY     Reviewed, and she  reports that she has never smoked. She has never used smokeless tobacco. She reports that she does not drink alcohol and does not use drugs.     FAMILY HISTORY     Reviewed, and family history includes Asthma in her sister; Breast Cancer in her paternal grandmother; Cerebrovascular Disease in her sister; Diabetes in her father and mother; Hypertension in her father and mother; Obesity in her father, mother, and sister; Other Cancer in her sister; Thyroid Disease in her father, mother, and sister.     ALLERGIES     Allergies   Allergen Reactions    Lisinopril Cough    Tea Tree Oil Rash         REVIEW OF SYSTEMS     A 12 point review of system was performed and was negative except as outlined in the history of present illness.     HOME MEDICATIONS     Current Outpatient Rx   Medication Sig Dispense Refill    apixaban ANTICOAGULANT (ELIQUIS) 5 MG tablet Take 1 tablet (5 mg) by mouth 2 times  daily 180 tablet 3    cetirizine (ZYRTEC) 5 MG tablet Take 5-10 mg by mouth daily as needed for allergies      LORazepam (ATIVAN) 1 MG tablet Take 1 tablet 30 minutes before MRI scan 1 tablet 0    losartan (COZAAR) 25 MG tablet Take 1 tablet (25 mg) by mouth daily 90 tablet 2    tiZANidine (ZANAFLEX) 4 MG tablet Take 1 tablet (4 mg) by mouth nightly as needed for muscle spasms 30 tablet 1         PHYSICAL EXAM     Vital signs  /81   Pulse 70   Wt 127.1 kg (280 lb 3.2 oz)   LMP 04/20/2024 (Approximate)   BMI 43.21 kg/m      Weight:   280 lbs 3.2 oz    Patient is alert and oriented x4 in no acute distress. Vital signs were reviewed and are documented in electronic medical record. Neck was supple, no carotid bruits, thyromegaly, JVD, or lymphadenopathy was noted.   NEUROLOGY EXAM:  Speech mentation affect was normal.  Cranial nerves II through XII are intact no ophthalmoplegia.  Normal mass and tone with 5/5 strength.  Reflexes 2+ toes downgoing.  No dysmetria noted on finger-nose testing gait normal she is can tandem walk.     PERTINENT DIAGNOSTIC STUDIES     Following studies were reviewed:     CT BRAIN 4/3/2024  1.  Nonspecific hypoattenuation throughout the cerebral white matter which could represent advanced for age small vessel ischemic disease; suggest correlation with history. Demyelinating process could conceivably have a similar appearance.  2.  No acute intracranial hemorrhage.       PERTINENT LABS  Following labs were reviewed:  Office Visit on 07/09/2024   Component Date Value Ref Range Status    Cholesterol 07/09/2024 142  <200 mg/dL Final    Triglycerides 07/09/2024 71  <150 mg/dL Final    Direct Measure HDL 07/09/2024 39 (L)  >=50 mg/dL Final    LDL Cholesterol Calculated 07/09/2024 89  <=100 mg/dL Final    Non HDL Cholesterol 07/09/2024 103  <130 mg/dL Final    Patient Fasting > 8hrs? 07/09/2024 Unknown   Final    Hemoglobin A1C 07/09/2024 6.0 (H)  0.0 - 5.6 % Final        Total time spent  for face to face visit, reviewing labs/imaging studies, counseling and coordination of care was: 1 Hour spent on the date of the encounter doing chart review, review of outside records, review of test results, interpretation of tests, patient visit, and documentation     This note was dictated using voice recognition software.  Any grammatical or context distortions are unintentional and inherent to the software.    Orders Placed This Encounter   Procedures    MR Brain w/o & w Contrast    MR Thoracic Spine w/o & w Contrast    MR Cervical Spine w/o & w Contrast    Angiotensin converting enzyme    Anti Nuclear Lacy IgG by IFA with Reflex    Folate    Methylmalonic Acid    Treponema Abs w Reflex to RPR and Titer    Vitamin E    Vitamin B12    Copper level      New Prescriptions    LORAZEPAM (ATIVAN) 1 MG TABLET    Take 1 tablet 30 minutes before MRI scan      Modified Medications    No medications on file

## 2024-09-06 ENCOUNTER — LAB (OUTPATIENT)
Dept: LAB | Facility: HOSPITAL | Age: 49
End: 2024-09-06
Payer: COMMERCIAL

## 2024-09-06 ENCOUNTER — OFFICE VISIT (OUTPATIENT)
Dept: NEUROLOGY | Facility: CLINIC | Age: 49
End: 2024-09-06
Attending: INTERNAL MEDICINE
Payer: COMMERCIAL

## 2024-09-06 VITALS
DIASTOLIC BLOOD PRESSURE: 81 MMHG | BODY MASS INDEX: 43.21 KG/M2 | SYSTOLIC BLOOD PRESSURE: 133 MMHG | WEIGHT: 280.2 LBS | HEART RATE: 70 BPM

## 2024-09-06 DIAGNOSIS — G44.89 CHRONIC MIXED HEADACHE SYNDROME: Primary | ICD-10-CM

## 2024-09-06 DIAGNOSIS — R90.89 ABNORMAL BRAIN CT: ICD-10-CM

## 2024-09-06 DIAGNOSIS — G37.9 DEMYELINATING DISEASE OF CENTRAL NERVOUS SYSTEM (H): ICD-10-CM

## 2024-09-06 DIAGNOSIS — G44.219 EPISODIC TENSION-TYPE HEADACHE, NOT INTRACTABLE: ICD-10-CM

## 2024-09-06 PROBLEM — D12.3 BENIGN NEOPLASM OF TRANSVERSE COLON: Status: ACTIVE | Noted: 2024-08-20

## 2024-09-06 LAB
FOLATE SERPL-MCNC: 8.5 NG/ML (ref 4.6–34.8)
VIT B12 SERPL-MCNC: 362 PG/ML (ref 232–1245)

## 2024-09-06 PROCEDURE — 99205 OFFICE O/P NEW HI 60 MIN: CPT | Performed by: PSYCHIATRY & NEUROLOGY

## 2024-09-06 PROCEDURE — 84446 ASSAY OF VITAMIN E: CPT

## 2024-09-06 PROCEDURE — 86780 TREPONEMA PALLIDUM: CPT

## 2024-09-06 PROCEDURE — 82607 VITAMIN B-12: CPT

## 2024-09-06 PROCEDURE — 82164 ANGIOTENSIN I ENZYME TEST: CPT

## 2024-09-06 PROCEDURE — 83921 ORGANIC ACID SINGLE QUANT: CPT

## 2024-09-06 PROCEDURE — 82525 ASSAY OF COPPER: CPT

## 2024-09-06 PROCEDURE — 82746 ASSAY OF FOLIC ACID SERUM: CPT

## 2024-09-06 PROCEDURE — 86038 ANTINUCLEAR ANTIBODIES: CPT

## 2024-09-06 PROCEDURE — 36415 COLL VENOUS BLD VENIPUNCTURE: CPT

## 2024-09-06 RX ORDER — LORAZEPAM 1 MG/1
TABLET ORAL
Qty: 1 TABLET | Refills: 0 | Status: SHIPPED | OUTPATIENT
Start: 2024-09-06

## 2024-09-06 NOTE — LETTER
2024      Breanna Fischer  [address] [place] [address]      Dear Colleague,    Thank you for referring your patient, Breanna Fischer, to the [facility] [place]. Please see a copy of my visit note below.    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  She reports excessive fatigue, bladder symptoms and in the past 1 episode of right arm numbness that raise the possibility of demyelinating disease.  I have recommended:    1.  MRI brain, cervical and thoracic spine with and without contrast to rule out demyelinating disease  2.  Lab work to include angiotensin-converting enzyme, DANTE, copper, folate, MMA, RPR, B1 and vitamin E  3.  Patient is claustrophobic and will require Ativan before the MRI scan  4.  Follow-up after above tests    Thank you again for this referral, please feel free to contact me if you have any questions.    Preston Chris MD  [facility] NEUROLOGY, [place]     REASON FOR CONSULTATION Headache        HISTORY OF PRESENT ILLNESS     We have been requested by Dr. 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  She was in her usual state of health until 2024 when she started having retro-orbital discomfort.  This  was associated with some visual symptoms and also had some occipital pain.  She ended up going to the ER and received Reglan and Flexeril.  She had a CT of the head that showed hypoattenuation in white matter and that she was told to follow-up with neurology.  She does report excessive fatigue at time and also has urinary frequency.  In the past she had 1 episode of right arm numbness that resolved spontaneously.  She does have some balance issues.  There is no history of any focal weakness, optic neuritis.     PROBLEM LIST   Patient Active Problem List   Diagnosis     Morbid obesity (H)     Prediabetes     Low vitamin D level     Benign essential hypertension     Celiac disease     Peripheral edema     Chronic saddle pulmonary embolism with acute cor pulmonale (H)     Acute deep vein thrombosis (DVT) of femoral vein of right lower extremity (H)     Benign neoplasm of transverse colon         PAST MEDICAL & SURGICAL HISTORY     Past Medical History:   Patient  has a past medical history of Celiac disease and Hypertension.    Surgical History:  She  has a past surgical history that includes Cholecystectomy; Abdomen surgery (1999); ENT surgery; IR Pulmonary Angiogram Bilateral (3/20/2024); and IR [facility] Venous Thromb (3/21/2024).     SOCIAL HISTORY     Reviewed, and she  reports that she has never smoked. She has never used smokeless tobacco. She reports that she does not drink alcohol and does not use drugs.     FAMILY HISTORY     Reviewed, and family history includes Asthma in her sister; Breast Cancer in her paternal grandmother; Cerebrovascular Disease in her sister; Diabetes in her father and mother; Hypertension in her father and mother; Obesity in her father, mother, and sister; Other Cancer in her sister; Thyroid Disease in her father, mother, and sister.     ALLERGIES     Allergies   Allergen Reactions     Lisinopril Cough     Tea Tree Oil Rash         REVIEW OF SYSTEMS     A 12 point review of system was  performed and was negative except as outlined in the history of present illness.     HOME MEDICATIONS     Current Outpatient Rx   Medication Sig Dispense Refill     apixaban ANTICOAGULANT (ELIQUIS) 5 MG tablet Take 1 tablet (5 mg) by mouth 2 times daily 180 tablet 3     cetirizine (ZYRTEC) 5 MG tablet Take 5-10 mg by mouth daily as needed for allergies       LORazepam (ATIVAN) 1 MG tablet Take 1 tablet 30 minutes before MRI scan 1 tablet 0     losartan (COZAAR) 25 MG tablet Take 1 tablet (25 mg) by mouth daily 90 tablet 2     tiZANidine (ZANAFLEX) 4 MG tablet Take 1 tablet (4 mg) by mouth nightly as needed for muscle spasms 30 tablet 1         PHYSICAL EXAM     Vital signs  /81   Pulse 70   Wt 127.1 kg (280 lb 3.2 oz)   LMP 04/20/2024 (Approximate)   BMI 43.21 kg/m      Weight:   280 lbs 3.2 oz    Patient is alert and oriented x4 in no acute distress. Vital signs were reviewed and are documented in electronic medical record. Neck was supple, no carotid bruits, thyromegaly, JVD, or lymphadenopathy was noted.   NEUROLOGY EXAM:  Speech mentation affect was normal.  Cranial nerves II through XII are intact no ophthalmoplegia.  Normal mass and tone with 5/5 strength.  Reflexes 2+ toes downgoing.  No dysmetria noted on finger-nose testing gait normal she is can tandem walk.     PERTINENT DIAGNOSTIC STUDIES     Following studies were reviewed:     CT BRAIN 4/3/2024  1.  Nonspecific hypoattenuation throughout the cerebral white matter which could represent advanced for age small vessel ischemic disease; suggest correlation with history. Demyelinating process could conceivably have a similar appearance.  2.  No acute intracranial hemorrhage.       PERTINENT LABS  Following labs were reviewed:  Office Visit on 07/09/2024   Component Date Value Ref Range Status     Cholesterol 07/09/2024 142  <200 mg/dL Final     Triglycerides 07/09/2024 71  <150 mg/dL Final     Direct Measure HDL 07/09/2024 39 (L)  >=50 mg/dL Final      LDL Cholesterol Calculated 07/09/2024 89  <=100 mg/dL Final     Non HDL Cholesterol 07/09/2024 103  <130 mg/dL Final     Patient Fasting > 8hrs? 07/09/2024 Unknown   Final     Hemoglobin A1C 07/09/2024 6.0 (H)  0.0 - 5.6 % Final        Total time spent for face to face visit, reviewing labs/imaging studies, counseling and coordination of care was: 1 Hour spent on the date of the encounter doing chart review, review of outside records, review of test results, interpretation of tests, patient visit, and documentation     This note was dictated using voice recognition software.  Any grammatical or context distortions are unintentional and inherent to the software.    Orders Placed This Encounter   Procedures     MR Brain w/o & w Contrast     MR Thoracic Spine w/o & w Contrast     MR Cervical Spine w/o & w Contrast     Angiotensin converting enzyme     Anti Nuclear Lacy IgG by IFA with Reflex     Folate     Methylmalonic Acid     Treponema Abs w Reflex to RPR and Titer     Vitamin E     Vitamin B12     Copper level      New Prescriptions    LORAZEPAM (ATIVAN) 1 MG TABLET    Take 1 tablet 30 minutes before MRI scan      Modified Medications    No medications on file                Again, thank you for allowing me to participate in the care of your patient.        Sincerely,        Preston Chris MD

## 2024-09-06 NOTE — NURSING NOTE
"Breanna Fischer is a 49 year old female who presents for:  Chief Complaint   Patient presents with    Headache     Abnormal CT review        Initial Vitals:  /81   Pulse 70   Wt 127.1 kg (280 lb 3.2 oz)   LMP 04/20/2024 (Approximate)   BMI 43.21 kg/m   Estimated body mass index is 43.21 kg/m  as calculated from the following:    Height as of 7/9/24: 1.715 m (5' 7.52\").    Weight as of this encounter: 127.1 kg (280 lb 3.2 oz).. Body surface area is 2.46 meters squared. BP completed using cuff size: wrist cuff    Dilan KEITH Griffiths  "

## 2024-09-07 LAB
ACE SERPL-CCNC: 36 U/L
T PALLIDUM AB SER QL: NONREACTIVE

## 2024-09-08 LAB — COPPER SERPL-MCNC: 97.4 UG/DL

## 2024-09-09 LAB
A-TOCOPHEROL VIT E SERPL-MCNC: 7.4 MG/L
ANA SER QL IF: NEGATIVE
BETA+GAMMA TOCOPHEROL SERPL-MCNC: 0.8 MG/L

## 2024-09-11 LAB — METHYLMALONATE SERPL-SCNC: 0.22 UMOL/L (ref 0–0.4)

## 2024-09-27 ENCOUNTER — HOSPITAL ENCOUNTER (OUTPATIENT)
Dept: MRI IMAGING | Facility: HOSPITAL | Age: 49
Discharge: HOME | End: 2024-09-27
Attending: PSYCHIATRY & NEUROLOGY | Admitting: PSYCHIATRY & NEUROLOGY
Payer: COMMERCIAL

## 2024-09-27 DIAGNOSIS — R90.89 ABNORMAL BRAIN CT: ICD-10-CM

## 2024-09-27 DIAGNOSIS — G37.9 DEMYELINATING DISEASE OF CENTRAL NERVOUS SYSTEM (H): ICD-10-CM

## 2024-09-27 PROCEDURE — 72156 MRI NECK SPINE W/O & W/DYE: CPT

## 2024-09-27 PROCEDURE — 70553 MRI BRAIN STEM W/O & W/DYE: CPT

## 2024-09-27 PROCEDURE — A9585 GADOBUTROL INJECTION: HCPCS | Performed by: PSYCHIATRY & NEUROLOGY

## 2024-09-27 PROCEDURE — 255N000002 HC RX 255 OP 636: Performed by: PSYCHIATRY & NEUROLOGY

## 2024-09-27 PROCEDURE — 72157 MRI CHEST SPINE W/O & W/DYE: CPT

## 2024-09-27 RX ORDER — GADOBUTROL 604.72 MG/ML
0.1 INJECTION INTRAVENOUS ONCE
Status: COMPLETED | OUTPATIENT
Start: 2024-09-27 | End: 2024-09-27

## 2024-09-27 RX ADMIN — GADOBUTROL 12.71 ML: 604.72 INJECTION INTRAVENOUS at 08:29

## 2024-09-28 DIAGNOSIS — I67.850 CADASIL: ICD-10-CM

## 2024-09-28 DIAGNOSIS — G37.9 DEMYELINATING DISEASE OF CENTRAL NERVOUS SYSTEM (H): Primary | ICD-10-CM

## 2024-09-28 NOTE — RESULT ENCOUNTER NOTE
Dear Breanna    I left a message on your cell phone also. .MRI brain shows confluent white matter changes in the brain.  No such abnormality noted in the cervical or thoracic spine.  This is somewhat unusual finding and can be due to some old inflammatory changes, infectious changes or demyelinating disease.  Patients with history of high blood pressure, diabetes and high cholesterol tend to have such similar changes also.  No active lesions noted in the brain or cervical spine.  As the lab work was normal that could have explain such changes, I would recommend additional workup includin.  Lumbar puncture to check for any changes that we can see in multiple sclerosis   2.  Genetic testing notch 3 gene. Will order this test after I have a chance to talk to you and  you give us your consent  3.  Please let me know if you have any questions    Regards,  Preston Chris MD

## 2024-09-30 ENCOUNTER — TELEPHONE (OUTPATIENT)
Dept: NEUROLOGY | Facility: CLINIC | Age: 49
End: 2024-09-30
Payer: COMMERCIAL

## 2024-09-30 DIAGNOSIS — I67.850 CADASIL: Primary | ICD-10-CM

## 2024-09-30 NOTE — TELEPHONE ENCOUNTER
[facility] Call Center    Phone Message    May a detailed message be left on voicemail: yes     Reason for Call: Patient requests a call back from care team to discuss next steps in her care    Action Taken: [facility]    Travel Screening: Not Applicable     Date of Service:

## 2024-09-30 NOTE — TELEPHONE ENCOUNTER
[employer] message sent to patient    Blayne Carlos,    Dr. Chris ordered a lumbar puncture and some lab work. For the lumbar puncture, scheduling will reach out to you to schedule. If you do not hear from them within 2 business days, please call [phone] to schedule.     For the lab (Has to be done at [facility] or [facility])    Instructions for Blood Draw  ([facility] (2nd floor) or [facility] ([facility])   Hours:   [facility]: M-F 7am-5pm, Saturday- 9am-1pm No appointment is needed.  [facility]: M-F 7am-5pm, Saturday & - 9am-12pm No appointment is needed.  Schedule Lab Appointments through [employer] or by [phone] ([phone])    Once all tests are resulted, I will reach out to you to schedule follow up (I will add you on to Dr. Chris's schedule)    Please let me know if you have questions,  Scarlet

## 2024-09-30 NOTE — TELEPHONE ENCOUNTER
Called and discussed with patient.  She is agreeable to proceed with lumbar puncture and notch 3 gene testing.

## 2024-10-02 ENCOUNTER — PATIENT OUTREACH (OUTPATIENT)
Dept: CARE COORDINATION | Facility: CLINIC | Age: 49
End: 2024-10-02
Payer: COMMERCIAL

## 2024-10-07 ENCOUNTER — MYC MEDICAL ADVICE (OUTPATIENT)
Dept: FAMILY MEDICINE | Facility: CLINIC | Age: 49
End: 2024-10-07
Payer: COMMERCIAL

## 2024-10-08 NOTE — TELEPHONE ENCOUNTER
Hello, please review [employer] message and advise, if appropriate.    Thank you,  VIVIANA De Los Santos

## 2024-11-07 ENCOUNTER — TELEPHONE (OUTPATIENT)
Dept: INTERVENTIONAL RADIOLOGY/VASCULAR | Facility: CLINIC | Age: 49
End: 2024-11-07
Payer: COMMERCIAL

## 2024-11-08 ENCOUNTER — HOSPITAL ENCOUNTER (OUTPATIENT)
Dept: RADIOLOGY | Facility: HOSPITAL | Age: 49
Discharge: HOME | End: 2024-11-08
Attending: PSYCHIATRY & NEUROLOGY
Payer: COMMERCIAL

## 2024-11-08 ENCOUNTER — LAB (OUTPATIENT)
Dept: LAB | Facility: HOSPITAL | Age: 49
End: 2024-11-08
Attending: PSYCHIATRY & NEUROLOGY
Payer: COMMERCIAL

## 2024-11-08 VITALS
DIASTOLIC BLOOD PRESSURE: 78 MMHG | RESPIRATION RATE: 16 BRPM | TEMPERATURE: 97.6 F | SYSTOLIC BLOOD PRESSURE: 144 MMHG | OXYGEN SATURATION: 98 %

## 2024-11-08 DIAGNOSIS — I67.850 CADASIL: ICD-10-CM

## 2024-11-08 DIAGNOSIS — G37.9 DEMYELINATING DISEASE OF CENTRAL NERVOUS SYSTEM (H): ICD-10-CM

## 2024-11-08 LAB
APPEARANCE CSF: CLEAR
APPEARANCE CSF: CLEAR
COLOR CSF: COLORLESS
COLOR CSF: COLORLESS
GLUCOSE CSF-MCNC: 57 MG/DL (ref 40–70)
LYMPH ABN NFR CSF MANUAL: 72 %
MONOS+MACROS NFR CSF MANUAL: 8 %
NEUTROPHILS NFR CSF MANUAL: 20 %
PROT CSF-MCNC: 25.9 MG/DL (ref 15–45)
RBC # CSF MANUAL: 1 /UL (ref 0–2)
RBC # CSF MANUAL: 564 /UL (ref 0–2)
TUBE # CSF: 1
TUBE # CSF: 4
WBC # CSF MANUAL: 1 /UL (ref 0–5)
WBC # CSF MANUAL: 10 /UL (ref 0–5)

## 2024-11-08 PROCEDURE — 62328 DX LMBR SPI PNXR W/FLUOR/CT: CPT

## 2024-11-08 PROCEDURE — G0452 MOLECULAR PATHOLOGY INTERPR: HCPCS | Mod: 26 | Performed by: PATHOLOGY

## 2024-11-08 PROCEDURE — 84157 ASSAY OF PROTEIN OTHER: CPT

## 2024-11-08 PROCEDURE — 83916 OLIGOCLONAL BANDS: CPT

## 2024-11-08 PROCEDURE — 82945 GLUCOSE OTHER FLUID: CPT

## 2024-11-08 PROCEDURE — 36415 COLL VENOUS BLD VENIPUNCTURE: CPT

## 2024-11-08 PROCEDURE — 87205 SMEAR GRAM STAIN: CPT

## 2024-11-08 PROCEDURE — 89051 BODY FLUID CELL COUNT: CPT

## 2024-11-08 PROCEDURE — 81479 UNLISTED MOLECULAR PATHOLOGY: CPT | Performed by: PSYCHIATRY & NEUROLOGY

## 2024-11-08 PROCEDURE — 82164 ANGIOTENSIN I ENZYME TEST: CPT

## 2024-11-08 PROCEDURE — 81406 MOPATH PROCEDURE LEVEL 7: CPT | Performed by: PSYCHIATRY & NEUROLOGY

## 2024-11-08 NOTE — PROVIDER NOTIFICATION
Discharged via wheelchair to , No pain at present, verbalized understanding of discharge instructions. Belongings returned.

## 2024-11-08 NOTE — DISCHARGE INSTRUCTIONS
1. Rest today lying down as much as possible. Avoid strenuous activity for 48 hours.    2. You should follow your normal diet and drink plenty of fluids.    3. Keep procedure site dry and clean.    4. Follow-up with the doctor who ordered this test in 7 days regarding the results of the procedure.    5. Call your doctor with any further questions, concerns, and to resume pre-procedure medications.         ADDITIONAL INSTRUCTIONS    When to call you Doctor:    1. On-going severe headache, nausea, vomiting, or un-usual increase in pain call your doctor or 9-1-1 or go to the emergency room.     Dr. Chris [phone]    [facility] [phone]

## 2024-11-12 ENCOUNTER — ANCILLARY PROCEDURE (OUTPATIENT)
Dept: MAMMOGRAPHY | Facility: CLINIC | Age: 49
End: 2024-11-12
Attending: INTERNAL MEDICINE
Payer: COMMERCIAL

## 2024-11-12 DIAGNOSIS — Z12.31 VISIT FOR SCREENING MAMMOGRAM: ICD-10-CM

## 2024-11-12 LAB — ACE CSF-CCNC: 1.6 U/L

## 2024-11-12 PROCEDURE — 77063 BREAST TOMOSYNTHESIS BI: CPT

## 2024-11-13 ENCOUNTER — TELEPHONE (OUTPATIENT)
Dept: NEUROLOGY | Facility: CLINIC | Age: 49
End: 2024-11-13
Payer: COMMERCIAL

## 2024-11-13 LAB
ALB CSF/SERPL: 4 RATIO
ALBUMIN CSF-MCNC: 17 MG/DL
ALBUMIN SERPL-MCNC: 4200 MG/DL
BACTERIA CSF CULT: NO GROWTH
GRAM STAIN RESULT: NORMAL
GRAM STAIN RESULT: NORMAL
IGG CSF-MCNC: 2.7 MG/DL
IGG SERPL-MCNC: 1330 MG/DL
IGG SYNTH RATE SER+CSF CALC-MRATE: <0 MG/D
IGG/ALB CLEAR SER+CSF-RTO: 0.5 RATIO
IGG/ALB CSF: 0.16 RATIO
OLIGOCLONAL BANDS CSF ELPH-IMP: NEGATIVE
OLIGOCLONAL BANDS CSF ELPH-IMP: NORMAL
OLIGOCLONAL BANDS CSF IEF: NORMAL BANDS

## 2024-11-13 NOTE — PROGRESS NOTES
GENETIC COUNSELING-Neurology  Reached out to patient. A NOTCH3 genetic test was ordered previously, but her insurance company requires a genetic consult for coverage. I offered possible appointment times and asked patient to call back to schedule.    Victor Hugo Mittal MS, [facility] [place]  Certified Genetic Counselor

## 2024-12-13 ENCOUNTER — VIRTUAL VISIT (OUTPATIENT)
Dept: NEUROLOGY | Facility: CLINIC | Age: 49
End: 2024-12-13
Payer: COMMERCIAL

## 2024-12-13 VITALS — WEIGHT: 280 LBS | HEIGHT: 68 IN | BODY MASS INDEX: 42.44 KG/M2

## 2024-12-13 DIAGNOSIS — R90.82 WHITE MATTER ABNORMALITY ON MRI OF BRAIN: Primary | ICD-10-CM

## 2024-12-13 PROCEDURE — 96040 PR GENETIC COUNSELING, EACH 30 MIN: CPT | Mod: 95 | Performed by: GENETIC COUNSELOR, MS

## 2024-12-13 ASSESSMENT — PAIN SCALES - GENERAL: PAINLEVEL_OUTOF10: NO PAIN (0)

## 2024-12-13 NOTE — NURSING NOTE
Current patient location: [address] [place] [address]    Is the patient currently in the state of [place]? YES    Visit mode:VIDEO    If the visit is dropped, the patient can be reconnected by:VIDEO VISIT: Text to cell phone:   Telephone Information:   Mobile [phone]       Will anyone else be joining the visit? NO  (If patient encounters technical issues they should call [phone]    Are changes needed to the allergy or medication list? No    Are refills needed on medications prescribed by this physician? NO    Rooming Documentation:  Questionnaire(s) completed    Reason for visit: Consult    Vera REYEZ

## 2024-12-13 NOTE — PROGRESS NOTES
GENETIC COUNSELING-Neurology  Breanna Fischer was seen today for genetic consultation.  I spent a total of 30 minutes with the patient with the majority of the time being spent on genetic counseling and testing options. The patient was previously seen by Dr. Chris who had referred her to discuss genetic testing for CADASIL due NOTCH3.     MEDICAL HISTORY  Please see Dr. Chris's notes for a more thorough summary of medical history. Briefly, she reports a history of unexplained falls, changes in sensation, and headache. A CT scan earlier this year identified a significant degree of white matter hypointensity that prompted clinical concern for CADASIL.     FAMILY HISTORY-see scanned pedigree  Breanna has three healthy adult children.  Breanna has a healthy younger sister.  Breanna's father is 81 years old and has a history of dementia.  Breanna's paternal grandmother  in her 80's and had a history of both breast cancer and dementia.  Breanna's mother is alive at 75 with no neurologic disease.  Breanna's maternal grandmother  at age 85 and had a history of stroke.        GENETIC COUNSELING  We discussed the genetic and environmental basis of neurologic disease. I explained that in most cases, it results from complex and lifelong interaction of multiple genetic and environmental factors. In some cases, there may be a single gene cause. I explained that the presence of additional family members and/or young ages at diagnosis are typically clues for a single-gene cause.    We discussed the genetic basis of CADASIL. I explained that this is an autosomal dominant condition that is often characterized by migraine headaches, changes in cognition, and stroke.  Imaging findings typically include substantial white matter changes.  Symptoms can be highly variable from person to person, but symptoms do typically progress over time.  I explained that CADASIL is caused by mutations in the NOTCH3 gene. If a pathogenic NOTCH3 variant is identified, this  would provide a confirmation of diagnosis and it would indicate that each of Breanna's children and siblings is at 50% risk.  If the testing is negative, it would exclude NOTCH3-related CADASIL as the explanation for her clinical and imaging findings. We also discussed the possibility of uncertain findings- but this is much likely in this scenario as this is a targeted genetic test in for a well-characterized gene.    We discussed the utility and necessity of this testing for Breanna.  She has significant white matter changes on brain imaging and this testing could confirm or exclude a very specific diagnosis of CADASIL. This would directly impact medical management as individuals with CADASIL have a very significant increased risk for stroke. In addition, the information is critical for her family as this is condition is autosomal dominant and if confirmed would have direct bearing on her children's medical and reproductive decision making.        PLAN  Following our discussion, Breanna consented to genetic testing for CADASIL. We will attempt to obtain prior authorization before proceeding.    Victor Hugo Mittal MS, [facility]  Certified Genetic Counselor  [facility]

## 2024-12-13 NOTE — LETTER
2024       RE: Breanna Fischer  [address] [place] [address]     Dear Colleague,    Thank you for referring your patient, Breanna Fischer, to the [facility]. Please see a copy of my visit note below.    GENETIC COUNSELING-Neurology  Breanna Fischer was seen today for genetic consultation.  I spent a total of 30 minutes with the patient with the majority of the time being spent on genetic counseling and testing options. The patient was previously seen by Dr. Chris who had referred her to discuss genetic testing for CADASIL due NOTCH3.     MEDICAL HISTORY  Please see Dr. Chris's notes for a more thorough summary of medical history. Briefly, she reports a history of unexplained falls, changes in sensation, and headache. A CT scan earlier this year identified a significant degree of white matter hypointensity that prompted clinical concern for CADASIL.     FAMILY HISTORY-see scanned pedigree  Breanna has three healthy adult children.  Breanna has a healthy younger sister.  Breanna's father is 81 years old and has a history of dementia.  Breanna's paternal grandmother  in her 80's and had a history of both breast cancer and dementia.  Breanna's mother is alive at 75 with no neurologic disease.  Breanna's maternal grandmother  at age 85 and had a history of stroke.        GENETIC COUNSELING  We discussed the genetic and environmental basis of neurologic disease. I explained that in most cases, it results from complex and lifelong interaction of multiple genetic and environmental factors. In some cases, there may be a single gene cause. I explained that the presence of additional family members and/or young ages at diagnosis are typically clues for a single-gene cause.    We discussed the genetic basis of CADASIL. I explained that this is an autosomal dominant condition that is often characterized by migraine headaches, changes in  cognition, and stroke.  Imaging findings typically include substantial white matter changes.  Symptoms can be highly variable from person to person, but symptoms do typically progress over time.  I explained that CADASIL is caused by mutations in the NOTCH3 gene. If a pathogenic NOTCH3 variant is identified, this would provide a confirmation of diagnosis and it would indicate that each of Breanna's children and siblings is at 50% risk.  If the testing is negative, it would exclude NOTCH3-related CADASIL as the explanation for her clinical and imaging findings. We also discussed the possibility of uncertain findings- but this is much likely in this scenario as this is a targeted genetic test in for a well-characterized gene.    We discussed the utility and necessity of this testing for Breanna.  She has significant white matter changes on brain imaging and this testing could confirm or exclude a very specific diagnosis of CADASIL. This would directly impact medical management as individuals with CADASIL have a very significant increased risk for stroke. In addition, the information is critical for her family as this is condition is autosomal dominant and if confirmed would have direct bearing on her children's medical and reproductive decision making.        PLAN  Following our discussion, Breanna consented to genetic testing for CADASIL. We will attempt to obtain prior authorization before proceeding.    Victor Hugo Mittal MS, [facility]  Certified Genetic Counselor  [facility]     Again, thank you for allowing me to participate in the care of your patient.      Sincerely,    Naveed Mittal GC

## 2024-12-30 ENCOUNTER — MYC REFILL (OUTPATIENT)
Dept: FAMILY MEDICINE | Facility: CLINIC | Age: 49
End: 2024-12-30
Payer: COMMERCIAL

## 2024-12-30 ENCOUNTER — MYC REFILL (OUTPATIENT)
Dept: ONCOLOGY | Facility: HOSPITAL | Age: 49
End: 2024-12-30
Payer: COMMERCIAL

## 2024-12-30 DIAGNOSIS — Z00.00 ROUTINE GENERAL MEDICAL EXAMINATION AT A HEALTH CARE FACILITY: ICD-10-CM

## 2024-12-30 DIAGNOSIS — I10 BENIGN ESSENTIAL HYPERTENSION: ICD-10-CM

## 2024-12-30 DIAGNOSIS — I82.411 ACUTE DEEP VEIN THROMBOSIS (DVT) OF FEMORAL VEIN OF RIGHT LOWER EXTREMITY (H): ICD-10-CM

## 2024-12-30 DIAGNOSIS — I26.02 ACUTE SADDLE PULMONARY EMBOLISM WITH ACUTE COR PULMONALE (H): ICD-10-CM

## 2024-12-30 RX ORDER — LOSARTAN POTASSIUM 25 MG/1
25 TABLET ORAL DAILY
Qty: 90 TABLET | Refills: 0 | Status: SHIPPED | OUTPATIENT
Start: 2024-12-30

## 2024-12-30 NOTE — TELEPHONE ENCOUNTER
Refusing refill request, should have refills at the pharmacy.    Last Written Prescription Date:  6/21/24  Last Fill Quantity: 180,  # refills: 3   Last office visit provider:  6/21/24 with Dr. Zhu, follow up in 1 year     Requested Prescriptions   Pending Prescriptions Disp Refills    apixaban ANTICOAGULANT (ELIQUIS) 5 MG tablet 180 tablet 3     Sig: Take 1 tablet (5 mg) by mouth 2 times daily.       Anticoagulant Agents Failed - 12/30/2024  1:15 PM        Failed - Recent (6 mo) or future (90 days) visit within the authorizing provider's specialty        Passed - Normal Platelets on file in past 12 months     Recent Labs   Lab Test 03/22/24  0428                  Passed - Medication is active on med list        Passed - Patient is 18-79 years of age        Passed - Serum creatinine less than or equal to 1.4 on file in past 12 mos     Recent Labs   Lab Test 03/28/24  0906   CR 0.69                 Passed - Weight is greater than 60 kg for the past year     Wt Readings from Last 3 Encounters:   12/13/24 127 kg (280 lb)   09/06/24 127.1 kg (280 lb 3.2 oz)   07/09/24 125.8 kg (277 lb 4.8 oz)             Passed - GFR on file in the past 12 months and most recent GFR is normal        Passed - Medication indicated for associated diagnosis     Medication is associated with one or more of the following diagnoses:  Atrial fibrillation  Deep venous thrombosis  Heparin-induced thrombocytopenia  Pulmonary embolism  Venous thromboembolism  H/O: Pulmonary embolus  Atrial flutter  Coronary artery finding  Transient cerebral ischemia  Percutaneous transluminal coronary angioplasty  Stable angina  Intermittent claudication  Arteriosclerotic vascular disease          Passed - No active pregnancy on record        Passed - No positive pregnancy test within past 12 months             Kaye Byrd RN 12/30/24 1:15 PM

## 2025-03-05 NOTE — TELEPHONE ENCOUNTER
Called pt to assist with scheduling an MA-only appt for step swab. Pt lives in [place] and would prefer to have visit closer to home. Gave pt phone number to [facility] office to call and ask for MA visit. Gave pt phone number to [facility] in case she is unable to get an appt-- instructed to call back if she needs help scheduling.    Breanna Lafleur,  [facility]     Letter is in accordion folder up front for patient to  and is also available in [employer].

## 2025-03-26 ENCOUNTER — E-VISIT (OUTPATIENT)
Dept: FAMILY MEDICINE | Facility: CLINIC | Age: 50
End: 2025-03-26
Payer: COMMERCIAL

## 2025-03-26 DIAGNOSIS — M54.50 LOW BACK PAIN, UNSPECIFIED BACK PAIN LATERALITY, UNSPECIFIED CHRONICITY, UNSPECIFIED WHETHER SCIATICA PRESENT: Primary | ICD-10-CM

## 2025-03-26 NOTE — TELEPHONE ENCOUNTER
Provider E-Visit time total (minutes): 21 minutes        Sent in [employer] message as below =       Reinaldo Durhamry to hear that, given your description of back pain this appears most likely back muscle spasm which will need a muscle relaxer and physical therapy which can help you to get better.  But again if it is not getting better in the next 4 weeks with this recommendations I would request you to make an in person appointment for evaluation and need for imaging if any.    Please let me know if you have any questions.     Thank you  Luciana Franco MD on 3/26/2025 at 2:42 PM

## 2025-03-26 NOTE — PATIENT INSTRUCTIONS
Thank you for choosing us for your care. I have placed an order for a prescription so that you can start treatment. View your full visit summary for details by clicking on the link below. Your pharmacist will able to address any questions you may have about the medication.      If you're not feeling better within 2-3 weeks please schedule an appointment.  You can schedule an appointment right here in [facility], or call [phone]  If the visit is for the same symptoms as your eVisit, we'll refund the cost of your eVisit if seen within seven days.      Mini Relaxation Exercises  Source [email or web address]    Mini relaxation exercises are focused breathing techniques that help reduce  anxiety and tension immediately. You can do them with your eyes open or  closed. You can do them any place, at any time; no one will know that you are  doing them.    Good Times to Do a  Mini     Before taking an exam    While at the computer lab waiting for your document to print    While waiting in line    When someone says something that bothers you    While waiting for the announcement of a grade    While waiting for a phone call    In the dentist s chair    When you feel overwhelmed by what you need to accomplish in the near  future    When in pain    When you want to     Mini Version 1- Breath Focused  Position yourself in a supportive comfortable chair or lying in a position that is least painful. (Often this is on your back with pillows under your knees)    Count very slowly to yourself from ten down to zero, one number for  each breath. Thus, with the first diaphragmatic breath, you say  ten  to  yourself, with the next breath, you say  nine , etc. If you start feeling  light-headed or dizzy, slow down the counting. When you get to  zero ,  see how you are feeling. If you are feeling better, great! If not, try to  do it again.    b. As you inhale, count very slowly up to four; as you exhale, count slowly  back down to one.  Thus, as you inhale, you say to yourself  one, two,  three, four , as you exhale, you say to yourself  four, three, two, one .  Do this several times.    c. After each inhalation, pause for a few seconds; after you exhale, pause  again for a few seconds. Do this for several breaths.    Mini Version 2- Physical Focused  a. Massage your forehead, jaw, back of neck, and shoulders  b. Stretch and yawn  c. Walk counting four paces as you breathe in and four paces as you  breathe out  d. Coordinate your breath with any activity, for example, writing, jogging,  drawing, lifting weights, walking, etc.    Mini Version 3- Imagery Focused  Position yourself in a supportive comfortable chair or lying in a position that is least painful. (Often this is on your back with pillows under your knees)    a. Briefly picture yourself in a place you find relaxing  b. Contemplate a picture of a natural scene  c. Imagine the characteristics of one of the following or any other object  which portrays characteristics you find calming or supportive in the  moment:    Tree (strong, rooted, expansive)    Mountain (timeless, strong, stable)    Waves (fluid, limitless)    Sun (radiant, warm)    Wind (free)    Mini Version 4- Mindfulness Focused  a. Look out the window for a few moments  b. Notice something new  c. Listen. What is the most distant sound you hear? The next distant?  d. Appreciate the sensation of being in the situation, the feel, smell, sight of  certain objects  e. Focus on being exactly where you are, keeping your thoughts from flowing  into the future or past      Stress Reduction on the Fly    When most people think of stress reduction they think of activities like meditation, getting a massage, or going to the gym.  While these types of activities are great for stress reduction and lifestyle improvement, they often require time, effort or financial commitments that can become barriers for some people.  There are things,  however, that you can do, without special training, equipment, or setting aside a lot of time in your day that will help relieve stress, and increase your ability to cope with life s ups and downs.  Here are some simple suggestions, perhaps you can add a few of your own.   3 breath meditation. Breathe in and out using your diaphragm (if you don t already know how to do this we can show you how) Three deep breaths. Focus your attention on the feeling of the air going in and out through your nose.  Just for those three breaths focus only on the sensation of breathing. If something else comes into your mind just come back to the sensation of your breathing.   Stretch and yawn  Laugh   Recall a funny scene from your favorite movie. Caution: laughing  with  works better than laughing  at .  Look at or remember something beautiful  Arrange your commute so you go through the park. Visit a neighborhood flower shop just to smell the roses. Try to look at a single snowflake.  Find the classical station on the radio. What kinds of art do you like? Why?  Imagine    a. Briefly picture yourself in a place you find relaxing  b. Contemplate a picture of a natural scene  c. Imagine the characteristics of one of the following or any other object  which portrays characteristics you find calming or supportive in the  moment:  Tree (strong, rooted, expansive)  Mountain (timeless, strong, stable)  Waves (fluid, limitless)  Sun (radiant, warm)  Wind (free)    Be present in the moment    a. Look out the window for a few moments  b. Notice something new  c. Listen. What is the most distant sound you hear? The next distant?  d. Appreciate the sensation of being in the situation, the feel, smell, sight of  certain objects  e. Focus on being exactly where you are, keeping your thoughts from flowing  into the future or past

## 2025-03-27 ENCOUNTER — TELEPHONE (OUTPATIENT)
Dept: FAMILY MEDICINE | Facility: CLINIC | Age: 50
End: 2025-03-27
Payer: COMMERCIAL

## 2025-04-01 ENCOUNTER — VIRTUAL VISIT (OUTPATIENT)
Dept: FAMILY MEDICINE | Facility: CLINIC | Age: 50
End: 2025-04-01
Payer: COMMERCIAL

## 2025-04-01 DIAGNOSIS — I73.9 CLAUDICATION OF BOTH LOWER EXTREMITIES: Primary | ICD-10-CM

## 2025-04-01 DIAGNOSIS — G37.9 DEMYELINATING DISEASE OF CENTRAL NERVOUS SYSTEM (H): ICD-10-CM

## 2025-04-01 DIAGNOSIS — R06.09 EXERTIONAL DYSPNEA: ICD-10-CM

## 2025-04-01 DIAGNOSIS — I10 BENIGN ESSENTIAL HYPERTENSION: ICD-10-CM

## 2025-04-01 DIAGNOSIS — I27.82 CHRONIC SADDLE PULMONARY EMBOLISM WITH ACUTE COR PULMONALE (H): ICD-10-CM

## 2025-04-01 DIAGNOSIS — R60.0 PERIPHERAL EDEMA: ICD-10-CM

## 2025-04-01 DIAGNOSIS — I26.02 CHRONIC SADDLE PULMONARY EMBOLISM WITH ACUTE COR PULMONALE (H): ICD-10-CM

## 2025-04-01 PROCEDURE — 98006 SYNCH AUDIO-VIDEO EST MOD 30: CPT | Performed by: INTERNAL MEDICINE

## 2025-04-01 RX ORDER — LOSARTAN POTASSIUM 25 MG/1
25 TABLET ORAL DAILY
Qty: 90 TABLET | Refills: 0 | Status: SHIPPED | OUTPATIENT
Start: 2025-04-01

## 2025-04-01 NOTE — PATIENT INSTRUCTIONS
As discussed >>Will consider rechecking your echocardiogram given your ongoing exertional dyspnea along with edema.    Will consider checking arterial Doppler of your bilateral lower extremities which could be causing your claudication pain if it is positive for peripheral arterial disease.      Started on Cymbalta for weight loss if it is covered by her insurance - Fortunately.    Please keep up the physical therapy ordered for your back pain if this is something which is causing your ongoing leg concerns, at least make it 4 weeks therapy before I consider MRI of the back after examining him/evaluating in person if physical therapy fails.  Most

## 2025-04-01 NOTE — PROGRESS NOTES
Breanna is a 49 year old who is being evaluated via a billable video visit.    How would you like to obtain your AVS? MyChart  If the video visit is dropped, the invitation should be resent by: Text to cell phone: [phone]  Will anyone else be joining your video visit? No          Assessment and Plan  1. Claudication of both lower extremities (Primary)  New problem which patient endorses that in spite of her compression stockings she feels the claudication of bilateral lower extremities which is mostly debilitating on her ADLs, given the risk factors mentioned will consider checking her arterial Doppler with possible peripheral arterial disease to be excluded.  I do not suspect DVTs given that she is already on Eliquis at this time.  Patient understood and agreed the plan.  - US Lower Extremity Arterial Duplex Bilateral; Future    2. 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  Please see below for details on the plan.    3. Exertional dyspnea  4. Peripheral edema  Ongoing problem, uncontrolled with patient endorses that she is anxious regarding her previous echocardiogram which was abnormal in the past when she had diagnosis of her pulmonary embolism with cor pulmonale in 2024.  Shared decision to recheck her echocardiogram as well as all the complete workup needed for excluding heart failure.  -Differential diagnosis to exclude pulmonary hypertension severity if any due to the risk factors.  - BNP-N terminal pro; Future  - Basic metabolic panel  (Ca, Cl, CO2, Creat, Gluc, K, Na, BUN); Future  - Echocardiogram Complete; Future    5. Body mass index (BMI) 40.0-44.9, adult (H)  Chronic problem, uncontrolled.  Patient has tried and failed to get the insurance coverage for semaglutide's, shared decision to start on tirzepatide and see if this is covered by her insurance given her morbid obesity  of BMI greater than 40.  Patient understood and agreed the plan.  - tirzepatide-Weight Management (ZEPBOUND) 2.5 MG/0.5ML prefilled pen; Inject 0.5 mLs (2.5 mg) subcutaneously every 7 days.  Dispense: 3 mL; Refill: 1    6. Demyelinating disease of central nervous system (H)  Patient is following neurology for possible MS diagnosis to be excluded.    7. Benign essential hypertension  Chronic stable, continue current losartan.  - losartan (COZAAR) 25 MG tablet; Take 1 tablet (25 mg) by mouth daily.  Dispense: 90 tablet; Refill: 0       The longitudinal plan of care for the diagnosis(es)/condition(s) as documented were addressed during this visit. Due to the added complexity in care, I will continue to support Breanna in the subsequent management and with ongoing continuity of care.      Please note that this note consists of symbols derived from keyboarding, dictation and/or voice recognition software. As a result, there may be errors in the script that have gone undetected. Please consider this when interpreting information found in this chart.    Patient Instructions   As discussed >>Will consider rechecking your echocardiogram given your ongoing exertional dyspnea along with edema.    Will consider checking arterial Doppler of your bilateral lower extremities which could be causing your claudication pain if it is positive for peripheral arterial disease.      Started on Cymbalta for weight loss if it is covered by her insurance - Fortunately.    Please keep up the physical therapy ordered for your back pain if this is something which is causing your ongoing leg concerns, at least make it 4 weeks therapy before I consider MRI of the back after examining him/evaluating in person if physical therapy fails.  Most    Return in about 4 months (around 7/31/2025), or if symptoms worsen or fail to improve, for If symptoms persist, Follow up of last visit.    Luciana Franco MD  [facility]  LIZETH Carlos is a 49 year old, presenting for the following health issues:  No chief complaint on file.        7/9/2024     8:47 AM   Additional Questions   Roomed by Marcella BAKER     History of Present Illness       Reason for visit:  Imaging, PT option    She eats 4 or more servings of fruits and vegetables daily.She consumes 0 sweetened beverage(s) daily.She exercises with enough effort to increase her heart rate 10 to 19 minutes per day.  She exercises with enough effort to increase her heart rate 5 days per week.   She is taking medications regularly.         Last seen patient in July 2024 for annual physical, she is here for follow-up visit.  She has submitted e-visit for back pain in the interim, does have multiple other concerns she wants to discuss.  Currently on Eliquis for pulmonary embolism saddle type.      Allergies   Allergen Reactions    Lisinopril Cough    Tea Tree Oil Rash        Past Medical History:   Diagnosis Date    Celiac disease     Hypertension        Past Surgical History:   Procedure Laterality Date    ABDOMEN SURGERY  1999    Gall bladder    CHOLECYSTECTOMY      ENT SURGERY      IR MECH VENOUS THROMB  3/21/2024    IR PULMONARY ANGIOGRAM BILATERAL  3/20/2024       Family History   Problem Relation Age of Onset    Obesity Mother     Thyroid Disease Mother     Diabetes Mother     Hypertension Mother     Thyroid Disease Father     Diabetes Father     Hypertension Father     Obesity Father     Breast Cancer Paternal Grandmother     Obesity Sister     Thyroid Disease Sister     Cerebrovascular Disease Sister     Other Cancer Sister     Asthma Sister     Melanoma No family hx of     Skin Cancer No family hx of        Social History     Tobacco Use    Smoking status: Never    Smokeless tobacco: Never   Substance Use Topics    Alcohol use: No        Current Outpatient Medications   Medication Sig Dispense Refill    losartan (COZAAR) 25 MG tablet Take 1 tablet (25 mg) by mouth  daily. 90 tablet 0    tirzepatide-Weight Management (ZEPBOUND) 2.5 MG/0.5ML prefilled pen Inject 0.5 mLs (2.5 mg) subcutaneously every 7 days. 3 mL 1    apixaban ANTICOAGULANT (ELIQUIS) 5 MG tablet Take 1 tablet (5 mg) by mouth 2 times daily 180 tablet 3    cetirizine (ZYRTEC) 5 MG tablet Take 5-10 mg by mouth daily as needed for allergies      tiZANidine (ZANAFLEX) 4 MG tablet Take 1 tablet (4 mg) by mouth nightly as needed for muscle spasms. Causes drowsiness 30 tablet 1     No current facility-administered medications for this visit.          Review of Systems  Constitutional, HEENT, cardiovascular, pulmonary, GI, , musculoskeletal, neuro, skin, endocrine and psych systems are negative, except as otherwise noted.      Objective           Vitals:  No vitals were obtained today due to virtual visit.    Physical Exam   GENERAL: alert and no distress  EYES: Eyes grossly normal to inspection.  No discharge or erythema, or obvious scleral/conjunctival abnormalities.  RESP: No audible wheeze, cough, or visible cyanosis.    SKIN: Visible skin clear. No significant rash, abnormal pigmentation or lesions.  NEURO: Cranial nerves grossly intact.  Mentation and speech appropriate for age.  PSYCH: Appropriate affect, tone, and pace of words    Video-Visit Details    Type of service:  Video Visit   Originating Location (pt. Location): Home    Distant Location (provider location):  On-site  Platform used for Video Visit: [employer]  Signed Electronically by: Luciana Franco MD

## 2025-04-03 ENCOUNTER — LAB (OUTPATIENT)
Dept: LAB | Facility: CLINIC | Age: 50
End: 2025-04-03
Payer: COMMERCIAL

## 2025-04-03 DIAGNOSIS — I67.850 CADASIL: Primary | ICD-10-CM

## 2025-04-10 ENCOUNTER — LAB (OUTPATIENT)
Dept: LAB | Facility: HOSPITAL | Age: 50
End: 2025-04-10
Payer: COMMERCIAL

## 2025-04-10 DIAGNOSIS — R06.09 EXERTIONAL DYSPNEA: ICD-10-CM

## 2025-04-10 DIAGNOSIS — R60.0 PERIPHERAL EDEMA: ICD-10-CM

## 2025-04-10 LAB
ANION GAP SERPL CALCULATED.3IONS-SCNC: 6 MMOL/L (ref 7–15)
BUN SERPL-MCNC: 16.7 MG/DL (ref 6–20)
CALCIUM SERPL-MCNC: 9.4 MG/DL (ref 8.8–10.4)
CHLORIDE SERPL-SCNC: 107 MMOL/L (ref 98–107)
CREAT SERPL-MCNC: 0.63 MG/DL (ref 0.51–0.95)
EGFRCR SERPLBLD CKD-EPI 2021: >90 ML/MIN/1.73M2
GLUCOSE SERPL-MCNC: 105 MG/DL (ref 70–99)
HCO3 SERPL-SCNC: 28 MMOL/L (ref 22–29)
NT-PROBNP SERPL-MCNC: 72 PG/ML (ref 0–900)
POTASSIUM SERPL-SCNC: 4.4 MMOL/L (ref 3.4–5.3)
SODIUM SERPL-SCNC: 141 MMOL/L (ref 135–145)

## 2025-04-10 PROCEDURE — 36415 COLL VENOUS BLD VENIPUNCTURE: CPT

## 2025-04-10 PROCEDURE — 82565 ASSAY OF CREATININE: CPT

## 2025-04-10 PROCEDURE — 83880 ASSAY OF NATRIURETIC PEPTIDE: CPT

## 2025-04-10 PROCEDURE — 80048 BASIC METABOLIC PNL TOTAL CA: CPT

## 2025-05-08 ENCOUNTER — HOSPITAL ENCOUNTER (OUTPATIENT)
Dept: ULTRASOUND IMAGING | Facility: HOSPITAL | Age: 50
Discharge: HOME | End: 2025-05-08
Attending: INTERNAL MEDICINE
Payer: COMMERCIAL

## 2025-05-08 ENCOUNTER — HOSPITAL ENCOUNTER (OUTPATIENT)
Dept: CARDIOLOGY | Facility: CLINIC | Age: 50
Discharge: HOME | End: 2025-05-08
Attending: INTERNAL MEDICINE
Payer: COMMERCIAL

## 2025-05-08 DIAGNOSIS — I73.9 CLAUDICATION OF BOTH LOWER EXTREMITIES: ICD-10-CM

## 2025-05-08 DIAGNOSIS — R60.0 PERIPHERAL EDEMA: ICD-10-CM

## 2025-05-08 DIAGNOSIS — I26.02 CHRONIC SADDLE PULMONARY EMBOLISM WITH ACUTE COR PULMONALE (H): ICD-10-CM

## 2025-05-08 DIAGNOSIS — I27.82 CHRONIC SADDLE PULMONARY EMBOLISM WITH ACUTE COR PULMONALE (H): ICD-10-CM

## 2025-05-08 DIAGNOSIS — R06.09 EXERTIONAL DYSPNEA: ICD-10-CM

## 2025-05-08 LAB — LVEF ECHO: NORMAL

## 2025-05-08 PROCEDURE — 255N000002 HC RX 255 OP 636: Performed by: INTERNAL MEDICINE

## 2025-05-08 PROCEDURE — 93924 LWR XTR VASC STDY BILAT: CPT

## 2025-05-08 RX ADMIN — PERFLUTREN 3 ML: 6.52 INJECTION, SUSPENSION INTRAVENOUS at 13:35

## 2025-05-19 ENCOUNTER — TELEPHONE (OUTPATIENT)
Dept: ONCOLOGY | Facility: HOSPITAL | Age: 50
End: 2025-05-19
Payer: COMMERCIAL

## 2025-05-19 NOTE — TELEPHONE ENCOUNTER
Patient calls to ask a medication question. She reports that she called on 5/9 and was given the ok to use topical estrogen as long as she is taking her blood thinner. Patient is wanting to get Dr. Zhu's advice on also using an estrogen patch 0.0375 mg and progesterone 200 mg orally at bedtime.    Message will be sent to Dr. Zhu for recommendations.    Kaye Byrd RN

## 2025-05-20 NOTE — TELEPHONE ENCOUNTER
Return call placed to patient, message is left for her to return call to triage nurse. Per Dr. Zhu the decision to use these medication needs to be discussed and made with her provider that is recommending them. They should discuss the pros and cons, and risks associated even though she is on a blood thinner.     Kaye Byrd RN

## 2025-05-21 NOTE — TELEPHONE ENCOUNTER
Attempted to call patient with Dr. Zhu's advice below. Left a message requesting a call back.     Cherise Paz RN on 5/21/2025 at 9:43 AM

## 2025-05-21 NOTE — TELEPHONE ENCOUNTER
Breanna called the triage line back about the messages that she received.     She states that she has spoken at length with her OB that prescribed these medications, but wants Dr. Zhu's input on whether she thinks they are OK to take as well. She states they are the Estradial patch  0.0375 mg and progesterone 200 mg orally at bedtime.      Explained to her Audra's message of having her talk with the doctor prescribing these for her, in this case, her OB. She states that she still wants Dr. Zhu to say that it is ok. Let her know that Dr. Zhu may say the same thing again without giving an answer to that and she states that she wants to find that out.     Will pass this along to Dr. Zhu for review.     Jessica Huitron RN on 5/21/2025 at 1:45 PM     1 person assist

## 2025-05-21 NOTE — TELEPHONE ENCOUNTER
Breanna called back and left a voice message requesting a phone call. Attempted to call her but was unable to reach her.     Cherise Paz RN on 5/21/2025 at 3:38 PM

## 2025-05-21 NOTE — TELEPHONE ENCOUNTER
Reviewed this with RNDiana ROJAS. Diana states that Dr. Zhu will not give that ok, as she needs to weigh the pros and cons with her OB with her blood thinner. This is not something that Dr. Zhu will give her an answer to as she has asked her to speak with her provider ordering these medications. She states that if she would like to speak with Dr. Zhu more about this specifically and directly to Dr. Zhu, the best way to do that is to make an appointment.     Attempted to call Breanna back and let her know this, but she did not answer.       Jessica Huitron RN on 5/21/2025 at 2:07 PM

## 2025-05-22 NOTE — TELEPHONE ENCOUNTER
Patient returned call. She is given message below. Patient will discuss and make a decision on medications with her ObGyn. She reports that she is trying to stay on the lowest possible doses. She continues her blood thinner and is trying to move around more during work rather than sitting all day. Patient denies further questions at this time.    Kaye Byrd RN

## 2025-07-15 ENCOUNTER — LAB (OUTPATIENT)
Dept: INFUSION THERAPY | Facility: HOSPITAL | Age: 50
End: 2025-07-15
Attending: INTERNAL MEDICINE
Payer: COMMERCIAL

## 2025-07-15 ENCOUNTER — ONCOLOGY VISIT (OUTPATIENT)
Dept: ONCOLOGY | Facility: HOSPITAL | Age: 50
End: 2025-07-15
Attending: INTERNAL MEDICINE
Payer: COMMERCIAL

## 2025-07-15 VITALS
RESPIRATION RATE: 16 BRPM | BODY MASS INDEX: 42.31 KG/M2 | HEIGHT: 68 IN | WEIGHT: 279.2 LBS | HEART RATE: 81 BPM | SYSTOLIC BLOOD PRESSURE: 147 MMHG | OXYGEN SATURATION: 96 % | DIASTOLIC BLOOD PRESSURE: 90 MMHG | TEMPERATURE: 96.1 F

## 2025-07-15 DIAGNOSIS — I82.411 ACUTE DEEP VEIN THROMBOSIS (DVT) OF FEMORAL VEIN OF RIGHT LOWER EXTREMITY (H): ICD-10-CM

## 2025-07-15 DIAGNOSIS — R29.898 HEAVY SENSATION OF LOWER EXTREMITY: ICD-10-CM

## 2025-07-15 DIAGNOSIS — I26.02 ACUTE SADDLE PULMONARY EMBOLISM WITH ACUTE COR PULMONALE (H): ICD-10-CM

## 2025-07-15 DIAGNOSIS — I82.411 ACUTE DEEP VEIN THROMBOSIS (DVT) OF FEMORAL VEIN OF RIGHT LOWER EXTREMITY (H): Primary | ICD-10-CM

## 2025-07-15 LAB
ALBUMIN SERPL BCG-MCNC: 4.4 G/DL (ref 3.5–5.2)
ALP SERPL-CCNC: 84 U/L (ref 40–150)
ALT SERPL W P-5'-P-CCNC: 15 U/L (ref 0–50)
ANION GAP SERPL CALCULATED.3IONS-SCNC: 13 MMOL/L (ref 7–15)
APTT PPP: 26 SECONDS (ref 22–38)
AST SERPL W P-5'-P-CCNC: 18 U/L (ref 0–45)
BASOPHILS # BLD AUTO: 0 10E3/UL (ref 0–0.2)
BASOPHILS NFR BLD AUTO: 1 %
BILIRUB SERPL-MCNC: 0.3 MG/DL
BUN SERPL-MCNC: 18.6 MG/DL (ref 6–20)
CALCIUM SERPL-MCNC: 9.6 MG/DL (ref 8.8–10.4)
CHLORIDE SERPL-SCNC: 107 MMOL/L (ref 98–107)
CREAT SERPL-MCNC: 0.58 MG/DL (ref 0.51–0.95)
EGFRCR SERPLBLD CKD-EPI 2021: >90 ML/MIN/1.73M2
EOSINOPHIL # BLD AUTO: 0.1 10E3/UL (ref 0–0.7)
EOSINOPHIL NFR BLD AUTO: 2 %
ERYTHROCYTE [DISTWIDTH] IN BLOOD BY AUTOMATED COUNT: 12.9 % (ref 10–15)
GLUCOSE SERPL-MCNC: 129 MG/DL (ref 70–99)
HCO3 SERPL-SCNC: 18 MMOL/L (ref 22–29)
HCT VFR BLD AUTO: 44.3 % (ref 35–47)
HGB BLD-MCNC: 14.7 G/DL (ref 11.7–15.7)
IMM GRANULOCYTES # BLD: 0 10E3/UL
IMM GRANULOCYTES NFR BLD: 0 %
INR PPP: 1 (ref 0.85–1.15)
LYMPHOCYTES # BLD AUTO: 1.9 10E3/UL (ref 0.8–5.3)
LYMPHOCYTES NFR BLD AUTO: 34 %
MCH RBC QN AUTO: 30.4 PG (ref 26.5–33)
MCHC RBC AUTO-ENTMCNC: 33.2 G/DL (ref 31.5–36.5)
MCV RBC AUTO: 92 FL (ref 78–100)
MONOCYTES # BLD AUTO: 0.3 10E3/UL (ref 0–1.3)
MONOCYTES NFR BLD AUTO: 5 %
NEUTROPHILS # BLD AUTO: 3.2 10E3/UL (ref 1.6–8.3)
NEUTROPHILS NFR BLD AUTO: 57 %
NRBC # BLD AUTO: 0 10E3/UL
NRBC BLD AUTO-RTO: 0 /100
PLATELET # BLD AUTO: 258 10E3/UL (ref 150–450)
POTASSIUM SERPL-SCNC: 4.5 MMOL/L (ref 3.4–5.3)
PROT SERPL-MCNC: 7.4 G/DL (ref 6.4–8.3)
PROTHROMBIN TIME: 13.4 SECONDS (ref 11.8–14.8)
RBC # BLD AUTO: 4.83 10E6/UL (ref 3.8–5.2)
SODIUM SERPL-SCNC: 138 MMOL/L (ref 135–145)
WBC # BLD AUTO: 5.5 10E3/UL (ref 4–11)

## 2025-07-15 PROCEDURE — 85610 PROTHROMBIN TIME: CPT

## 2025-07-15 PROCEDURE — 85730 THROMBOPLASTIN TIME PARTIAL: CPT

## 2025-07-15 PROCEDURE — 99214 OFFICE O/P EST MOD 30 MIN: CPT | Performed by: INTERNAL MEDICINE

## 2025-07-15 PROCEDURE — 85004 AUTOMATED DIFF WBC COUNT: CPT

## 2025-07-15 PROCEDURE — 82310 ASSAY OF CALCIUM: CPT

## 2025-07-15 PROCEDURE — G2211 COMPLEX E/M VISIT ADD ON: HCPCS | Performed by: INTERNAL MEDICINE

## 2025-07-15 PROCEDURE — 36415 COLL VENOUS BLD VENIPUNCTURE: CPT

## 2025-07-15 ASSESSMENT — PAIN SCALES - GENERAL: PAINLEVEL_OUTOF10: NO PAIN (0)

## 2025-07-15 NOTE — PROGRESS NOTES
"Oncology Rooming Note    July 15, 2025 1:21 PM   Breanna Fischer is a 50 year old female who presents for:    Chief Complaint   Patient presents with    Oncology Clinic Visit     Benign neoplasm of transverse colon     Initial Vitals: There were no vitals taken for this visit. Estimated body mass index is 43.18 kg/m  as calculated from the following:    Height as of 12/13/24: 1.715 m (5' 7.52\").    Weight as of 12/13/24: 127 kg (280 lb). There is no height or weight on file to calculate BSA.  Data Unavailable Comment: Data Unavailable   No LMP recorded. Patient is perimenopausal.  Allergies reviewed: Yes  Medications reviewed: Yes    Medications: Medication refills not needed today.  Pharmacy name entered into [employer]:    [id number] [place], [place] - [address] [place] [address] PHARMACY [place], [place] - [address] - [place], [place] - [address] [facility] PHARMACY [place], [place] - [address] [facility]    PHQ9:  Did this patient require a PHQ9?: No      Clinical concerns: follow-up Thaw was notified.      Torie Bergman LPN            "

## 2025-07-15 NOTE — Clinical Note
"7/15/2025      Breanna Fischer  [address] [place] [address]      Dear Colleague,    Thank you for referring your patient, Breanna Fischer, to the [facility] [place]. Please see a copy of my visit note below.    Oncology Rooming Note    July 15, 2025 1:21 PM   Breanna Fischer is a 50 year old female who presents for:    Chief Complaint   Patient presents with    Oncology Clinic Visit     Benign neoplasm of transverse colon     Initial Vitals: There were no vitals taken for this visit. Estimated body mass index is 43.18 kg/m  as calculated from the following:    Height as of 12/13/24: 1.715 m (5' 7.52\").    Weight as of 12/13/24: 127 kg (280 lb). There is no height or weight on file to calculate BSA.  Data Unavailable Comment: Data Unavailable   No LMP recorded. Patient is perimenopausal.  Allergies reviewed: Yes  Medications reviewed: Yes    Medications: Medication refills not needed today.  Pharmacy name entered into [employer]:    [id number] - [place], [place] - [address] [facility] PHARMACY [place], [place] - [address] [place] [address] [facility] PH[facility]AC[facility] - [place], [place] - [address] [facility] PHARMACY [place], [place] - [address] [place] [address]    PHQ9:  Did this patient require a PHQ9?: No      Clinical concerns: follow-up Thaw was notified.      Torie Bergman LPN              [facility] Hematology and Oncology Progress Note    Patient: Breanna Fischer  MRN: [id number]  Date of Service: Jul 15, 2025         Reason for Visit    Chief Complaint   Patient presents with     Oncology Clinic Visit     Benign neoplasm of transverse colon       Assessment and Plan     Cancer Staging   No matching staging information was found for the patient.      ECOG Performance    0 - Independent     Pain  Pain Score: No Pain (0)    #.  History of acute saddle embolus status post mechanical thrombectomy in 3/2024  #.  History of acute occlusive DVT of the external iliac and " "common femoral vein on the right and popliteal vein extending into the posterior tibial and peroneal vein to mid/distal calf of the left lower extremity, status post mechanical thrombectomy of the right lower extremity in 3/2024  #.  Overweight  #.  Sedentary lifestyle     Today, I reviewed the thrombophilia workup.  She does not have factor V Leiden or prothrombin gene mutation.  Antiphospholipid panel was negative.  Antithrombin III level were normal.   Given the history of life-threatening nature of DVT, she is recommended extended (no stop date) anticoagulation therapy.  She tolerates Eliquis fairly well at this point.  She agreed with the plan.  I advised her to watch for signs and symptoms of bleeding.   I also recommended her to update on age-appropriate cancer screening.   Recommended annual follow-up for long-term use of anticoagulation therapy.      Encounter Diagnoses:    Problem List Items Addressed This Visit    None         CC: Luciana Franco MD   ______________________________________________________________________________    History of Present Illness    Ms. Breanna Fischer presented herself today for follow-up acute VTE.  She is currently on Eliquis.  She does not have any intolerable side effects.  No bleeding.    Review of systems  Apart from describing in HPI, the remainder of comprehensive ROS was negative.    Past History    Past Medical History:   Diagnosis Date     Celiac disease      Hypertension        Past Surgical History:   Procedure Laterality Date     ABDOMEN SURGERY  1999    Gall bladder     CHOLECYSTECTOMY       ENT SURGERY       IR [facility] VENOUS THROMB  3/21/2024     IR PULMONARY ANGIOGRAM BILATERAL  3/20/2024       Physical Exam    BP (!) 147/90 (BP Location: Left arm, Patient Position: Sitting, Cuff Size: Adult Large)   Pulse 81   Temp (!) 96.1  F (35.6  C) (Tympanic)   Resp 16   Ht 1.727 m (5' 8\")   Wt 126.6 kg (279 lb 3.2 oz)   SpO2 96%   BMI 42.45 kg/m      General: alert, " awake, not in acute distress  HEENT: Head: Normal, normocephalic, atraumatic.  Eye: Normal external eye, conjunctiva, lids cornea, CARINA.  Nose: Normal external nose, mucus membranes and septum.  Pharynx: Normal buccal mucosa. Normal pharynx.  Neck / Thyroid: Supple, no masses, nodes, nodules or enlargement.  Lymphatics: No abnormally enlarged lymph nodes.  Chest: Normal chest wall and respirations. Clear to auscultation.  Heart: S1 S2 RRR, no murmur.   Abdomen: abdomen is soft without significant tenderness, masses, organomegaly or guarding  Extremities: normal strength, tone, and muscle mass  Skin: normal. no rash or abnormalities  CNS: non focal.    Lab Results    Recent Results (from the past 240 hours)   Partial thromboplastin time    Collection Time: 07/15/25  1:20 PM   Result Value Ref Range    aPTT 26 22 - 38 Seconds   INR    Collection Time: 07/15/25  1:20 PM   Result Value Ref Range    INR 1.00 0.85 - 1.15    PT 13.4 11.8 - 14.8 Seconds   Comprehensive metabolic panel    Collection Time: 07/15/25  1:20 PM   Result Value Ref Range    Sodium 138 135 - 145 mmol/L    Potassium 4.5 3.4 - 5.3 mmol/L    Carbon Dioxide (CO2) 18 (L) 22 - 29 mmol/L    Anion Gap 13 7 - 15 mmol/L    Urea Nitrogen 18.6 6.0 - 20.0 mg/dL    Creatinine 0.58 0.51 - 0.95 mg/dL    GFR Estimate >90 >60 mL/min/1.73m2    Calcium 9.6 8.8 - 10.4 mg/dL    Chloride 107 98 - 107 mmol/L    Glucose 129 (H) 70 - 99 mg/dL    Alkaline Phosphatase 84 40 - 150 U/L    AST 18 0 - 45 U/L    ALT 15 0 - 50 U/L    Protein Total 7.4 6.4 - 8.3 g/dL    Albumin 4.4 3.5 - 5.2 g/dL    Bilirubin Total 0.3 <=1.2 mg/dL   CBC with platelets and differential    Collection Time: 07/15/25  1:20 PM   Result Value Ref Range    WBC Count 5.5 4.0 - 11.0 10e3/uL    RBC Count 4.83 3.80 - 5.20 10e6/uL    Hemoglobin 14.7 11.7 - 15.7 g/dL    Hematocrit 44.3 35.0 - 47.0 %    MCV 92 78 - 100 fL    MCH 30.4 26.5 - 33.0 pg    MCHC 33.2 31.5 - 36.5 g/dL    RDW 12.9 10.0 - 15.0 %     Platelet Count 258 150 - 450 10e3/uL    % Neutrophils 57 %    % Lymphocytes 34 %    % Monocytes 5 %    % Eosinophils 2 %    % Basophils 1 %    % Immature Granulocytes 0 %    NRBCs per 100 WBC 0 <1 /100    Absolute Neutrophils 3.2 1.6 - 8.3 10e3/uL    Absolute Lymphocytes 1.9 0.8 - 5.3 10e3/uL    Absolute Monocytes 0.3 0.0 - 1.3 10e3/uL    Absolute Eosinophils 0.1 0.0 - 0.7 10e3/uL    Absolute Basophils 0.0 0.0 - 0.2 10e3/uL    Absolute Immature Granulocytes 0.0 <=0.4 10e3/uL    Absolute NRBCs 0.0 10e3/uL         Imaging    No results found.    32 minutes spent on the date of the encounter doing chart review, history and exam, documentation, communication of the treatment plan with the care team and further activities as noted above.    Signed by: Arianna Zhu MD      Again, thank you for allowing me to participate in the care of your patient.        Sincerely,        Arianna Zhu MD    Electronically signed

## 2025-07-15 NOTE — PROGRESS NOTES
[facility] Hematology and Oncology Progress Note    Patient: Breanna Fischer  MRN: [id number]  Date of Service: Jul 15, 2025         Reason for Visit    Chief Complaint   Patient presents with    Oncology Clinic Visit     Benign neoplasm of transverse colon       Assessment and Plan     Cancer Staging   No matching staging information was found for the patient.      ECOG Performance    0 - Independent     Pain  Pain Score: No Pain (0)    #.  History of acute saddle embolus status post mechanical thrombectomy in 3/2024  #.  History of acute occlusive DVT of the external iliac and common femoral vein on the right and popliteal vein extending into the posterior tibial and peroneal vein to mid/distal calf of the left lower extremity, status post mechanical thrombectomy of the right lower extremity in 3/2024  #.  Overweight  #.  Sedentary lifestyle     Today, I reviewed the thrombophilia workup.  She does not have factor V Leiden or prothrombin gene mutation.  Antiphospholipid panel was negative.  Antithrombin III level were normal.   Given the history of life-threatening nature of DVT, she is recommended extended (no stop date) anticoagulation therapy.  She tolerates Eliquis fairly well at this point.  She agreed with the plan.  I advised her to watch for signs and symptoms of bleeding.   I also recommended her to update on age-appropriate cancer screening.   Recommended annual follow-up for long-term use of anticoagulation therapy.      Encounter Diagnoses:    Problem List Items Addressed This Visit       Acute deep vein thrombosis (DVT) of femoral vein of right lower extremity (H) - Primary    Relevant Orders    Vascular Surgery Referral     Other Visit Diagnoses         Heavy sensation of lower extremity        Relevant Orders    Vascular Surgery Referral               CC: Luciana Franco MD   ______________________________________________________________________________    History of Present Illness    Ms. Carlos  "LAYLA Fischer presented herself today for follow-up acute VTE.  She is currently on Eliquis.  She does not have any intolerable side effects.  No bleeding.    Review of systems  Apart from describing in HPI, the remainder of comprehensive ROS was negative.    Past History    Past Medical History:   Diagnosis Date    Celiac disease     Hypertension        Past Surgical History:   Procedure Laterality Date    ABDOMEN SURGERY  1999    Gall bladder    CHOLECYSTECTOMY      [facility]    IR PULMONARY ANGIOGRAM BILATERAL  3/20/2024       Physical Exam    BP (!) 147/90 (BP Location: Left arm, Patient Position: Sitting, Cuff Size: Adult Large)   Pulse 81   Temp (!) 96.1  F (35.6  C) (Tympanic)   Resp 16   Ht 1.727 m (5' 8\")   Wt 126.6 kg (279 lb 3.2 oz)   SpO2 96%   BMI 42.45 kg/m      General: alert, awake, not in acute distress  HEENT: Head: Normal, normocephalic, atraumatic.  Eye: Normal external eye, conjunctiva, lids cornea, CARINA.  Nose: Normal external nose, mucus membranes and septum.  Pharynx: Normal buccal mucosa. Normal pharynx.  Neck / Thyroid: Supple, no masses, nodes, nodules or enlargement.  Lymphatics: No abnormally enlarged lymph nodes.  Chest: Normal chest wall and respirations. Clear to auscultation.  Heart: S1 S2 RRR, no murmur.   Abdomen: abdomen is soft without significant tenderness, masses, organomegaly or guarding  Extremities: normal strength, tone, and muscle mass  Skin: normal. no rash or abnormalities  CNS: non focal.    Lab Results    Recent Results (from the past 240 hours)   Partial thromboplastin time    Collection Time: 07/15/25  1:20 PM   Result Value Ref Range    aPTT 26 22 - 38 Seconds   INR    Collection Time: 07/15/25  1:20 PM   Result Value Ref Range    INR 1.00 0.85 - 1.15    PT 13.4 11.8 - 14.8 Seconds   Comprehensive metabolic panel    Collection Time: 07/15/25  1:20 PM   Result Value Ref Range    Sodium 138 135 - 145 mmol/L    Potassium 4.5 " 3.4 - 5.3 mmol/L    Carbon Dioxide (CO2) 18 (L) 22 - 29 mmol/L    Anion Gap 13 7 - 15 mmol/L    Urea Nitrogen 18.6 6.0 - 20.0 mg/dL    Creatinine 0.58 0.51 - 0.95 mg/dL    GFR Estimate >90 >60 mL/min/1.73m2    Calcium 9.6 8.8 - 10.4 mg/dL    Chloride 107 98 - 107 mmol/L    Glucose 129 (H) 70 - 99 mg/dL    Alkaline Phosphatase 84 40 - 150 U/L    AST 18 0 - 45 U/L    ALT 15 0 - 50 U/L    Protein Total 7.4 6.4 - 8.3 g/dL    Albumin 4.4 3.5 - 5.2 g/dL    Bilirubin Total 0.3 <=1.2 mg/dL   CBC with platelets and differential    Collection Time: 07/15/25  1:20 PM   Result Value Ref Range    WBC Count 5.5 4.0 - 11.0 10e3/uL    RBC Count 4.83 3.80 - 5.20 10e6/uL    Hemoglobin 14.7 11.7 - 15.7 g/dL    Hematocrit 44.3 35.0 - 47.0 %    MCV 92 78 - 100 fL    MCH 30.4 26.5 - 33.0 pg    MCHC 33.2 31.5 - 36.5 g/dL    RDW 12.9 10.0 - 15.0 %    Platelet Count 258 150 - 450 10e3/uL    % Neutrophils 57 %    % Lymphocytes 34 %    % Monocytes 5 %    % Eosinophils 2 %    % Basophils 1 %    % Immature Granulocytes 0 %    NRBCs per 100 WBC 0 <1 /100    Absolute Neutrophils 3.2 1.6 - 8.3 10e3/uL    Absolute Lymphocytes 1.9 0.8 - 5.3 10e3/uL    Absolute Monocytes 0.3 0.0 - 1.3 10e3/uL    Absolute Eosinophils 0.1 0.0 - 0.7 10e3/uL    Absolute Basophils 0.0 0.0 - 0.2 10e3/uL    Absolute Immature Granulocytes 0.0 <=0.4 10e3/uL    Absolute NRBCs 0.0 10e3/uL         Imaging    No results found.    32 minutes spent on the date of the encounter doing chart review, history and exam, documentation, communication of the treatment plan with the care team and further activities as noted above.    Signed by: Arianna Zhu MD   hours).        Imaging    No results found.    The longitudinal plan of care for the diagnosis(es)/condition(s) as documented were addressed during this visit. Due to the added complexity in care, I will continue to support Breanna in the subsequent management and with ongoing continuity of care.     30 minutes spent by me on the date of the encounter doing chart review, history and exam, documentation and further activities as noted above.    Consent was obtained from the patient to use an AI documentation tool in the creation of this note.    Signed by: Arianna Zhu MD

## 2025-07-15 NOTE — LETTER
MD    Again, thank you for allowing me to participate in the care of your patient.        Sincerely,        Arianna Zhu MD    Electronically signed

## 2025-07-16 ENCOUNTER — TELEPHONE (OUTPATIENT)
Dept: VASCULAR SURGERY | Facility: CLINIC | Age: 50
End: 2025-07-16
Payer: COMMERCIAL

## 2025-07-17 DIAGNOSIS — I10 BENIGN ESSENTIAL HYPERTENSION: ICD-10-CM

## 2025-07-19 RX ORDER — LOSARTAN POTASSIUM 25 MG/1
25 TABLET ORAL DAILY
Qty: 30 TABLET | Refills: 0 | Status: SHIPPED | OUTPATIENT
Start: 2025-07-19

## 2025-07-19 NOTE — TELEPHONE ENCOUNTER
Refill done.    Pt to keep up scheduled appt on 7/31/25 for further evaluation given uncontrolled blood pressures     Thank you  Luciana Franco MD on 7/19/2025

## 2025-07-21 ENCOUNTER — TRANSFERRED RECORDS (OUTPATIENT)
Dept: HEALTH INFORMATION MANAGEMENT | Facility: CLINIC | Age: 50
End: 2025-07-21
Payer: COMMERCIAL

## 2025-07-24 ENCOUNTER — TELEPHONE (OUTPATIENT)
Dept: FAMILY MEDICINE | Facility: CLINIC | Age: 50
End: 2025-07-24
Payer: COMMERCIAL

## 2025-07-24 NOTE — TELEPHONE ENCOUNTER
[facility]  Fax:[phone]  Spec: [phone]  MO: [phone]

## 2025-07-26 NOTE — TELEPHONE ENCOUNTER
PA Initiation    Medication: ZEPBOUND 2.5 MG/0.5ML SC SOAJ  Insurance Company: [employer] - Phone [phone] Fax [phone]  Pharmacy Filling the Rx: [facility] - [place], [place] - [facility] [address]   Filling Pharmacy Phone: [phone]  Filling Pharmacy Fax: [phone]  Start Date: 7/26/2025     Thank you,     Kt Virgen CPhT  Pharmacy Clinic [facility]  [email or web address]   Phone: [phone]  Fax: [phone]

## 2025-07-26 NOTE — ORAL ONC MGMT
PRIOR AUTHORIZATION DENIED    Medication: ZEPBOUND 2.5 MG/0.5ML SC SOAJ  Insurance Company: [employer] - Phone [phone] Fax [phone]  Denial Date: 7/26/2025  Denial Reason(s): Plan exclusion  Appeal Information: N/A - certain criteria have to be met  Patient Notified: No           Thank you,     Kt Virgen [facility]  Pharmacy Clinic [facility]  [email or web address]   Phone: [phone]  Fax: [phone]

## 2025-07-26 NOTE — TELEPHONE ENCOUNTER
PRIOR AUTHORIZATION DENIED    Medication: ZEPBOUND 2.5 MG/0.5ML SC SOAJ  Insurance Company: [employer] - Phone [phone] Fax [phone]  Denial Date: 7/26/2025  Denial Reason(s): Plan exclusion  Appeal Information: N/A - certain criteria have to be met  Patient Notified: No           Thank you,     Kt Virgen [facility]  Pharmacy Clinic [facility]  [email or web address]   Phone: [phone]  Fax: [phone]

## 2025-07-26 NOTE — TELEPHONE ENCOUNTER
Please see below. This cannot be processed until all of the criteria below are met:        Thank you,     Kt Virgen CPhT  Pharmacy Clinic [facility]  [email or web address]   Phone: [phone]  Fax: [phone]

## 2025-07-27 SDOH — HEALTH STABILITY: PHYSICAL HEALTH: ON AVERAGE, HOW MANY DAYS PER WEEK DO YOU ENGAGE IN MODERATE TO STRENUOUS EXERCISE (LIKE A BRISK WALK)?: 4 DAYS

## 2025-07-27 SDOH — HEALTH STABILITY: PHYSICAL HEALTH: ON AVERAGE, HOW MANY MINUTES DO YOU ENGAGE IN EXERCISE AT THIS LEVEL?: 30 MIN

## 2025-07-27 ASSESSMENT — SOCIAL DETERMINANTS OF HEALTH (SDOH): HOW OFTEN DO YOU GET TOGETHER WITH FRIENDS OR RELATIVES?: TWICE A WEEK

## 2025-07-28 NOTE — TELEPHONE ENCOUNTER
Sent [employer] message and [facility] TCB after talking with her insurance company.    Estefany Lehman on 7/28/2025 at 5:05 PM

## 2025-07-28 NOTE — TELEPHONE ENCOUNTER
Please let the pt know insurance non coverage of Zepbound.    Advise her to check with her insurance plan on covered alternatives for weight loss medications so we can prescribe accordingly.     Thank you  Luciana Franco MD on 7/28/2025 at 11:11 AM

## 2025-07-31 ENCOUNTER — OFFICE VISIT (OUTPATIENT)
Dept: FAMILY MEDICINE | Facility: CLINIC | Age: 50
End: 2025-07-31
Attending: INTERNAL MEDICINE
Payer: COMMERCIAL

## 2025-07-31 VITALS
HEIGHT: 67 IN | OXYGEN SATURATION: 98 % | HEART RATE: 70 BPM | WEIGHT: 281 LBS | TEMPERATURE: 98.2 F | RESPIRATION RATE: 18 BRPM | BODY MASS INDEX: 44.1 KG/M2 | DIASTOLIC BLOOD PRESSURE: 100 MMHG | SYSTOLIC BLOOD PRESSURE: 136 MMHG

## 2025-07-31 DIAGNOSIS — K90.0 CELIAC DISEASE: ICD-10-CM

## 2025-07-31 DIAGNOSIS — E66.813 CLASS 3 SEVERE OBESITY DUE TO EXCESS CALORIES WITH BODY MASS INDEX (BMI) OF 40.0 TO 44.9 IN ADULT (H): ICD-10-CM

## 2025-07-31 DIAGNOSIS — I26.02 CHRONIC SADDLE PULMONARY EMBOLISM WITH ACUTE COR PULMONALE (H): ICD-10-CM

## 2025-07-31 DIAGNOSIS — R73.03 PREDIABETES: ICD-10-CM

## 2025-07-31 DIAGNOSIS — G37.9 DEMYELINATING DISEASE OF CENTRAL NERVOUS SYSTEM (H): ICD-10-CM

## 2025-07-31 DIAGNOSIS — R79.89 LOW VITAMIN D LEVEL: ICD-10-CM

## 2025-07-31 DIAGNOSIS — Z23 NEED FOR VACCINATION: ICD-10-CM
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
## 2025-07-31 DIAGNOSIS — Z00.00 ROUTINE GENERAL MEDICAL EXAMINATION AT A HEALTH CARE FACILITY: Primary | ICD-10-CM

## 2025-07-31 DIAGNOSIS — I27.82 CHRONIC SADDLE PULMONARY EMBOLISM WITH ACUTE COR PULMONALE (H): ICD-10-CM

## 2025-07-31 DIAGNOSIS — E66.01 MORBID OBESITY (H): ICD-10-CM

## 2025-07-31 DIAGNOSIS — S80.861S TICK BITE OF RIGHT LOWER LEG, SEQUELA: ICD-10-CM

## 2025-07-31 DIAGNOSIS — R53.83 FATIGUE, UNSPECIFIED TYPE: ICD-10-CM

## 2025-07-31 DIAGNOSIS — R06.83 SNORING: ICD-10-CM

## 2025-07-31 DIAGNOSIS — G44.219 EPISODIC TENSION-TYPE HEADACHE, NOT INTRACTABLE: ICD-10-CM

## 2025-07-31 DIAGNOSIS — I10 BENIGN ESSENTIAL HYPERTENSION: ICD-10-CM

## 2025-07-31 LAB
CHOLEST SERPL-MCNC: 162 MG/DL
EST. AVERAGE GLUCOSE BLD GHB EST-MCNC: 117 MG/DL
FASTING STATUS PATIENT QL REPORTED: ABNORMAL
HBA1C MFR BLD: 5.7 % (ref 0–5.6)
HDLC SERPL-MCNC: 43 MG/DL
LDLC SERPL CALC-MCNC: 104 MG/DL
NONHDLC SERPL-MCNC: 119 MG/DL
TRIGL SERPL-MCNC: 73 MG/DL
TSH SERPL DL<=0.005 MIU/L-ACNC: 1.29 UIU/ML (ref 0.3–4.2)
VIT B12 SERPL-MCNC: 442 PG/ML (ref 232–1245)
VIT D+METAB SERPL-MCNC: 18 NG/ML (ref 20–50)

## 2025-07-31 RX ORDER — LOSARTAN POTASSIUM 50 MG/1
50 TABLET ORAL DAILY
Qty: 90 TABLET | Refills: 1 | Status: SHIPPED | OUTPATIENT
Start: 2025-07-31

## 2025-07-31 RX ORDER — LOSARTAN POTASSIUM 25 MG/1
25 TABLET ORAL DAILY
Qty: 30 TABLET | Refills: 0 | Status: CANCELLED | OUTPATIENT
Start: 2025-07-31

## 2025-07-31 ASSESSMENT — PAIN SCALES - GENERAL: PAINLEVEL_OUTOF10: NO PAIN (0)

## 2025-07-31 NOTE — PROGRESS NOTES
Preventive Care Visit  [facility] ANGELA Franco MD, Internal Medicine  Jul 31, 2025          Assessment and Plan      1. Routine general medical examination at a health care facility (Primary)    Last seen patient in April 2025 - a video visit on claudication of both lower extremities, does have medical conditions of chronic pulmonary saddle embolism, exertional dyspnea and peripheral edema. Reviewed recent neurology visit     Recent oncology visit on July 2025, patient continues to be on Eliquis negative for DVT and acquired thrombophilia workup.     Does have postthrombotic syndrome of venous insufficiency status post acute DVT thrombectomy with ongoing symptoms of heaviness and weakness on the right lower extremity.    OBGYN - Dr. Daley - [facility]. ( Upcoming PAP smear on 8/4/2025      - PRIMARY CARE FOLLOW-UP SCHEDULING  - TDAP 10-64Y (ADACEL,BOOSTRIX)  - REVIEW OF HEALTH MAINTENANCE PROTOCOL ORDERS  - losartan (COZAAR) 50 MG tablet; Take 1 tablet (50 mg) by mouth daily.  Dispense: 90 tablet; Refill: 1  - TSH with free T4 reflex; Future  - Hemoglobin A1c; Future  - Lipid panel reflex to direct LDL Fasting; Future  - Vitamin D deficiency screening; Future  - LYME DISEASE TOTAL ANTIBODIES WITH REFLEX TO CONFIRMATION; Future  - Vitamin B12; Future  - TSH with free T4 reflex  - Hemoglobin A1c  - Lipid panel reflex to direct LDL Fasting  - Vitamin D deficiency screening  - Vitamin B12    2. 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  Continue current oral anticoagulation indefinitely as per heme oncology recommendations.    3. Demyelinating disease of central nervous system (H)  Chronic problem, continue surveillance MRIs as managed by neurology.  Reviewed the records and discussed with the patient.  -Reviewed recent neurology note on July 18, 2025 with status post lumbar puncture as well done by the neurologist for ruling out  demyelinating disease given patient's symptoms of headaches, - Lumbar puncture including oligoclonal bands were negative patient also had additional lab work that included normal angiotensin-converting enzyme, DANTE, folate, MMA, RPR, vitamin E, B12, copper.      4. Class 3 severe obesity due to excess calories with body mass index (BMI) of 40.0 to 44.9 in adult (H)  5. Morbid obesity (H)  Chronic problem, uncontrolled.  GLP-1's was not covered by patient insurance, she is following strict diet and lifestyle modifications which is working for her.  - TSH with free T4 reflex; Future  - TSH with free T4 reflex    6. Benign essential hypertension  Chronic problem, uncontrolled in spite of current losartan 25 mg daily.  Will increase the dose to 50 mg daily as per shared decision.  AVS given below with follow-up instructions.  - losartan (COZAAR) 50 MG tablet; Take 1 tablet (50 mg) by mouth daily.  Dispense: 90 tablet; Refill: 1    7. Episodic tension-type headache, not intractable  Chronic problem, following neurology given the Above demyelinating disease which is being investigated by neurology.  Continue to follow neurology recommendations.     8. Prediabetes  - Hemoglobin A1c; Future  - Hemoglobin A1c    9. Celiac disease  Chronic stable, continue to avoid gluten which will help in further improvement.    10. Low vitamin D level  - Vitamin D deficiency screening; Future  - Vitamin D deficiency screening    11. Fatigue, unspecified type  Ongoing problem, uncontrolled.  Patient requesting for all the pertinent workup needed for this.  Will do as requested.    - Vitamin D deficiency screening; Future  - Vitamin B12; Future  - Vitamin D deficiency screening  - Vitamin B12    12. Tick bite of right lower leg, sequela  New problem which patient endorses that she has been exposed to ticks in the past for which she might be having above fatigue symptoms ongoing.  Requesting to check for labs, will do as requested.  - LYME  DISEASE TOTAL ANTIBODIES WITH REFLEX TO CONFIRMATION; Future  - LYME DISEASE TOTAL ANTIBODIES WITH REFLEX TO CONFIRMATION    13. Snoring    New problem, will consider checking for sleep apnea before which could be a possible cause of uncontrolled blood pressure ago.  Patient understood well plan.    - Adult Sleep Eval & Management  Referral; Future    14. Need for vaccination  - TDAP 10-64Y (ADACEL,BOOSTRIX)  - REVIEW OF HEALTH MAINTENANCE PROTOCOL ORDERS         The longitudinal plan of care for the diagnosis(es)/condition(s) as documented were addressed during this visit. Due to the added complexity in care, I will continue to support Breanna in the subsequent management and with ongoing continuity of care.        Please note that this note consists of symbols derived from keyboarding, dictation and/or voice recognition software. As a result, there may be errors in the script that have gone undetected. Please consider this when interpreting information found in this chart.    Patient Instructions   As discussed, please do fasting labs placed - fasting LAB only appt.     Refills will be taken care     Given your uncontrolled blood pressure, increase the dose of your losartan to 50 mg daily.  Placed referral to sleep study for making sure this is well-controlled.    Placed Lyme's titers as requested.      Follow-up with neurology, heme oncology on recommendations for conditions as managed by them.    ======================      Patient Education  Preventive Care Advice   This is general advice we often give to help people stay healthy. Your care team may have specific advice just for you. Please talk to your care team about your own preventive care needs.  Lifestyle  Exercise at least 150 minutes each week (30 minutes a day, 5 days a week).  Do muscle strengthening activities 2 days a week. These help control your weight and prevent disease.  No smoking.  Wear sunscreen to prevent skin cancer.  Take time with  "family and friends.  Have your home tested for radon every 2 to 5 years. Radon is a colorless, odorless gas that can harm your lungs. To learn more, go to www.health.[facility].mn.us and search for \"Radon in Homes.\"  Keep guns unloaded and locked up in a safe place like a safe or gun vault, or, use a gun lock and hide the keys. Always lock away bullets separately. To learn more, visit [facility].[place].gov and search for \"safe gun storage.\"  Nutrition  Eat 5 or more servings of fruits and vegetables each day.  Try wheat bread, brown rice and whole grain pasta (instead of white bread, rice, and pasta).  Get enough calcium and vitamin D. Check the label on foods and aim for 100% of the RDA (recommended daily allowance).  Regular exams  Have a dental exam and cleaning every 6 months.  Older adults: Ask your care team how often to have memory testing.  See your health care team every year to talk about:  Any changes in your health.  Any medicines your care team has prescribed.  Preventive care, family planning, and ways to prevent chronic diseases.  Shots (vaccines)   HPV shots (up to age 26), if you've never had them before.  Hepatitis B shots (up to age 59), if you've never had them before.  COVID-19 shot: Get this shot when it's due.  Flu shot: Get a flu shot every year.  Tetanus shot: Get a tetanus shot every 10 years.  Pneumococcal, hepatitis A, and RSV shots: Ask your care team if you need these based on your risk.  Shingles shot (for age 50 and up).  General health tests  Diabetes screening:  Starting at age 35, Get screened for diabetes at least every 3 years.  If you are younger than age 35, ask your care team if you should be screened for diabetes.  Cholesterol test: At age 39, start having a cholesterol test every 5 years, or more often if advised.  Bone density scan (DEXA): At age 50, ask your care team if you should have this scan for osteoporosis (brittle bones).  Hepatitis C: Get tested at least once in your " life.  Abdominal aortic aneurysm screening: Talk to your doctor about having this screening if you:  Have ever smoked; and  Are biologically male; and  Are between the ages of 65 and 75.  STIs (sexually transmitted infections)  Before age 24: Ask your care team if you should be screened for STIs.  After age 24: Get screened for STIs if you're at risk. You are at risk for STIs (including HIV) if:  You are sexually active with more than one person.  You don't use condoms every time.  You or a partner was diagnosed with a sexually transmitted infection.  If you are at risk for HIV, ask about PrEP medicine to prevent HIV.  Get tested for HIV at least once in your life, whether you are at risk for HIV or not.  Cancer screening tests  Cervical cancer screening: If you have a cervix, begin getting regular cervical cancer screening tests at age 21. Most people who have regular screenings with normal results can stop after age 65. Talk about this with your provider.  Breast cancer scan (mammogram): If you've ever had breasts, begin having regular mammograms starting at age 40. This is a scan to check for breast cancer.  Colon cancer screening: It is important to start screening for colon cancer at age 45.  Have a colonoscopy test every 10 years (or more often if you're at risk) Or, ask your provider about stool tests like a FIT test every year or Cologuard test every 3 years.  To learn more about your testing options, visit: www.[employer] [place]/423969.pdf.  For help making a decision, visit: kamilah/wc93871.  Prostate cancer screening test: If you have a prostate and are age 55 to 69, ask your provider if you would benefit from a yearly prostate cancer screening test.  Lung cancer screening: If you are a current or former smoker age 50 to 80, ask your care team if ongoing lung cancer screenings are right for you.    For informational purposes only. Not to replace the advice of your health care provider. Copyright   2023 [facility]. All rights reserved. Clinically reviewed by the [facility]. [employer] [id number] - REV 6/25.  Preventing Falls: Care Instructions  Injuries and health problems such as trouble walking or poor eyesight can increase your risk of falling. So can some medicines. But there are things you can do to help prevent falls. You can exercise to get stronger. You can also arrange your home to make it safer.    Talk to your doctor about the medicines you take. Ask if any of them increase the risk of falls and whether they can be changed or stopped.   Try to exercise regularly. It can help improve your strength and balance. This can help lower your risk of falling.         Practice fall safety and prevention.   Wear low-heeled shoes that fit well and give your feet good support. Talk to your doctor if you have foot problems that make this hard.  Carry a cellphone or wear a medical alert device that you can use to call for help.  Use stepladders instead of chairs to reach high objects. Don't climb if you're at risk for falls. Ask for help, if needed.  Wear the correct eyeglasses, if you need them.        Make your home safer.   Remove rugs, cords, clutter, and furniture from walkways.  Keep your house well lit. Use night-lights in hallways and bathrooms.  Install and use sturdy handrails on stairways.  Wear nonskid footwear, even inside. Don't walk barefoot or in socks without shoes.        Be safe outside.   Use handrails, curb cuts, and ramps whenever possible.  Keep your hands free by using a shoulder bag or backpack.  Try to walk in well-lit areas. Watch out for uneven ground, changes in pavement, and debris.  Be careful in the winter. Walk on the grass or gravel when sidewalks are slippery. Use de-icer on steps and walkways. Add non-slip devices to shoes.    Put grab bars and nonskid mats in your shower or tub and near the toilet. Try to use a shower chair or bath bench when bathing.  "  Get into a tub or shower by putting in your weaker leg first. Get out with your strong side first. Have a phone or medical alert device in the bathroom with you.   Where can you learn more?  Go to https://www.[employer].net/patiented  Enter G117 in the search box to learn more about \"Preventing Falls: Care Instructions.\"  Current as of: July 31, 2024  Content Version: 14.5    7099-6513 [employer].   Care instructions adapted under license by your healthcare professional. If you have questions about a medical condition or this instruction, always ask your healthcare professional. [employer] disclaims any warranty or liability for your use of this information.         Return in about 3 months (around 10/31/2025), or if symptoms worsen or fail to improve, for BP Recheck, inclinc appointment.    MD SAMUEL [facility] ANGELA Carlos is a 50 year old, presenting for the following:  Physical (Fasting )        7/31/2025     8:32 AM   Additional Questions   Roomed by Arnoldo LLOYD    Hypertension Follow-up    Do you check your blood pressure regularly outside of the clinic? No   Are you following a low salt diet? No  Are your blood pressures ever more than 140 on the top number (systolic) OR more   than 90 on the bottom number (diastolic), for example 140/90? N/A    BP Readings from Last 2 Encounters:   07/31/25 (!) 136/100   07/18/25 (!) 155/101       Advance Care Planning    Patient states has Health Care Directive and will send to [employer].        7/27/2025   General Health   How would you rate your overall physical health? Good   Feel stress (tense, anxious, or unable to sleep) Only a little   (!) STRESS CONCERN      7/27/2025   Nutrition   Three or more servings of calcium each day? Yes   Diet: Gluten-free/reduced   How many servings of fruit and vegetables per day? (!) 2-3   How many sweetened beverages each day? 0-1         " 7/27/2025   Exercise   Days per week of moderate/strenous exercise 4 days   Average minutes spent exercising at this level 30 min         7/27/2025   Social Factors   Frequency of gathering with friends or relatives Twice a week   Worry food won't last until get money to buy more No   Food not last or not have enough money for food? No   Do you have housing? (Housing is defined as stable permanent housing and does not include staying outside in a car, in a tent, in an abandoned building, in an overnight shelter, or couch-surfing.) Yes   Are you worried about losing your housing? No   Lack of transportation? No   Unable to get utilities (heat,electricity)? No         7/31/2025   Fall Risk   Gait Speed Test (Document in seconds) 5.12   Gait Speed Test Interpretation Greater than 5.01 seconds - ABNORMAL          7/27/2025   Dental   Dentist two times every year? Yes         Today's PHQ-2 Score:       7/31/2025     8:23 AM   PHQ-2 ( 1999 [employer])   Q1: Little interest or pleasure in doing things 0   Q2: Feeling down, depressed or hopeless 0   PHQ-2 Score 0    Q1: Little interest or pleasure in doing things Not at all   Q2: Feeling down, depressed or hopeless Not at all   PHQ-2 Score 0       Patient-reported           7/27/2025   Substance Use   Alcohol more than 3/day or more than 7/wk No   Do you use any other substances recreationally? No     Social History     Tobacco Use    Smoking status: Never    Smokeless tobacco: Never   Vaping Use    Vaping status: Never Used   Substance Use Topics    Alcohol use: No    Drug use: No           11/12/2024   LAST FHS-7 RESULTS   1st degree relative breast or ovarian cancer No   Any relative bilateral breast cancer No   Any male have breast cancer No   Any ONE woman have BOTH breast AND ovarian cancer No   Any woman with breast cancer before 50yrs No   2 or more relatives with breast AND/OR ovarian cancer No   2 or more relatives with breast AND/OR bowel cancer No        Mammogram  Screening - Annual screen due to greater than 20% lifetime risk as estimated by Breast Cancer Risk Calculator        2025   STI Screening   New sexual partner(s) since last STI/HIV test? No     History of abnormal Pap smear: No - age 30- 64 PAP with HPV every 5 years recommended        Latest Ref Rng & Units 2022     9:21 AM   PAP / HPV   PAP  Negative for Intraepithelial Lesion or Malignancy (NILM)    HPV 16 DNA Negative Negative    HPV 18 DNA Negative Negative    Other HR HPV Negative Negative      ASCVD Risk   The 10-year ASCVD risk score (Alfa CARLOS, et al., 2019) is: 2.3%    Values used to calculate the score:      Age: 50 years      Sex: Female      Is Non- : No      Diabetic: No      Tobacco smoker: No      Systolic Blood Pressure: 136 mmHg      Is BP treated: Yes      HDL Cholesterol: 43 mg/dL      Total Cholesterol: 162 mg/dL      Reviewed and updated as needed this visit by Provider   Tobacco  Allergies  Meds  Problems  Med Hx  Surg Hx  Fam Hx            Past Medical History:   Diagnosis Date    Celiac disease     Hypertension      Past Surgical History:   Procedure Laterality Date    ABDOMEN SURGERY      Gall bladder    CHOLECYSTECTOMY      ENT SURGERY      IR MECH VENOUS THROMB  3/21/2024    IR PULMONARY ANGIOGRAM BILATERAL  3/20/2024     OB History    Para Term  AB Living   3 0 0 0 0 3   SAB IAB Ectopic Multiple Live Births   0 0 0 0 0      # Outcome Date GA Lbr Yvon/2nd Weight Sex Type Anes PTL Lv   3             2             1               Lab work is in process  Labs reviewed in EPIC  BP Readings from Last 3 Encounters:   25 (!) 136/100   25 (!) 155/101   07/15/25 (!) 147/90    Wt Readings from Last 3 Encounters:   25 127.5 kg (281 lb)   25 126.6 kg (279 lb)   07/15/25 126.6 kg (279 lb 3.2 oz)                  Patient Active Problem List   Diagnosis    Morbid obesity (H)    Prediabetes     Low vitamin D level    Benign essential hypertension    Celiac disease    Peripheral edema    Chronic saddle pulmonary embolism with acute cor pulmonale (H)    Acute deep vein thrombosis (DVT) of femoral vein of right lower extremity (H)    Benign neoplasm of transverse colon    Demyelinating disease of central nervous system (H)     Past Surgical History:   Procedure Laterality Date    ABDOMEN SURGERY  1999    Gall bladder    CHOLECYSTECTOMY      ENT SURGERY      IR [facility] VENOUS THROMB  3/21/2024    IR PULMONARY ANGIOGRAM BILATERAL  3/20/2024       Social History     Tobacco Use    Smoking status: Never    Smokeless tobacco: Never   Substance Use Topics    Alcohol use: No     Family History   Problem Relation Age of Onset    Obesity Mother     Thyroid Disease Mother     Diabetes Mother     Hypertension Mother     Thyroid Disease Father     Diabetes Father     Hypertension Father     Obesity Father     Asthma Father     Cerebrovascular Disease Father     Other Cancer Father     Breast Cancer Paternal Grandmother     Obesity Sister     Thyroid Disease Sister     Cerebrovascular Disease Sister     Other Cancer Sister     Asthma Sister     Melanoma No family hx of     Skin Cancer No family hx of          Current Outpatient Medications   Medication Sig Dispense Refill    apixaban ANTICOAGULANT (ELIQUIS) 5 MG tablet Take 1 tablet (5 mg) by mouth 2 times daily. 180 tablet 3    cetirizine (ZYRTEC) 5 MG tablet Take 5-10 mg by mouth daily as needed for allergies      estradiol (ESTRACE) 0.1 MG/GM vaginal cream       estradiol (VIVELLE-DOT) 0.0375 MG/24HR BIW patch       losartan (COZAAR) 50 MG tablet Take 1 tablet (50 mg) by mouth daily. 90 tablet 1    progesterone (PROMETRIUM) 200 MG capsule        Allergies   Allergen Reactions    Lisinopril Cough    Tea Tree Oil Rash     Recent Labs   Lab Test 07/31/25  0926 07/15/25  1320 04/10/25  1156 07/09/24  0950 03/22/24  0428 03/21/24  0634 03/20/24  1918 03/20/24  1847  "07/11/23  0916 02/08/22  0951 06/05/20  0918 07/26/19  0943   A1C 5.7*  --   --  6.0*  --   --   --  6.1*  --    < > 6.1* 6.5*   *  --   --  89  --   --   --   --  89   < > 88 102*   HDL 43*  --   --  39*  --   --   --   --  40*   < > 36* 42*   TRIG 73  --   --  71  --   --   --   --  109   < > 86 88   ALT  --  15  --   --   --  30  --   --  16   < >  --  24   CR  --  0.58 0.63  --    < > 0.58   < > 0.70 0.56   < > 0.55 0.62   GFRESTIMATED  --  >90 >90  --    < > >90   < > >90 >90   < > >90 >90   GFRESTBLACK  --   --   --   --   --   --   --   --   --   --  >90 >90   POTASSIUM  --  4.5 4.4  --    < > 4.0  --  4.0 4.3   < > 3.7 3.5   TSH 1.29  --   --   --   --   --   --  1.91 1.37   < >  --  0.90    < > = values in this interval not displayed.          Review of Systems  Constitutional, HEENT, cardiovascular, pulmonary, GI, , musculoskeletal, neuro, skin, endocrine and psych systems are negative, except as otherwise noted.     Objective    PHYSICAL EXAM  VITAL SIGNS:   BP (!) 136/100   Pulse 70   Temp 98.2  F (36.8  C) (Tympanic)   Resp 18   Ht 1.713 m (5' 7.44\")   Wt 127.5 kg (281 lb)   SpO2 98%   BMI 43.44 kg/m    Constitutional: Well developed, Well nourished, No acute distress, Non-toxic appearance.    HENT: Normocephalic, Atraumatic, Bilateral external ears normal, Oropharynx moist, No oral exudates, Nose normal.    Eyes: PERRLA, EOMI, Conjunctiva normal, No discharge.    Neck: Normal range of motion, No tenderness, Supple, No stridor.    Lymphatic: No lymphadenopathy noted.    Cardiovascular: Regular rate and rhythm,  No murmurs, No rubs, No gallops.2+ non pitting edema    Thorax & Lungs: Normal breath sounds, No respiratory distress, No wheezing, No chest tenderness.    Abdomen: Bowel sounds normal, Soft, No tenderness, No masses, No pulsatile masses.    Skin: Warm, Dry, No erythema, No rash.    Back: No tenderness, No CVA tenderness.   Extremities: Intact distal pulses, 2+ non pitting edema, " No tenderness, No cyanosis, No clubbing.    Musculoskeletal: Good range of motion in all major joints. No tenderness to palpation or major deformities noted.    Neurologic: Alert & oriented x 3, Normal motor function, Normal sensory function, No focal deficits noted.    Psychiatric: Affect normal, Judgment normal, Mood normal.       Signed Electronically by: Luciana Franco MD

## 2025-07-31 NOTE — PATIENT INSTRUCTIONS
"As discussed, please do fasting labs placed - fasting LAB only appt.     Refills will be taken care     Given your uncontrolled blood pressure, increase the dose of your losartan to 50 mg daily.  Placed referral to sleep study for making sure this is well-controlled.    Placed Lyme's titers as requested.      Follow-up with neurology, heme oncology on recommendations for conditions as managed by them.    ======================      Patient Education   Preventive Care Advice   This is general advice we often give to help people stay healthy. Your care team may have specific advice just for you. Please talk to your care team about your own preventive care needs.  Lifestyle  Exercise at least 150 minutes each week (30 minutes a day, 5 days a week).  Do muscle strengthening activities 2 days a week. These help control your weight and prevent disease.  No smoking.  Wear sunscreen to prevent skin cancer.  Take time with family and friends.  Have your home tested for radon every 2 to 5 years. Radon is a colorless, odorless gas that can harm your lungs. To learn more, go to www.health.state.[place]. and search for \"Radon in Homes.\"  Keep guns unloaded and locked up in a safe place like a safe or gun vault, or, use a gun lock and hide the keys. Always lock away bullets separately. To learn more, visit [employer].[place].gov and search for \"safe gun storage.\"  Nutrition  Eat 5 or more servings of fruits and vegetables each day.  Try wheat bread, brown rice and whole grain pasta (instead of white bread, rice, and pasta).  Get enough calcium and vitamin D. Check the label on foods and aim for 100% of the RDA (recommended daily allowance).  Regular exams  Have a dental exam and cleaning every 6 months.  Older adults: Ask your care team how often to have memory testing.  See your health care team every year to talk about:  Any changes in your health.  Any medicines your care team has prescribed.  Preventive care, family planning, and ways to " prevent chronic diseases.  Shots (vaccines)   HPV shots (up to age 26), if you've never had them before.  Hepatitis B shots (up to age 59), if you've never had them before.  COVID-19 shot: Get this shot when it's due.  Flu shot: Get a flu shot every year.  Tetanus shot: Get a tetanus shot every 10 years.  Pneumococcal, hepatitis A, and RSV shots: Ask your care team if you need these based on your risk.  Shingles shot (for age 50 and up).  General health tests  Diabetes screening:  Starting at age 35, Get screened for diabetes at least every 3 years.  If you are younger than age 35, ask your care team if you should be screened for diabetes.  Cholesterol test: At age 39, start having a cholesterol test every 5 years, or more often if advised.  Bone density scan (DEXA): At age 50, ask your care team if you should have this scan for osteoporosis (brittle bones).  Hepatitis C: Get tested at least once in your life.  Abdominal aortic aneurysm screening: Talk to your doctor about having this screening if you:  Have ever smoked; and  Are biologically male; and  Are between the ages of 65 and 75.  STIs (sexually transmitted infections)  Before age 24: Ask your care team if you should be screened for STIs.  After age 24: Get screened for STIs if you're at risk. You are at risk for STIs (including HIV) if:  You are sexually active with more than one person.  You don't use condoms every time.  You or a partner was diagnosed with a sexually transmitted infection.  If you are at risk for HIV, ask about PrEP medicine to prevent HIV.  Get tested for HIV at least once in your life, whether you are at risk for HIV or not.  Cancer screening tests  Cervical cancer screening: If you have a cervix, begin getting regular cervical cancer screening tests at age 21. Most people who have regular screenings with normal results can stop after age 65. Talk about this with your provider.  Breast cancer scan (mammogram): If you've ever had  breasts, begin having regular mammograms starting at age 40. This is a scan to check for breast cancer.  Colon cancer screening: It is important to start screening for colon cancer at age 45.  Have a colonoscopy test every 10 years (or more often if you're at risk) Or, ask your provider about stool tests like a FIT test every year or Cologuard test every 3 years.  To learn more about your testing options, visit: www.[employer]/898428.pdf.  For help making a decision, visit: kamilah/lr52987.  Prostate cancer screening test: If you have a prostate and are age 55 to 69, ask your provider if you would benefit from a yearly prostate cancer screening test.  Lung cancer screening: If you are a current or former smoker age 50 to 80, ask your care team if ongoing lung cancer screenings are right for you.    For informational purposes only. Not to replace the advice of your health care provider. Copyright   2023 [employer]. All rights reserved. Clinically reviewed by the  [employer] [facility]. [employer] 974596 - REV 6/25.  Preventing Falls: Care Instructions  Injuries and health problems such as trouble walking or poor eyesight can increase your risk of falling. So can some medicines. But there are things you can do to help prevent falls. You can exercise to get stronger. You can also arrange your home to make it safer.    Talk to your doctor about the medicines you take. Ask if any of them increase the risk of falls and whether they can be changed or stopped.   Try to exercise regularly. It can help improve your strength and balance. This can help lower your risk of falling.         Practice fall safety and prevention.   Wear low-heeled shoes that fit well and give your feet good support. Talk to your doctor if you have foot problems that make this hard.  Carry a cellphone or wear a medical alert device that you can use to call for help.  Use stepladders instead of chairs to reach high objects.  "Don't climb if you're at risk for falls. Ask for help, if needed.  Wear the correct eyeglasses, if you need them.        Make your home safer.   Remove rugs, cords, clutter, and furniture from walkways.  Keep your house well lit. Use night-lights in hallways and bathrooms.  Install and use sturdy handrails on stairways.  Wear nonskid footwear, even inside. Don't walk barefoot or in socks without shoes.        Be safe outside.   Use handrails, curb cuts, and ramps whenever possible.  Keep your hands free by using a shoulder bag or backpack.  Try to walk in well-lit areas. Watch out for uneven ground, changes in pavement, and debris.  Be careful in the winter. Walk on the grass or gravel when sidewalks are slippery. Use de-icer on steps and walkways. Add non-slip devices to shoes.    Put grab bars and nonskid mats in your shower or tub and near the toilet. Try to use a shower chair or bath bench when bathing.   Get into a tub or shower by putting in your weaker leg first. Get out with your strong side first. Have a phone or medical alert device in the bathroom with you.   Where can you learn more?  Go to https://www.[employer].net/patiented  Enter G117 in the search box to learn more about \"Preventing Falls: Care Instructions.\"  Current as of: July 31, 2024  Content Version: 14.5    9375-5034 [employer].   Care instructions adapted under license by your healthcare professional. If you have questions about a medical condition or this instruction, always ask your healthcare professional. [employer] disclaims any warranty or liability for your use of this information.       "

## 2025-08-04 ENCOUNTER — PATIENT OUTREACH (OUTPATIENT)
Dept: CARE COORDINATION | Facility: CLINIC | Age: 50
End: 2025-08-04
Payer: COMMERCIAL

## 2025-08-30 ENCOUNTER — OFFICE VISIT (OUTPATIENT)
Dept: URGENT CARE | Facility: URGENT CARE | Age: 50
End: 2025-08-30
Payer: COMMERCIAL

## 2025-08-30 VITALS
SYSTOLIC BLOOD PRESSURE: 132 MMHG | RESPIRATION RATE: 20 BRPM | TEMPERATURE: 96.3 F | HEART RATE: 78 BPM | OXYGEN SATURATION: 97 % | DIASTOLIC BLOOD PRESSURE: 79 MMHG | BODY MASS INDEX: 43.18 KG/M2 | WEIGHT: 279.31 LBS

## 2025-08-30 DIAGNOSIS — L03.115 CELLULITIS OF RIGHT KNEE: Primary | ICD-10-CM

## 2025-08-30 PROCEDURE — 3075F SYST BP GE 130 - 139MM HG: CPT | Performed by: FAMILY MEDICINE

## 2025-08-30 PROCEDURE — 3078F DIAST BP <80 MM HG: CPT | Performed by: FAMILY MEDICINE

## 2025-08-30 PROCEDURE — 99214 OFFICE O/P EST MOD 30 MIN: CPT | Performed by: FAMILY MEDICINE

## 2025-08-30 RX ORDER — CEPHALEXIN 500 MG/1
500 CAPSULE ORAL 4 TIMES DAILY
Qty: 28 CAPSULE | Refills: 0 | Status: SHIPPED | OUTPATIENT
Start: 2025-08-30

## 2025-08-30 RX ORDER — PREDNISONE 20 MG/1
40 TABLET ORAL DAILY
Qty: 10 TABLET | Refills: 0 | Status: SHIPPED | OUTPATIENT
Start: 2025-08-30 | End: 2025-09-04

## (undated) RX ORDER — LIDOCAINE HYDROCHLORIDE 10 MG/ML
INJECTION, SOLUTION INFILTRATION; PERINEURAL
Status: DISPENSED
Start: 2024-03-21

## (undated) RX ORDER — HEPARIN SODIUM 1000 [USP'U]/ML
INJECTION, SOLUTION INTRAVENOUS; SUBCUTANEOUS
Status: DISPENSED
Start: 2024-03-20

## (undated) RX ORDER — FENTANYL CITRATE 50 UG/ML
INJECTION, SOLUTION INTRAMUSCULAR; INTRAVENOUS
Status: DISPENSED
Start: 2024-03-20

## (undated) RX ORDER — HEPARIN SODIUM 1000 [USP'U]/ML
INJECTION, SOLUTION INTRAVENOUS; SUBCUTANEOUS
Status: DISPENSED
Start: 2024-03-21

## (undated) RX ORDER — LIDOCAINE HYDROCHLORIDE 10 MG/ML
INJECTION, SOLUTION INFILTRATION; PERINEURAL
Status: DISPENSED
Start: 2024-03-20

## (undated) RX ORDER — FENTANYL CITRATE 50 UG/ML
INJECTION, SOLUTION INTRAMUSCULAR; INTRAVENOUS
Status: DISPENSED
Start: 2024-03-21